# Patient Record
Sex: FEMALE | Race: WHITE | Employment: FULL TIME | URBAN - METROPOLITAN AREA
[De-identification: names, ages, dates, MRNs, and addresses within clinical notes are randomized per-mention and may not be internally consistent; named-entity substitution may affect disease eponyms.]

---

## 2017-01-17 ENCOUNTER — OFFICE VISIT (OUTPATIENT)
Dept: ENDOCRINOLOGY | Age: 67
End: 2017-01-17

## 2017-01-17 VITALS
SYSTOLIC BLOOD PRESSURE: 134 MMHG | DIASTOLIC BLOOD PRESSURE: 72 MMHG | WEIGHT: 212 LBS | HEIGHT: 64 IN | RESPIRATION RATE: 16 BRPM | BODY MASS INDEX: 36.19 KG/M2 | HEART RATE: 72 BPM | OXYGEN SATURATION: 94 % | TEMPERATURE: 98.5 F

## 2017-01-17 DIAGNOSIS — E83.52 HYPERCALCEMIA: Primary | ICD-10-CM

## 2017-01-17 DIAGNOSIS — E55.9 VITAMIN D DEFICIENCY: ICD-10-CM

## 2017-01-17 RX ORDER — GLUCOSAMINE SULFATE 1500 MG
2000 POWDER IN PACKET (EA) ORAL DAILY
COMMUNITY

## 2017-01-17 RX ORDER — GARLIC 1000 MG
2000 CAPSULE ORAL
COMMUNITY
End: 2017-02-23 | Stop reason: ALTCHOICE

## 2017-01-17 RX ORDER — BISMUTH SUBSALICYLATE 262 MG
1 TABLET,CHEWABLE ORAL DAILY
COMMUNITY
End: 2017-02-23 | Stop reason: ALTCHOICE

## 2017-01-17 RX ORDER — AMPICILLIN TRIHYDRATE 250 MG
600 CAPSULE ORAL
COMMUNITY
End: 2017-02-23 | Stop reason: ALTCHOICE

## 2017-01-17 RX ORDER — MULTIVITAMIN
1000 TABLET ORAL
COMMUNITY
End: 2017-02-23 | Stop reason: ALTCHOICE

## 2017-01-17 RX ORDER — ASCORBIC ACID 500 MG
TABLET ORAL
COMMUNITY
End: 2017-02-23 | Stop reason: ALTCHOICE

## 2017-01-17 RX ORDER — ASPIRIN 81 MG/1
TABLET ORAL DAILY
COMMUNITY
End: 2022-08-15

## 2017-01-17 RX ORDER — ACETAMINOPHEN 160 MG/5ML
SUSPENSION, ORAL (FINAL DOSE FORM) ORAL DAILY
COMMUNITY
End: 2017-02-23 | Stop reason: ALTCHOICE

## 2017-01-17 NOTE — PROGRESS NOTES
[facility] ENDOCRINOLOGY               Dereck Jackson MD        [address] 78 [phone] Fax [phone]             Patient Information  Date:1/17/2017  Name : Sharron Chapman 73 y.o.     YOB: 1950         Referred by: Hernando Walls MD       Chief Complaint   Patient presents with    Other     elevated calcium       History of present illness:    Sharron Chapman is a 73 y.o. female here for evaluation of hypercalcemia. She was referred here for evaluation of hypercalcemia which was noted in August 2016, when she was managed by [facility].  She was seen by Dr. Marsha Henry after that, calcium was 10.8 and now more than 11, PTH was low. She was on calcium, four tablets in the past, which she has reduced to only two tablets the last one year, no excess Tums. She is taking vitamin D. She has a history of leukemia, status post bone marrow transplantation in 1998, had premature menopause and was started on Fosamax. She is not sure why it was started. The last bone density in 2016 showed normal BMD.    No excess weight loss, bone pain, no history of sarcoidosis. She is on hydrochlorothiazide for blood pressure control and has been taking for several years. Over the last one year, the dose was increased.           No h/o excess calcium or vitamin D,vitamin A intake  No nausea,abdominal pain  No polyuria,polydipsia, nocturia  No lithium,theophylline usage  No h/o kidney stones,PUD  No h/o bone pain  No h/o osteoporosis,fragility fractures  DXA scan - 2016     BP Readings from Last 3 Encounters:   01/17/17 134/72   11/16/16 129/82   10/18/16 132/81       Wt Readings from Last 3 Encounters:   01/17/17 212 lb (96.2 kg)   11/16/16 209 lb (94.8 kg)   10/18/16 211 lb (95.7 kg)       Past Medical History   Diagnosis Date    Hypertension     Leukemia ([facility] [address]        Current Outpatient Prescriptions   Medication Sig    DOCOSAHEXANOIC ACID/EPA (FISH OIL PO) Take 1,200 mg by mouth daily. Twice daily    aspirin delayed-release 81 mg tablet Take  by mouth daily.  ascorbic acid, vitamin C, (VITAMIN C) 500 mg tablet Take  by mouth. W/ whitley hips    cholecalciferol (VITAMIN D3) 1,000 unit cap Take 2,000 Units by mouth daily.  garlic 5,291 mg cap Take 2,000 Units by mouth.  multivitamin (ONE A DAY) tablet Take 1 Tab by mouth daily. multimineral    FIBER CHOICE PO Take  by mouth. gummies    POMEGRANATE XT/POMEGRANAT SEED (POMEGRANATE PO) Take  by mouth. W/ apple cider vinegar    biotin 2,500 mcg tab Take 5,000 mcg by mouth.  cinnamon bark (CINNAMON) 500 mg cap Take 1,000 mg by mouth.  coenzyme Q-10 (CO Q-10) 200 mg capsule Take  by mouth daily.  red yeast rice extract 600 mg cap Take 600 mg by mouth now.  fenofibrate micronized (LOFIBRA) 134 mg capsule Take 1 Cap by mouth every morning.  atorvastatin (LIPITOR) 40 mg tablet Take 1 Tab by mouth daily.  bisoprolol-hydrochlorothiazide (ZIAC) 5-6.25 mg per tablet Take 1 Tab by mouth daily.  alendronate (FOSAMAX) 70 mg tablet Take  by mouth. No current facility-administered medications for this visit. Allergies   Allergen Reactions    Codeine Nausea and Vomiting    Erythromycin Nausea and Vomiting         Review of Systems:  - Constitutional Symptoms: no fevers, chills, weight loss  - Eyes: no blurry vision or double vision  - Cardiovascular: no chest pain or palpitations  - Respiratory: no cough or shortness of breath  - Gastrointestinal: no dysphagia   - Musculoskeletal: no joint pains or weakness  - Integumentary: no rashes  - Neurological: no numbness, tingling, or headaches  - Psychiatric: no depression or anxiety  - Endocrine: no heat or cold intolerance    Physical Examination:  Blood pressure 134/72, pulse 72, temperature 98.5 °F (36.9 °C), temperature source Oral, resp. rate 16, height 5' 4\" (1.626 m), weight 212 lb (96.2 kg), SpO2 94 %.   - General: pleasant, no distress, good eye contact  - HEENT: no pallor,EOMI  - Neck: supple, no thyromegaly,no lymphadenopathy. - Cardiovascular: regular, normal rate, normal S1 and S2, no murmurs  - Respiratory: clear to auscultation bilaterally  - Gastrointestinal: soft, nontender,   - Musculoskeletal: no proximal muscle weakness in upper or lower extremities  - Integumentary: no edema,  - Neurological: alert and oriented,no focal neurological deficits  - Psychiatric: normal mood and affect    Data Reviewed:     Lab Results   Component Value Date/Time    Calcium 11.1 11/16/2016 07:41 AM     No results found for: Saima David, XQVID3, XQVID, VD3RIA    No results found for: TSH, TSH2, TSH3, TSHP, TSHEXT, TSHEXT  Lab Results   Component Value Date/Time    Sodium 141 11/16/2016 07:41 AM    Potassium 4.7 11/16/2016 07:41 AM    Chloride 100 11/16/2016 07:41 AM    CO2 23 11/16/2016 07:41 AM    Glucose 97 11/16/2016 07:41 AM    BUN 21 11/16/2016 07:41 AM    Creatinine 0.93 11/16/2016 07:41 AM    BUN/Creatinine ratio 23 11/16/2016 07:41 AM    GFR est AA 75 11/16/2016 07:41 AM    GFR est non-AA 65 11/16/2016 07:41 AM    Calcium 11.1 11/16/2016 07:41 AM       [] Reviewed labs    Assessment/Plan:   Hypercalcemia  She has Non -PTH related hypercalcemia, denies any excess calcium intake. With hydrochlorothiazide induced hypercalcemia, the PTH should be within the normal range or slightly elevated. Need to repeat PTH. Sometimes, this could be HAMA interference and cause falsely low PTH. Biotin can falsely lower PTH in rare instances     She is on several supplements and I asked her to hold all supplements for three weeks, repeat labs and also check PTH- related peptide, serum protein electrophoresis, vitamin D-125. She has a history of leukemia but no systemic symptoms. There are no Patient Instructions on file for this visit.   Follow-up Disposition: Not on File      Thank you for allowing me to participate in the care of this patient.     Latrice Myers MD        Patient verbalized understanding

## 2017-01-17 NOTE — PROGRESS NOTES
Wt Readings from Last 3 Encounters:   01/17/17 212 lb (96.2 kg)   11/16/16 209 lb (94.8 kg)   10/18/16 211 lb (95.7 kg)     Temp Readings from Last 3 Encounters:   01/17/17 98.5 °F (36.9 °C) (Oral)   11/16/16 98.3 °F (36.8 °C) (Oral)   10/18/16 98.1 °F (36.7 °C) (Oral)     BP Readings from Last 3 Encounters:   01/17/17 134/72   11/16/16 129/82   10/18/16 132/81     Pulse Readings from Last 3 Encounters:   01/17/17 72   11/16/16 75   10/18/16 72

## 2017-01-17 NOTE — MR AVS SNAPSHOT
Visit Information Date & Time Provider Department Dept. Phone Encounter #  
 1/17/2017  2:00 PM Cezar Dalton MD [facility] [phone] [id number] Upcoming Health Maintenance Date Due Hepatitis C Screening 1950 DTaP/Tdap/Td series (1 - Tdap) 8/29/1972 FOBT Q 1 YEAR AGE 50-75 8/29/2001 ZOSTER VACCINE AGE 60> 8/29/2011 GLAUCOMA SCREENING Q2Y 8/29/2016 Pneumococcal 65+ High/Highest Risk (1 of 2 - PCV13) 8/29/2016 MEDICARE YEARLY EXAM 8/29/2016 BREAST CANCER SCRN MAMMOGRAM 11/29/2018 Allergies as of 1/17/2017  Review Complete On: 1/17/2017 By: Cezar Dalton MD  
  
 Severity Noted Reaction Type Reactions Codeine  10/18/2016    Nausea and Vomiting Erythromycin  10/18/2016    Nausea and Vomiting Current Immunizations  Reviewed on 10/18/2016 Name Date Influenza High Dose Vaccine PF 10/18/2016 Not reviewed this visit You Were Diagnosed With   
  
 Codes Comments Hypercalcemia    -  Primary ICD-10-CM: W16.62 
ICD-9-CM: 275.42 Vitals BP Pulse Temp Resp Height(growth percentile) Weight(growth percentile) 134/72 (BP 1 [facility]: Left arm, BP Patient Position: Sitting) 72 98.5 °F (36.9 °C) (Oral) 16 5' 4\" (1.626 m) 212 lb (96.2 kg) SpO2 BMI OB Status Smoking Status 94% 36.39 kg/m2 Postmenopausal Former Smoker Vitals History BMI and BSA Data Body Mass Index Body Surface Area  
 36.39 kg/m 2 2.08 m 2 Preferred Pharmacy Pharmacy Name Phone [facility] [address], [address] [phone] Your Updated Medication List  
  
   
This list is accurate as of: 1/17/17  2:55 PM.  Always use your most recent med list.  
  
  
  
  
 alendronate 70 mg tablet Commonly known as:  FOSAMAX Take  by mouth. aspirin delayed-release 81 mg tablet Take  by mouth daily. atorvastatin 40 mg tablet Commonly known as:  LIPITOR Take 1 Tab by mouth daily. biotin 2,500 mcg Tab Take 5,000 mcg by mouth. bisoprolol-hydroCHLOROthiazide 5-6.25 mg per tablet Commonly known as:  [facility] Take 1 Tab by mouth daily. CINNAMON 500 mg Cap Generic drug:  cinnamon bark Take 1,000 mg by mouth. CO Q-10 200 mg capsule Generic drug:  coenzyme Q-10 Take  by mouth daily. fenofibrate micronized 134 mg capsule Commonly known as:  LOFIBRA Take 1 Cap by mouth every morning. FIBER CHOICE PO Take  by mouth. gummies FISH OIL PO Take 1,200 mg by mouth daily. Twice daily  
  
 garlic 2,351 mg Cap Take 2,000 Units by mouth.  
  
 multivitamin tablet Commonly known as:  ONE A DAY Take 1 Tab by mouth daily. multimineral  
  
 POMEGRANATE PO Take  by mouth. W/ apple cider vinegar  
  
 red yeast rice extract 600 mg Cap Take 600 mg by mouth now. VITAMIN C 500 mg tablet Generic drug:  ascorbic acid (vitamin C) Take  by mouth. W/ whitley hips VITAMIN D3 1,000 unit Cap Generic drug:  cholecalciferol Take 2,000 Units by mouth daily. Introducing [facility] & HEALTH SERVICES! Dear Solomon Mathur: 
Thank you for requesting a GlySure account. Our records indicate that you already have an active GlySure account. You can access your account anytime at [email or web address] Did you know that you can access your hospital and ER discharge instructions at any time in GlySure? You can also review all of your test results from your hospital stay or ER visit. Additional Information If you have questions, please visit the Frequently Asked Questions section of the GlySure website at [email or web address] Remember, GlySure is NOT to be used for urgent needs. For medical emergencies, dial 911. Now available from your iPhone and Android! Please provide this summary of care documentation to your next provider. Your primary care clinician is listed as Latasha Paul. If you have any questions after today's visit, please call [phone].

## 2017-01-18 ENCOUNTER — TELEPHONE (OUTPATIENT)
Dept: ENDOCRINOLOGY | Age: 67
End: 2017-01-18

## 2017-01-18 DIAGNOSIS — R79.89 LOW SERUM PARATHYROID HORMONE (PTH): ICD-10-CM

## 2017-01-18 DIAGNOSIS — E83.52 HYPERCALCEMIA: Primary | ICD-10-CM

## 2017-01-18 NOTE — TELEPHONE ENCOUNTER
Pt returned call. Pt stated Dr. Cesario Godinez told her to stop taking her supplements for her next labs. Pt wanted to know if that included ASA. Informed pt that per Dr. Cesario Godinez she can continue taking ASA. Pt verbalized understanding with no further questions or concerns at this time.

## 2017-01-18 NOTE — TELEPHONE ENCOUNTER
Attempted to call. Unsuccessful. Left [facility] for Perri Lou to give us a call back at the office. A callback number was left.

## 2017-02-07 ENCOUNTER — TELEPHONE (OUTPATIENT)
Dept: ENDOCRINOLOGY | Age: 67
End: 2017-02-07

## 2017-02-07 DIAGNOSIS — E55.9 VITAMIN D DEFICIENCY: ICD-10-CM

## 2017-02-07 DIAGNOSIS — E83.52 HYPERCALCEMIA: Primary | ICD-10-CM

## 2017-02-12 LAB
1,25(OH)2D3 SERPL-MCNC: 50.9 PG/ML (ref 19.9–79.3)
25(OH)D3+25(OH)D2 SERPL-MCNC: 38.2 NG/ML (ref 30–100)
ALBUMIN SERPL ELPH-MCNC: 3.8 G/DL (ref 2.9–4.4)
ALBUMIN SERPL-MCNC: 4.3 G/DL (ref 3.6–4.8)
ALBUMIN/GLOB SERPL: 1.2 {RATIO} (ref 0.7–1.7)
ALBUMIN/GLOB SERPL: 1.6 {RATIO} (ref 1.1–2.5)
ALP SERPL-CCNC: 72 IU/L (ref 39–117)
ALPHA1 GLOB SERPL ELPH-MCNC: 0.3 G/DL (ref 0–0.4)
ALPHA2 GLOB SERPL ELPH-MCNC: 0.7 G/DL (ref 0.4–1)
ALT SERPL-CCNC: 27 IU/L (ref 0–32)
AST SERPL-CCNC: 25 IU/L (ref 0–40)
B-GLOBULIN SERPL ELPH-MCNC: 1.1 G/DL (ref 0.7–1.3)
BILIRUB SERPL-MCNC: 0.4 MG/DL (ref 0–1.2)
BUN SERPL-MCNC: 20 MG/DL (ref 8–27)
BUN/CREAT SERPL: 19 (ref 11–26)
CALCIUM SERPL-MCNC: 10.1 MG/DL (ref 8.7–10.3)
CHLORIDE SERPL-SCNC: 99 MMOL/L (ref 96–106)
CO2 SERPL-SCNC: 28 MMOL/L (ref 18–29)
CREAT SERPL-MCNC: 1.05 MG/DL (ref 0.57–1)
GAMMA GLOB SERPL ELPH-MCNC: 1.2 G/DL (ref 0.4–1.8)
GLOBULIN SER CALC-MCNC: 2.7 G/DL (ref 1.5–4.5)
GLOBULIN SER CALC-MCNC: 3.2 G/DL (ref 2.2–3.9)
GLUCOSE SERPL-MCNC: 95 MG/DL (ref 65–99)
INTERPRETATION: NORMAL
M PROTEIN SERPL ELPH-MCNC: NORMAL G/DL
MAGNESIUM SERPL-MCNC: 1.8 MG/DL (ref 1.6–2.3)
PLEASE NOTE, 011150: NORMAL
POTASSIUM SERPL-SCNC: 4.6 MMOL/L (ref 3.5–5.2)
PROT SERPL-MCNC: 7 G/DL (ref 6–8.5)
PTH RELATED PROT SERPL-SCNC: <1.1 PMOL/L
PTH-INTACT SERPL-MCNC: 25 PG/ML (ref 15–65)
SODIUM SERPL-SCNC: 141 MMOL/L (ref 134–144)
TSH SERPL DL<=0.005 MIU/L-ACNC: 2.1 UIU/ML (ref 0.45–4.5)

## 2017-02-20 ENCOUNTER — TELEPHONE (OUTPATIENT)
Dept: ENDOCRINOLOGY | Age: 67
End: 2017-02-20

## 2017-02-20 NOTE — TELEPHONE ENCOUNTER
Pt states she would like a call from Dr Misty Hinson regarding her calcium. She states she was told to add 1 calcium tab but pt is concerned because she has elevated calcium already. She wants to make sure she is taking the right medications.

## 2017-02-20 NOTE — TELEPHONE ENCOUNTER
Patient needs to verify what medications she should be taking. Patient has questions about medications also.

## 2017-02-20 NOTE — TELEPHONE ENCOUNTER
Based on the blood tests the elevated calcium she had  is not because of calcium tablets she is taking     Calcium is given to protect the bones and 1 a day is protective in postmenopausal women       In her case high calcium is due to either HCTZ ( BP med), other supplements she was taking -   Only when patients take 4 - 5 calcium tabs then they overdose and then calcium goes up

## 2017-02-22 ENCOUNTER — PATIENT OUTREACH (OUTPATIENT)
Dept: FAMILY MEDICINE CLINIC | Age: 67
End: 2017-02-22

## 2017-02-22 NOTE — TELEPHONE ENCOUNTER
Informed pt calcium tablet information per Dr Edith Joe. Pt asked that physician correct her medication list as she has been taken off of some of the meds.

## 2017-02-23 NOTE — PROGRESS NOTES
[address]  [phone]  Fax [phone]    Patient Name: Aby Hall  YOB: 1950    Ambulatory Nurse Navigator contacting patient to schedule [facility] 2017 Annual Health Assessment for patients enrolled with [facility] [address]. Patient states that she is not due yet for annual wellness this year. Deferring to April time frame when she will make an appt with Dr Humberto rueda. Patient would also like to discuss: Medications endocrinologist prescribed in their office. Reviewed what [facility] has on med recon. Patient states there were changes; encouraged contacting their office to verify medications Endocrine would like her on. Patient confirmed.

## 2017-03-03 ENCOUNTER — TELEPHONE (OUTPATIENT)
Dept: FAMILY MEDICINE CLINIC | Age: 67
End: 2017-03-03

## 2017-03-03 NOTE — TELEPHONE ENCOUNTER
Pt states she went to Urgent Care on 2/27/17 and was diagnosed with a ganglion cyst to the left wrist. She would like to know how to go about getting it drained.  Does she need a referral to another Dr. Almazan Cobre [facility] # [phone]

## 2017-05-23 ENCOUNTER — DOCUMENTATION ONLY (OUTPATIENT)
Dept: FAMILY MEDICINE CLINIC | Age: 67
End: 2017-05-23

## 2017-05-23 NOTE — PROGRESS NOTES
Records from [facility] signed by PCP and was sent to central scanning to be scanned into chart.

## 2017-05-31 ENCOUNTER — OFFICE VISIT (OUTPATIENT)
Dept: FAMILY MEDICINE CLINIC | Age: 67
End: 2017-05-31

## 2017-05-31 VITALS
DIASTOLIC BLOOD PRESSURE: 83 MMHG | RESPIRATION RATE: 18 BRPM | SYSTOLIC BLOOD PRESSURE: 131 MMHG | HEART RATE: 75 BPM | WEIGHT: 208 LBS | OXYGEN SATURATION: 98 % | HEIGHT: 64 IN | BODY MASS INDEX: 35.51 KG/M2 | TEMPERATURE: 98.5 F

## 2017-05-31 DIAGNOSIS — Z23 ENCOUNTER FOR IMMUNIZATION: ICD-10-CM

## 2017-05-31 DIAGNOSIS — Z01.419 WELL WOMAN EXAM WITH ROUTINE GYNECOLOGICAL EXAM: Primary | ICD-10-CM

## 2017-05-31 DIAGNOSIS — Z12.11 SCREEN FOR COLON CANCER: ICD-10-CM

## 2017-05-31 DIAGNOSIS — E83.52 HYPERCALCEMIA: ICD-10-CM

## 2017-05-31 DIAGNOSIS — E55.9 VITAMIN D DEFICIENCY: ICD-10-CM

## 2017-05-31 DIAGNOSIS — N30.90 BLADDER INFECTION: ICD-10-CM

## 2017-05-31 LAB
BILIRUB UR QL STRIP: NEGATIVE
GLUCOSE UR-MCNC: NEGATIVE MG/DL
KETONES P FAST UR STRIP-MCNC: NEGATIVE MG/DL
PH UR STRIP: 6.5 [PH] (ref 4.6–8)
PROT UR QL STRIP: NEGATIVE MG/DL
SP GR UR STRIP: 1.01 (ref 1–1.03)
UA UROBILINOGEN AMB POC: NORMAL (ref 0.2–1)
URINALYSIS CLARITY POC: CLEAR
URINALYSIS COLOR POC: YELLOW
URINE BLOOD POC: NEGATIVE
URINE LEUKOCYTES POC: NORMAL
URINE NITRITES POC: NEGATIVE

## 2017-05-31 NOTE — PROGRESS NOTES
Pt here for annual physical w/ fasting lab work. Reports that she recently completed abx due to bladder infection.

## 2017-05-31 NOTE — PROGRESS NOTES
Pt here for annual physical w/ fasting lab work. Reports that she recently completed abx due to bladder infection. 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 female for Well Woman Check. She is postmenopausal.  Pertinent past medical hstory:   Past Medical History:   Diagnosis Date    Hypertension     Leukemia [facility])        Patient Active Problem List   Diagnosis Code    Hypercalcemia E83.52    Vitamin D deficiency E55.9     Current Outpatient Prescriptions   Medication Sig Dispense Refill    DOCOSAHEXANOIC ACID/EPA (FISH OIL PO) Take 1,200 mg by mouth daily. Twice daily      aspirin delayed-release 81 mg tablet Take  by mouth daily.  cholecalciferol (VITAMIN D3) 1,000 unit cap Take 2,000 Units by mouth daily.  fenofibrate micronized (LOFIBRA) 134 mg capsule Take 1 Cap by mouth every morning. 90 Cap 3    atorvastatin (LIPITOR) 40 mg tablet Take 1 Tab by mouth daily. 90 Tab 3    bisoprolol-hydrochlorothiazide (ZIAC) 5-6.25 mg per tablet Take 1 Tab by mouth daily. 90 Tab 3     Allergies   Allergen Reactions    Codeine Nausea and Vomiting    Erythromycin Nausea and Vomiting        ROS:  Feeling well. No dyspnea or chest pain on exertion. No abdominal pain, change in bowel habits, black or bloody stools. No urinary tract symptoms. GYN ROS: no breast pain or new or enlarging lumps on self exam, no vaginal bleeding, no discharge or pelvic pain. Menopausal symptoms: none. No neurological complaints.     Last DEXA scan and T-score: 8/16   Last Colonoscopy: More than 10 years  Last Mammogram: 8/16    Objective:     Visit Vitals    /83 (BP 1 [facility]: Left arm, BP Patient Position: Sitting)    Pulse 75    Temp 98.5 °F (36.9 °C) (Oral)    Resp 18    Ht 5' 4\" (1.626 m)    Wt 208 lb (94.3 kg)    SpO2 98%    BMI 35.7 kg/m2     The patient appears well, alert, oriented x 3, in no distress. ENT normal.  Neck supple. No adenopathy or thyromegaly. FRANCESCA. Lungs are clear, good air entry, no wheezes, rhonchi or rales. S1 and S2 normal, no murmurs, regular rate and rhythm. Abdomen soft without tenderness, guarding, mass or organomegaly. Extremities show no edema, normal peripheral pulses. Neurological is normal, no focal findings. BREAST EXAM: breasts appear normal, no suspicious masses, no skin or nipple changes or axillary nodes    PELVIC EXAM: VULVA: normal appearing vulva with no masses, tenderness or lesions, VAGINA: normal appearing vagina with normal color and discharge, no lesions, CERVIX: normal appearing cervix without discharge or lesions, UTERUS: uterus is normal size, shape, consistency and nontender, ADNEXA: normal adnexa in size, nontender and no masses    Assessment/Plan:   well woman  postmenopausal  pap smear  additional lab tests per orders  return annually or prn    ICD-10-CM ICD-9-CM    1. Well woman exam with routine gynecological exam Z01.419 V72.31 PAP + HPV DNA (HIGH RISK)      CBC WITH AUTOMATED DIFF      LIPID PANEL      TSH 3RD GENERATION      HEMOGLOBIN A1C WITH EAG      HEPATITIS C AB    [V72.31]   2. Bladder infection N30.90 595.9 AMB POC URINALYSIS DIP STICK AUTO W/O MICRO   3. Hypercalcemia P61.19 835.44 METABOLIC PANEL, COMPREHENSIVE   4. Vitamin D deficiency E55.9 268.9 VITAMIN D, 25 HYDROXY   5. Encounter for immunization Z23 V03.89 ADMIN PNEUMOCOCCAL VACCINE      PNEUMOCOCCAL POLYSACCHARIDE VACCINE, 23-VALENT, ADULT OR IMMUNOSUPPRESSED PT DOSE,   6. Screen for colon cancer Z12.11 V76.51 OCCULT BLOOD, IMMUNOASSAY (FIT)     Encounter Diagnoses   Name Primary?     Well woman exam with routine gynecological exam Yes    Comment: [V72.31]    Bladder infection     Hypercalcemia     Vitamin D deficiency     Encounter for immunization     Screen for colon cancer      Orders Placed This Encounter    Pneumococcal Polysaccharide Vaccine    CBC WITH AUTOMATED DIFF    LIPID PANEL    METABOLIC PANEL, COMPREHENSIVE    TSH 3RD GENERATION    VITAMIN D, 25 HYDROXY    HEMOGLOBIN A1C WITH EAG    HEPATITIS C AB    OCCULT BLOOD, IMMUNOASSAY (FIT)    AMB POC URINALYSIS DIP STICK AUTO W/O MICRO    PAP, HPV HIGH RISK   .

## 2017-05-31 NOTE — MR AVS SNAPSHOT
Visit Information Date & Time Provider Department Dept. Phone Encounter #  
 5/31/2017  8:30 AM Guera Weiss MD [address] [facility] [phone] [id number] Upcoming Health Maintenance Date Due Hepatitis C Screening 1950 DTaP/Tdap/Td series (1 - Tdap) 8/29/1972 FOBT Q 1 YEAR AGE 50-75 8/29/2001 Pneumococcal 65+ High/Highest Risk (1 of 2 - PCV13) 8/29/2016 INFLUENZA AGE 9 TO ADULT 8/1/2017 GLAUCOMA SCREENING Q2Y 2/1/2018 MEDICARE YEARLY EXAM 6/1/2018 BREAST CANCER SCRN MAMMOGRAM 11/29/2018 Allergies as of 5/31/2017  Review Complete On: 5/31/2017 By: Guera Weiss MD  
  
 Severity Noted Reaction Type Reactions Codeine  10/18/2016    Nausea and Vomiting Erythromycin  10/18/2016    Nausea and Vomiting Current Immunizations  Reviewed on 10/18/2016 Name Date Influenza High Dose Vaccine PF 10/18/2016 Pneumococcal Polysaccharide (PPSV-23)  Incomplete Zoster Vaccine, Live 1/1/2015 Not reviewed this visit You Were Diagnosed With   
  
 Codes Comments Well woman exam with routine gynecological exam    -  Primary ICD-10-CM: P00.686 ICD-9-CM: V72.31 [V72.31] Bladder infection     ICD-10-CM: N30.90 ICD-9-CM: 595.9 Hypercalcemia     ICD-10-CM: R28.96 
ICD-9-CM: 275.42 Vitamin D deficiency     ICD-10-CM: E55.9 ICD-9-CM: 268.9 Encounter for immunization     ICD-10-CM: O91 ICD-9-CM: V03.89 Screen for colon cancer     ICD-10-CM: Z12.11 ICD-9-CM: V76.51 Vitals BP Pulse Temp Resp Height(growth percentile) Weight(growth percentile) 131/83 (BP 1 [facility]: Left arm, BP Patient Position: Sitting) 75 98.5 °F (36.9 °C) (Oral) 18 5' 4\" (1.626 m) 208 lb (94.3 kg) SpO2 BMI OB Status Smoking Status 98% 35.7 kg/m2 Postmenopausal Former Smoker Vitals History BMI and BSA Data Body Mass Index Body Surface Area 35.7 kg/m 2 2.06 m 2 Preferred Pharmacy Pharmacy Name Phone [facility] Les[facility] FRANCIS [phone] Your Updated Medication List  
  
   
This list is accurate as of: 5/31/17  9:16 AM.  Always use your most recent med list.  
  
  
  
  
 aspirin delayed-release 81 mg tablet Take  by mouth daily. atorvastatin 40 mg tablet Commonly known as:  LIPITOR Take 1 Tab by mouth daily. bisoprolol-hydroCHLOROthiazide 5-6.25 mg per tablet Commonly known as:  [facility] Take 1 Tab by mouth daily. fenofibrate micronized 134 mg capsule Commonly known as:  LOFIBRA Take 1 Cap by mouth every morning. FISH OIL PO Take 1,200 mg by mouth daily. Twice daily VITAMIN D3 1,000 unit Cap Generic drug:  cholecalciferol Take 2,000 Units by mouth daily. We Performed the Following ADMIN PNEUMOCOCCAL VACCINE [ HCPCS] AMB POC URINALYSIS DIP STICK AUTO W/O MICRO [38636 CPT(R)] CBC WITH AUTOMATED DIFF [68567 CPT(R)] HEMOGLOBIN A1C WITH EAG [73635 CPT(R)] HEPATITIS C AB [13574 CPT(R)] LIPID PANEL [64331 CPT(R)] METABOLIC PANEL, COMPREHENSIVE [52812 CPT(R)] OCCULT BLOOD, IMMUNOASSAY (FIT) [id number] CPT(R)] PNEUMOCOCCAL POLYSACCHARIDE VACCINE, 23-VALENT, ADULT OR IMMUNOSUPPRESSED PT DOSE, [52926 CPT(R)] TSH 3RD GENERATION [01090 CPT(R)] VITAMIN D, 25 HYDROXY [id number] CPT(R)] To-Do List   
 05/31/2017 Pathology:  PAP + HPV DNA (HIGH RISK) Introducing [facility] & HEALTH SERVICES! Dear [address] [place] [address]: 
Thank you for requesting a Iotelligent account. Our records indicate that you already have an active Iotelligent account. You can access your account anytime at [email or web address] Did you know that you can access your hospital and ER discharge instructions at any time in Iotelligent? You can also review all of your test results from your hospital stay or ER visit. Additional Information If you have questions, please visit the Frequently Asked Questions section of the Boxeverhart website at [email or web address] Remember, Funji is NOT to be used for urgent needs. For medical emergencies, dial 911. Now available from your iPhone and Android! Please provide this summary of care documentation to your next provider. Your primary care clinician is listed as Latasha Paul. If you have any questions after today's visit, please call [phone].

## 2017-06-01 LAB
25(OH)D3+25(OH)D2 SERPL-MCNC: 38.1 NG/ML (ref 30–100)
ALBUMIN SERPL-MCNC: 4.7 G/DL (ref 3.6–4.8)
ALBUMIN/GLOB SERPL: 1.6 {RATIO} (ref 1.2–2.2)
ALP SERPL-CCNC: 81 IU/L (ref 39–117)
ALT SERPL-CCNC: 25 IU/L (ref 0–32)
AST SERPL-CCNC: 27 IU/L (ref 0–40)
BACTERIA UR CULT: NORMAL
BASOPHILS # BLD AUTO: 0 X10E3/UL (ref 0–0.2)
BASOPHILS NFR BLD AUTO: 0 %
BILIRUB SERPL-MCNC: 0.4 MG/DL (ref 0–1.2)
BUN SERPL-MCNC: 17 MG/DL (ref 8–27)
BUN/CREAT SERPL: 16 (ref 12–28)
CALCIUM SERPL-MCNC: 10.5 MG/DL (ref 8.7–10.3)
CHLORIDE SERPL-SCNC: 101 MMOL/L (ref 96–106)
CHOLEST SERPL-MCNC: 168 MG/DL (ref 100–199)
CO2 SERPL-SCNC: 23 MMOL/L (ref 18–29)
CREAT SERPL-MCNC: 1.06 MG/DL (ref 0.57–1)
EOSINOPHIL # BLD AUTO: 0.1 X10E3/UL (ref 0–0.4)
EOSINOPHIL NFR BLD AUTO: 1 %
ERYTHROCYTE [DISTWIDTH] IN BLOOD BY AUTOMATED COUNT: 15.8 % (ref 12.3–15.4)
EST. AVERAGE GLUCOSE BLD GHB EST-MCNC: 148 MG/DL
GLOBULIN SER CALC-MCNC: 2.9 G/DL (ref 1.5–4.5)
GLUCOSE SERPL-MCNC: 110 MG/DL (ref 65–99)
HBA1C MFR BLD: 6.8 % (ref 4.8–5.6)
HCT VFR BLD AUTO: 45.1 % (ref 34–46.6)
HCV AB S/CO SERPL IA: <0.1 S/CO RATIO (ref 0–0.9)
HDLC SERPL-MCNC: 56 MG/DL
HGB BLD-MCNC: 14.5 G/DL (ref 11.1–15.9)
IMM GRANULOCYTES # BLD: 0 X10E3/UL (ref 0–0.1)
IMM GRANULOCYTES NFR BLD: 0 %
INTERPRETATION, 910389: NORMAL
INTERPRETATION: NORMAL
LDLC SERPL CALC-MCNC: 82 MG/DL (ref 0–99)
LYMPHOCYTES # BLD AUTO: 3.7 X10E3/UL (ref 0.7–3.1)
LYMPHOCYTES NFR BLD AUTO: 41 %
MCH RBC QN AUTO: 28.2 PG (ref 26.6–33)
MCHC RBC AUTO-ENTMCNC: 32.2 G/DL (ref 31.5–35.7)
MCV RBC AUTO: 88 FL (ref 79–97)
MONOCYTES # BLD AUTO: 0.6 X10E3/UL (ref 0.1–0.9)
MONOCYTES NFR BLD AUTO: 7 %
NEUTROPHILS # BLD AUTO: 4.6 X10E3/UL (ref 1.4–7)
NEUTROPHILS NFR BLD AUTO: 51 %
PDF IMAGE, 910387: NORMAL
PLATELET # BLD AUTO: 337 X10E3/UL (ref 150–379)
POTASSIUM SERPL-SCNC: 4.8 MMOL/L (ref 3.5–5.2)
PROT SERPL-MCNC: 7.6 G/DL (ref 6–8.5)
RBC # BLD AUTO: 5.14 X10E6/UL (ref 3.77–5.28)
SODIUM SERPL-SCNC: 142 MMOL/L (ref 134–144)
TRIGL SERPL-MCNC: 149 MG/DL (ref 0–149)
TSH SERPL DL<=0.005 MIU/L-ACNC: 2.22 UIU/ML (ref 0.45–4.5)
VLDLC SERPL CALC-MCNC: 30 MG/DL (ref 5–40)
WBC # BLD AUTO: 9 X10E3/UL (ref 3.4–10.8)

## 2017-06-01 NOTE — PROGRESS NOTES
Blood sugars levels are increased and have reached diabetic levels. I would recommend a starting on a daily medication for blood sugar control. Please advise if you agree. All other labs are within normal limits. A message has been sent in Curetis and the lab work released to the patient.

## 2017-06-07 RX ORDER — INSULIN PUMP SYRINGE, 3 ML
EACH MISCELLANEOUS
Qty: 1 KIT | Refills: 0 | Status: SHIPPED | OUTPATIENT
Start: 2017-06-07 | End: 2017-06-14 | Stop reason: SDUPTHER

## 2017-06-07 RX ORDER — LANCETS
EACH MISCELLANEOUS
Qty: 100 EACH | Refills: 11 | Status: SHIPPED | OUTPATIENT
Start: 2017-06-07 | End: 2017-06-14 | Stop reason: SDUPTHER

## 2017-06-08 ENCOUNTER — PATIENT OUTREACH (OUTPATIENT)
Dept: FAMILY MEDICINE CLINIC | Age: 67
End: 2017-06-08

## 2017-06-08 LAB
CYTOLOGIST CVX/VAG CYTO: NORMAL
DX ICD CODE: NORMAL
HPV I/H RISK 1 DNA CVX QL PROBE+SIG AMP: NEGATIVE
Lab: NORMAL
OTHER STN SPEC: NORMAL
PATH REPORT.FINAL DX SPEC: NORMAL
STAT OF ADQ CVX/VAG CYTO-IMP: NORMAL

## 2017-06-08 NOTE — PROGRESS NOTES
[address]  [phone]  Fax [phone]    Patient Name: Sherin Schilder  YOB: 1950    [facility] 2017 [address] for patients enrolled with [facility] [address]. Patient has come in for Wellness exam last week. F/u for completion.

## 2017-06-09 LAB — HEMOCCULT STL QL IA: NEGATIVE

## 2017-06-14 RX ORDER — LANCETS
EACH MISCELLANEOUS
Qty: 100 EACH | Refills: 11 | Status: SHIPPED | OUTPATIENT
Start: 2017-06-14 | End: 2020-11-06

## 2017-06-14 RX ORDER — INSULIN PUMP SYRINGE, 3 ML
EACH MISCELLANEOUS
Qty: 1 KIT | Refills: 0 | Status: SHIPPED | OUTPATIENT
Start: 2017-06-14 | End: 2022-09-01

## 2017-07-25 ENCOUNTER — OFFICE VISIT (OUTPATIENT)
Dept: FAMILY MEDICINE CLINIC | Age: 67
End: 2017-07-25

## 2017-07-25 VITALS
SYSTOLIC BLOOD PRESSURE: 110 MMHG | HEIGHT: 64 IN | WEIGHT: 199.8 LBS | RESPIRATION RATE: 18 BRPM | OXYGEN SATURATION: 96 % | TEMPERATURE: 99 F | HEART RATE: 74 BPM | DIASTOLIC BLOOD PRESSURE: 74 MMHG | BODY MASS INDEX: 34.11 KG/M2

## 2017-07-25 DIAGNOSIS — N30.00 ACUTE CYSTITIS WITHOUT HEMATURIA: ICD-10-CM

## 2017-07-25 DIAGNOSIS — N30.90 BLADDER INFECTION: Primary | ICD-10-CM

## 2017-07-25 LAB
BILIRUB UR QL STRIP: NEGATIVE
GLUCOSE UR-MCNC: NEGATIVE MG/DL
KETONES P FAST UR STRIP-MCNC: NEGATIVE MG/DL
PH UR STRIP: 7 [PH] (ref 4.6–8)
PROT UR QL STRIP: NEGATIVE MG/DL
SP GR UR STRIP: 1.01 (ref 1–1.03)
UA UROBILINOGEN AMB POC: NORMAL (ref 0.2–1)
URINALYSIS CLARITY POC: CLEAR
URINALYSIS COLOR POC: NORMAL
URINE BLOOD POC: NORMAL
URINE LEUKOCYTES POC: NORMAL
URINE NITRITES POC: POSITIVE

## 2017-07-25 RX ORDER — CIPROFLOXACIN 500 MG/1
500 TABLET ORAL 2 TIMES DAILY
Qty: 14 TAB | Refills: 0 | Status: SHIPPED | OUTPATIENT
Start: 2017-07-25 | End: 2017-07-28 | Stop reason: ALTCHOICE

## 2017-07-25 NOTE — PROGRESS NOTES
Chief Complaint   Patient presents with    Bladder Infection       Patient seen in the office today for c/o of a possible bladder infection. She denies pelvic pain, nausea, vomiting or fevers Patient states she has been taking Azo and her urine has been orange but no odor.

## 2017-07-25 NOTE — MR AVS SNAPSHOT
Visit Information Date & Time Provider Department Dept. Phone Encounter #  
 7/25/2017  4:00 PM Isatu Paul MD [address] [facility] [phone] [id number] Upcoming Health Maintenance Date Due DTaP/Tdap/Td series (1 - Tdap) 8/29/1972 MEDICARE YEARLY EXAM 8/29/2016 INFLUENZA AGE 9 TO ADULT 8/1/2017 GLAUCOMA SCREENING Q2Y 2/1/2018 Pneumococcal 65+ High/Highest Risk (2 of 2 - PCV13) 5/31/2018 FOBT Q 1 YEAR AGE 50-75 5/31/2018 BREAST CANCER SCRN MAMMOGRAM 11/29/2018 Allergies as of 7/25/2017  Review Complete On: 7/25/2017 By: Clara Mcnally MD  
  
 Severity Noted Reaction Type Reactions Codeine  10/18/2016    Nausea and Vomiting Erythromycin  10/18/2016    Nausea and Vomiting Current Immunizations  Reviewed on 5/31/2017 Name Date Influenza High Dose Vaccine PF 10/18/2016 Pneumococcal Polysaccharide (PPSV-23) 5/31/2017 Zoster Vaccine, Live 1/1/2015 Not reviewed this visit You Were Diagnosed With   
  
 Codes Comments Bladder infection    -  Primary ICD-10-CM: N30.90 ICD-9-CM: 595.9 Acute cystitis without hematuria     ICD-10-CM: N30.00 ICD-9-CM: 595.0 Vitals BP Pulse Temp Resp Height(growth percentile) Weight(growth percentile) 110/74 (BP 1 [facility]: Left arm, BP Patient Position: Sitting) 74 99 °F (37.2 °C) (Oral) 18 5' 4\" (1.626 m) 199 lb 12.8 oz (90.6 kg) SpO2 BMI OB Status Smoking Status 96% 34.3 kg/m2 Postmenopausal Former Smoker Vitals History BMI and BSA Data Body Mass Index Body Surface Area  
 34.3 kg/m 2 2.02 m 2 [facility] [address] [place] [phone] Your Updated Medication List  
  
   
This list is accurate as of: 7/25/17  4:29 PM.  Always use your most recent med list.  
  
  
  
  
 aspirin delayed-release 81 mg tablet Take  by mouth daily. atorvastatin 40 mg tablet Commonly known as:  LIPITOR Take 1 Tab by mouth daily. bisoprolol-hydroCHLOROthiazide 5-6.25 mg per tablet Commonly known as:  [facility] Take 1 Tab by mouth daily. Blood-Glucose Meter monitoring kit Please check blood sugar once daily. E11.9 Please provide with a freestyle product. ciprofloxacin HCl 500 mg tablet Commonly known as:  CIPRO Take 1 Tab by mouth two (2) times a day for 7 days. fenofibrate micronized 134 mg capsule Commonly known as:  LOFIBRA Take 1 Cap by mouth every morning. FISH OIL PO Take 1,200 mg by mouth daily. Twice daily  
  
 glucose blood VI test strips strip Commonly known as:  ASCENSIA AUTODISC VI, ONE TOUCH ULTRA TEST VI Please check blood sugar once daily. E11.9 Please provide with a freestyle product. Lancets Misc Please check blood sugar once daily. E11.9 Please provide with a freestyle product. VITAMIN D3 1,000 unit Cap Generic drug:  cholecalciferol Take 2,000 Units by mouth daily. Prescriptions Sent to Pharmacy Refills  
 ciprofloxacin HCl (CIPRO) 500 mg tablet 0 Sig: Take 1 Tab by mouth two (2) times a day for 7 days. Class: Normal  
 Pharmacy: [address] [place] [address] 58, [address] [place] Ph #: [phone] Route: Oral  
  
We Performed the Following AMB POC URINALYSIS DIP STICK AUTO W/O MICRO [01574 CPT(R)] CULTURE, URINE [id number] CPT(R)] Introducing [facility] & HEALTH SERVICES! Dear Obinna Sayres: 
Thank you for requesting a BollingoBlog account. Our records indicate that you already have an active BollingoBlog account. You can access your account anytime at [email or web address] Did you know that you can access your hospital and ER discharge instructions at any time in BollingoBlog? You can also review all of your test results from your hospital stay or ER visit. Additional Information If you have questions, please visit the Frequently Asked Questions section of the MyCarehart website at [email or web address] Remember, picsell is NOT to be used for urgent needs. For medical emergencies, dial 911. Now available from your iPhone and Android! Please provide this summary of care documentation to your next provider. Your primary care clinician is listed as Latasha Paul. If you have any questions after today's visit, please call [phone].

## 2017-07-25 NOTE — PROGRESS NOTES
Chief Complaint   Patient presents with    Bladder Infection   Patient seen in the office today for c/o of a possible bladder infection. She denies pelvic pain, nausea, vomiting or fevers Patient states she has been taking Azo and her urine has been orange but no odor. Pt brought her home blood sugar readings, fasting is under 110, under 130 2 hours after eating. Subjective:     Perri Alonso is a 73 y.o. female who complains of dysuria, frequency for 3 days. Patient denies flank pain, vomiting, fever, unusual vaginal discharge. Patient does not have a history of recurrent UTI. Patient does not have a history of pyelonephritis. Patient Active Problem List   Diagnosis Code    Hypercalcemia E83.52    Vitamin D deficiency E55.9     Allergies   Allergen Reactions    Codeine Nausea and Vomiting    Erythromycin Nausea and Vomiting        Review of Systems  Pertinent items are noted in HPI. Objective:     Visit Vitals    /74 (BP 1 [facility]: Left arm, BP Patient Position: Sitting)    Pulse 74    Temp 99 °F (37.2 °C) (Oral)    Resp 18    Ht 5' 4\" (1.626 m)    Wt 199 lb 12.8 oz (90.6 kg)    SpO2 96%    BMI 34.3 kg/m2     General:  alert, cooperative, no distress   Abdomen: soft, nontender, nondistended, no masses or organomegaly. Back:  CVA tenderness absent   :  defer exam     Laboratory:   Urine dipstick shows positive for nitrates. Micro exam: not done. Assessment/Plan:     Acute cystitis     1. ciprofloxacin  2. Maintain adequate hydration  3. May use OTC pyridium as desired, which will turn urine orange/red color  4. Follow up if symptoms not improving, and prn. ICD-10-CM ICD-9-CM    1. Bladder infection N30.90 595.9 AMB POC URINALYSIS DIP STICK AUTO W/O MICRO   2. Acute cystitis without hematuria N30.00 595.0 ciprofloxacin HCl (CIPRO) 500 mg tablet      CULTURE, URINE     Encounter Diagnoses   Name Primary?     Bladder infection Yes    Acute cystitis without hematuria      Orders Placed This Encounter    CULTURE, URINE    AMB POC URINALYSIS DIP STICK AUTO W/O MICRO    ciprofloxacin HCl (CIPRO) 500 mg tablet   .

## 2017-07-27 LAB
BACTERIA UR CULT: ABNORMAL
BACTERIA UR CULT: ABNORMAL

## 2017-07-28 ENCOUNTER — TELEPHONE (OUTPATIENT)
Dept: FAMILY MEDICINE CLINIC | Age: 67
End: 2017-07-28

## 2017-07-28 RX ORDER — SULFAMETHOXAZOLE AND TRIMETHOPRIM 800; 160 MG/1; MG/1
1 TABLET ORAL 2 TIMES DAILY
Qty: 10 TAB | Refills: 0 | Status: SHIPPED | OUTPATIENT
Start: 2017-07-28 | End: 2017-08-02

## 2017-07-28 NOTE — TELEPHONE ENCOUNTER
Pt called very upset that she has not heard back from Tirso Gann for urine culture results. I informed pt that Dr. Sven Monge was out of the office today, she requested that another provider check into why her urine culture has not been resulted. She can be reached at [phone]. Also she has some concern as to what antibiotic was called in for her. She has sent emails to [facility] that she does not want to take Cipro and is expecting a different one.

## 2017-07-28 NOTE — TELEPHONE ENCOUNTER
Culture shows mild infection. Okay to hold cipro. Alternative abx bactrim has been sent to pharmacy on file for pt to take BID for 5 days. Push fluids. Follow up in office to retest urine if sx persist or worsen. Please advise.

## 2017-08-23 ENCOUNTER — OFFICE VISIT (OUTPATIENT)
Dept: FAMILY MEDICINE CLINIC | Age: 67
End: 2017-08-23

## 2017-08-23 VITALS
BODY MASS INDEX: 33.9 KG/M2 | HEIGHT: 64 IN | OXYGEN SATURATION: 94 % | TEMPERATURE: 98.6 F | DIASTOLIC BLOOD PRESSURE: 82 MMHG | HEART RATE: 74 BPM | RESPIRATION RATE: 17 BRPM | WEIGHT: 198.6 LBS | SYSTOLIC BLOOD PRESSURE: 124 MMHG

## 2017-08-23 DIAGNOSIS — E83.52 HYPERCALCEMIA: ICD-10-CM

## 2017-08-23 DIAGNOSIS — Z00.00 MEDICARE ANNUAL WELLNESS VISIT, SUBSEQUENT: Primary | ICD-10-CM

## 2017-08-23 DIAGNOSIS — E55.9 VITAMIN D DEFICIENCY: ICD-10-CM

## 2017-08-23 DIAGNOSIS — N30.90 BLADDER INFECTION: ICD-10-CM

## 2017-08-23 DIAGNOSIS — R73.9 ELEVATED BLOOD SUGAR: ICD-10-CM

## 2017-08-23 LAB
BILIRUB UR QL STRIP: NEGATIVE
GLUCOSE UR-MCNC: NORMAL MG/DL
KETONES P FAST UR STRIP-MCNC: NEGATIVE MG/DL
PH UR STRIP: 5.5 [PH] (ref 4.6–8)
PROT UR QL STRIP: NORMAL MG/DL
SP GR UR STRIP: 1.01 (ref 1–1.03)
UA UROBILINOGEN AMB POC: NORMAL (ref 0.2–1)
URINALYSIS CLARITY POC: CLEAR
URINALYSIS COLOR POC: NORMAL
URINE BLOOD POC: NORMAL
URINE LEUKOCYTES POC: NORMAL
URINE NITRITES POC: POSITIVE

## 2017-08-23 RX ORDER — NITROFURANTOIN 25; 75 MG/1; MG/1
100 CAPSULE ORAL 2 TIMES DAILY
Qty: 14 CAP | Refills: 0 | Status: SHIPPED | OUTPATIENT
Start: 2017-08-23 | End: 2017-08-30

## 2017-08-23 RX ORDER — MULTIVITAMIN
TABLET ORAL
Qty: 90 CAP | Refills: 3 | COMMUNITY
Start: 2017-08-23 | End: 2022-09-01

## 2017-08-23 NOTE — PROGRESS NOTES
Chief Complaint   Patient presents with    Bladder Infection    Labs     Patient is fasting      Patient seen in the office today for c/o of a bladder infection. She states she is having urinary frequency and bladder pain.

## 2017-08-23 NOTE — PATIENT INSTRUCTIONS

## 2017-08-24 LAB
25(OH)D3+25(OH)D2 SERPL-MCNC: 33.3 NG/ML (ref 30–100)
ALBUMIN SERPL-MCNC: 4.6 G/DL (ref 3.6–4.8)
ALBUMIN/GLOB SERPL: 1.6 {RATIO} (ref 1.2–2.2)
ALP SERPL-CCNC: 68 IU/L (ref 39–117)
ALT SERPL-CCNC: 26 IU/L (ref 0–32)
AST SERPL-CCNC: 29 IU/L (ref 0–40)
BASOPHILS # BLD AUTO: 0 X10E3/UL (ref 0–0.2)
BASOPHILS NFR BLD AUTO: 0 %
BILIRUB SERPL-MCNC: 0.6 MG/DL (ref 0–1.2)
BUN SERPL-MCNC: 20 MG/DL (ref 8–27)
BUN/CREAT SERPL: 21 (ref 12–28)
CALCIUM SERPL-MCNC: 10.4 MG/DL (ref 8.7–10.3)
CHLORIDE SERPL-SCNC: 100 MMOL/L (ref 96–106)
CHOLEST SERPL-MCNC: 140 MG/DL (ref 100–199)
CO2 SERPL-SCNC: 23 MMOL/L (ref 18–29)
CREAT SERPL-MCNC: 0.96 MG/DL (ref 0.57–1)
EOSINOPHIL # BLD AUTO: 0.1 X10E3/UL (ref 0–0.4)
EOSINOPHIL NFR BLD AUTO: 1 %
ERYTHROCYTE [DISTWIDTH] IN BLOOD BY AUTOMATED COUNT: 15.2 % (ref 12.3–15.4)
EST. AVERAGE GLUCOSE BLD GHB EST-MCNC: 126 MG/DL
GLOBULIN SER CALC-MCNC: 2.8 G/DL (ref 1.5–4.5)
GLUCOSE SERPL-MCNC: 109 MG/DL (ref 65–99)
HBA1C MFR BLD: 6 % (ref 4.8–5.6)
HCT VFR BLD AUTO: 41.2 % (ref 34–46.6)
HDLC SERPL-MCNC: 47 MG/DL
HGB BLD-MCNC: 13.4 G/DL (ref 11.1–15.9)
IMM GRANULOCYTES # BLD: 0 X10E3/UL (ref 0–0.1)
IMM GRANULOCYTES NFR BLD: 0 %
INTERPRETATION, 910389: NORMAL
LDLC SERPL CALC-MCNC: 67 MG/DL (ref 0–99)
LYMPHOCYTES # BLD AUTO: 3.3 X10E3/UL (ref 0.7–3.1)
LYMPHOCYTES NFR BLD AUTO: 37 %
MCH RBC QN AUTO: 28.4 PG (ref 26.6–33)
MCHC RBC AUTO-ENTMCNC: 32.5 G/DL (ref 31.5–35.7)
MCV RBC AUTO: 87 FL (ref 79–97)
MONOCYTES # BLD AUTO: 0.6 X10E3/UL (ref 0.1–0.9)
MONOCYTES NFR BLD AUTO: 6 %
NEUTROPHILS # BLD AUTO: 5 X10E3/UL (ref 1.4–7)
NEUTROPHILS NFR BLD AUTO: 56 %
PLATELET # BLD AUTO: 278 X10E3/UL (ref 150–379)
POTASSIUM SERPL-SCNC: 4.4 MMOL/L (ref 3.5–5.2)
PROT SERPL-MCNC: 7.4 G/DL (ref 6–8.5)
RBC # BLD AUTO: 4.72 X10E6/UL (ref 3.77–5.28)
SODIUM SERPL-SCNC: 140 MMOL/L (ref 134–144)
TRIGL SERPL-MCNC: 131 MG/DL (ref 0–149)
TSH SERPL DL<=0.005 MIU/L-ACNC: 2.57 UIU/ML (ref 0.45–4.5)
VLDLC SERPL CALC-MCNC: 26 MG/DL (ref 5–40)
WBC # BLD AUTO: 9.1 X10E3/UL (ref 3.4–10.8)

## 2017-08-25 LAB
BACTERIA UR CULT: ABNORMAL
BACTERIA UR CULT: ABNORMAL

## 2017-08-25 NOTE — PROGRESS NOTES
Urine culture shows E. Coli, you are on an appropriate antibiotic. Diabetic control greatly improved! Keep up the good work! All other labs are within normal limits. A message has been sent in Artwardly and the lab work released to the patient.

## 2017-08-31 ENCOUNTER — TELEPHONE (OUTPATIENT)
Dept: FAMILY MEDICINE CLINIC | Age: 67
End: 2017-08-31

## 2017-08-31 RX ORDER — SULFAMETHOXAZOLE AND TRIMETHOPRIM 800; 160 MG/1; MG/1
1 TABLET ORAL 2 TIMES DAILY
Qty: 10 TAB | Refills: 0 | Status: SHIPPED | OUTPATIENT
Start: 2017-08-31 | End: 2017-09-05

## 2017-08-31 RX ORDER — CIPROFLOXACIN 500 MG/1
500 TABLET ORAL 2 TIMES DAILY
Qty: 10 TAB | Refills: 0 | Status: SHIPPED | OUTPATIENT
Start: 2017-08-31 | End: 2017-08-31

## 2017-08-31 NOTE — TELEPHONE ENCOUNTER
Pt states that she has finished all of Rx Macrobid for her bladder infection, she states that she did improve but today she has had pain with urination. She is requesting another antibiotic to be sent to the pharmacy. Please advise pt [phone].

## 2017-08-31 NOTE — TELEPHONE ENCOUNTER
Notified pt per Sara Baird. Pt requesting to have RX changed to Bactrim because she \"does not like taking Cipro\".

## 2017-11-06 RX ORDER — BISOPROLOL FUMARATE AND HYDROCHLOROTHIAZIDE 5; 6.25 MG/1; MG/1
TABLET ORAL
Qty: 90 TAB | Refills: 2 | Status: SHIPPED | OUTPATIENT
Start: 2017-11-06 | End: 2018-02-28 | Stop reason: ALTCHOICE

## 2017-11-06 RX ORDER — FENOFIBRATE 134 MG/1
CAPSULE ORAL
Qty: 90 CAP | Refills: 2 | Status: SHIPPED | OUTPATIENT
Start: 2017-11-06 | End: 2018-10-10 | Stop reason: SDUPTHER

## 2017-11-06 RX ORDER — ATORVASTATIN CALCIUM 40 MG/1
TABLET, FILM COATED ORAL
Qty: 90 TAB | Refills: 2 | Status: SHIPPED | OUTPATIENT
Start: 2017-11-06 | End: 2018-10-10 | Stop reason: SDUPTHER

## 2017-11-30 ENCOUNTER — OFFICE VISIT (OUTPATIENT)
Dept: FAMILY MEDICINE CLINIC | Age: 67
End: 2017-11-30

## 2017-11-30 VITALS
HEART RATE: 71 BPM | HEIGHT: 64 IN | WEIGHT: 187.6 LBS | DIASTOLIC BLOOD PRESSURE: 75 MMHG | OXYGEN SATURATION: 98 % | RESPIRATION RATE: 16 BRPM | SYSTOLIC BLOOD PRESSURE: 112 MMHG | BODY MASS INDEX: 32.03 KG/M2 | TEMPERATURE: 98 F

## 2017-11-30 DIAGNOSIS — E83.52 HYPERCALCEMIA: Primary | ICD-10-CM

## 2017-11-30 DIAGNOSIS — R73.9 ELEVATED BLOOD SUGAR: ICD-10-CM

## 2017-11-30 DIAGNOSIS — E55.9 VITAMIN D DEFICIENCY: ICD-10-CM

## 2017-11-30 DIAGNOSIS — E78.00 ELEVATED CHOLESTEROL: ICD-10-CM

## 2017-11-30 NOTE — PROGRESS NOTES
Chief Complaint   Patient presents with    Labs     Patient is fasting      Patient seen in the office today for fasting labs

## 2017-11-30 NOTE — MR AVS SNAPSHOT
Visit Information Date & Time Provider Department Dept. Phone Encounter #  
 11/30/2017  7:00 AM Ramona Ayon MD [address] [facility] [phone] [id number] Upcoming Health Maintenance Date Due DTaP/Tdap/Td series (1 - Tdap) 8/29/1972 Influenza Age 5 to Adult 8/1/2017 GLAUCOMA SCREENING Q2Y 2/1/2018 Pneumococcal 65+ High/Highest Risk (2 of 2 - PCV13) 5/31/2018 FOBT Q 1 YEAR AGE 50-75 5/31/2018 MEDICARE YEARLY EXAM 8/24/2018 BREAST CANCER SCRN MAMMOGRAM 11/29/2018 Allergies as of 11/30/2017  Review Complete On: 11/30/2017 By: Ramona Ayon MD  
  
 Severity Noted Reaction Type Reactions Codeine  10/18/2016    Nausea and Vomiting Erythromycin  10/18/2016    Nausea and Vomiting Current Immunizations  Reviewed on 11/30/2017 Name Date Influenza High Dose Vaccine PF 11/1/2017, 10/18/2016 Pneumococcal Polysaccharide (PPSV-23) 5/31/2017 Zoster Vaccine, Live 1/1/2015 Reviewed by Ramona Ayon MD on 11/30/2017 at  7:35 AM  
 Reviewed by Ramona Ayon MD on 11/30/2017 at  7:37 AM  
You Were Diagnosed With   
  
 Codes Comments Hypercalcemia    -  Primary ICD-10-CM: L49.45 
ICD-9-CM: 275.42 Vitamin D deficiency     ICD-10-CM: E55.9 ICD-9-CM: 268.9 Elevated blood sugar     ICD-10-CM: R73.9 ICD-9-CM: 790.29 Elevated cholesterol     ICD-10-CM: E78.00 ICD-9-CM: 272.0 Vitals BP Pulse Temp Resp Height(growth percentile) Weight(growth percentile) 112/75 (BP 1 [facility]: Left arm, BP Patient Position: Sitting) 71 98 °F (36.7 °C) (Oral) 16 5' 4\" (1.626 m) 187 lb 9.6 oz (85.1 kg) SpO2 BMI OB Status Smoking Status 98% 32.2 kg/m2 Postmenopausal Former Smoker Vitals History BMI and BSA Data Body Mass Index Body Surface Area  
 32.2 kg/m 2 1.96 m 2 Preferred Pharmacy Pharmacy Name Phone  [facility] [address] [facility] [phone] Your Updated Medication List  
  
   
This list is accurate as of: 11/30/17  7:39 AM.  Always use your most recent med list.  
  
  
  
  
 aspirin delayed-release 81 mg tablet Take  by mouth daily. atorvastatin 40 mg tablet Commonly known as:  LIPITOR  
TAKE ONE TABLET BY MOUTH ONCE DAILY  
  
 bisoprolol-hydroCHLOROthiazide 5-6.25 mg per tablet Commonly known as:  [facility] TAKE ONE TABLET BY MOUTH ONCE DAILY Blood-Glucose Meter monitoring kit Please check blood sugar once daily. E11.9 Please provide with a freestyle product. CINNAMON 500 mg Cap Generic drug:  cinnamon bark TID  
  
 fenofibrate micronized 134 mg capsule Commonly known as:  LOFIBRA TAKE ONE CAPSULE BY MOUTH IN THE MORNING  
  
 FISH OIL PO Take 1,200 mg by mouth daily. Twice daily  
  
 glucose blood VI test strips strip Commonly known as:  ASCENSIA AUTODISC VI, ONE TOUCH ULTRA TEST VI Please check blood sugar once daily. E11.9 Please provide with a freestyle product. Lancets Misc Please check blood sugar once daily. E11.9 Please provide with a freestyle product. PROBIOTIC 4X 10-15 mg Tbec Generic drug:  B.infantis-B.ani-B.long-B.bifi Take  by mouth. TART CHERRY EXTRACT PO Take  by mouth. ULTRA COQ10 PO Take 200 mg by mouth. VITAMIN D3 1,000 unit Cap Generic drug:  cholecalciferol Take 2,000 Units by mouth daily. We Performed the Following CBC WITH AUTOMATED DIFF [90221 CPT(R)] HEMOGLOBIN A1C WITH EAG [89757 CPT(R)] LIPID PANEL [72923 CPT(R)] METABOLIC PANEL, COMPREHENSIVE [70370 CPT(R)] TSH 3RD GENERATION [39482 CPT(R)] VITAMIN D, 25 HYDROXY [id number] CPT(R)] To-Do List   
 12/16/2017 10:20 AM  
  Appointment with [facility] [address] at [address] [place] [address] ([phone]) Shower or bathe using soap and water.   Do not use deodorant, powder, perfumes, or lotion the day of your exam.  If your prior mammograms were not performed at [facility] 6 please bring films with you or forward prior images 2 days before your procedure. Check in at registration 15min before your appointment time unless you were instructed to do otherwise. A script is not necessary, but if you have one, please bring it on the day of the mammogram or have it faxed to the department. [facility] [phone] [facility]  [phone] [facility] 19 TED  [phone] [facility] [phone] [facility] 115[address] [facility] [phone] [facility]! Dear Solomon Mathur: 
Thank you for requesting a Helpstream account. Our records indicate that you already have an active Helpstream account. You can access your account anytime at [email or web address] Did you know that you can access your hospital and ER discharge instructions at any time in Helpstream? You can also review all of your test results from your hospital stay or ER visit. Additional Information If you have questions, please visit the Frequently Asked Questions section of the Helpstream website at [email or web address] Remember, Helpstream is NOT to be used for urgent needs. For medical emergencies, dial 911. Now available from your iPhone and Android! Please provide this summary of care documentation to your next provider. Your primary care clinician is listed as Latasha Paul. If you have any questions after today's visit, please call [phone].

## 2017-11-30 NOTE — PROGRESS NOTES
Chief Complaint   Patient presents with   [facility]     Patient is fasting      Patient seen in the office today for fasting labs  Pt has been working on diet and exercise, she has lost 25 pounds since the beginning of the year. Pt would like to stop cholesterol medication is possible. Pt is also interested in the new shingles vaccine, plan to get at the beginning of the year. Pt recently finished abx due to a dental infection  Subjective: (As above and below)     Chief Complaint   Patient presents with   [facility]     Patient is fasting      she is a 78y.o. year old female who presents for evaluation. Reviewed PmHx, RxHx, FmHx, SocHx, AllgHx and updated in chart. Review of Systems - negative except as listed above    Objective:     Vitals:    11/30/17 0715   BP: 112/75   Pulse: 71   Resp: 16   Temp: 98 °F (36.7 °C)   TempSrc: Oral   SpO2: 98%   Weight: 187 lb 9.6 oz (85.1 kg)   Height: 5' 4\" (1.626 m)     Physical Examination: General appearance - alert, well appearing, and in no distress  Mental status - normal mood, behavior, speech, dress, motor activity, and thought processes  Mouth - mucous membranes moist, pharynx normal without lesions  Chest - clear to auscultation, no wheezes, rales or rhonchi, symmetric air entry  Heart - normal rate, regular rhythm, normal S1, S2, no murmurs, rubs, clicks or gallops  Musculoskeletal - no joint tenderness, deformity or swelling  Extremities - peripheral pulses normal, no pedal edema, no clubbing or cyanosis    Assessment/ Plan:   1. Hypercalcemia  -check labs  - METABOLIC PANEL, COMPREHENSIVE    2. Vitamin D deficiency  - VITAMIN D, 25 HYDROXY    3. Elevated blood sugar  -wonderful work! Continue to work on diet and exercise  - CBC WITH AUTOMATED DIFF  - TSH 3RD GENERATION  - HEMOGLOBIN A1C WITH EAG    4.  Elevated cholesterol  -well controlled, consider medication decrease if values remain at goal  - LIPID PANEL  - METABOLIC PANEL, COMPREHENSIVE     Follow-up Disposition: As needed  I have discussed the diagnosis with the patient and the intended plan as seen in the above orders. The patient has received an after-visit summary and questions were answered concerning future plans.      Medication Side Effects and Warnings were discussed with patient: yes  Patient Labs were reviewed: yes  Patient Past Records were reviewed:  yes    Justin Roberson M.D.

## 2017-12-01 LAB
25(OH)D3+25(OH)D2 SERPL-MCNC: 33.1 NG/ML (ref 30–100)
ALBUMIN SERPL-MCNC: 4.4 G/DL (ref 3.6–4.8)
ALBUMIN/GLOB SERPL: 1.5 {RATIO} (ref 1.2–2.2)
ALP SERPL-CCNC: 68 IU/L (ref 39–117)
ALT SERPL-CCNC: 19 IU/L (ref 0–32)
AST SERPL-CCNC: 24 IU/L (ref 0–40)
BASOPHILS # BLD AUTO: 0 X10E3/UL (ref 0–0.2)
BASOPHILS NFR BLD AUTO: 0 %
BILIRUB SERPL-MCNC: 0.5 MG/DL (ref 0–1.2)
BUN SERPL-MCNC: 22 MG/DL (ref 8–27)
BUN/CREAT SERPL: 25 (ref 12–28)
CALCIUM SERPL-MCNC: 10.3 MG/DL (ref 8.7–10.3)
CHLORIDE SERPL-SCNC: 100 MMOL/L (ref 96–106)
CHOLEST SERPL-MCNC: 131 MG/DL (ref 100–199)
CO2 SERPL-SCNC: 24 MMOL/L (ref 18–29)
CREAT SERPL-MCNC: 0.88 MG/DL (ref 0.57–1)
EOSINOPHIL # BLD AUTO: 0.1 X10E3/UL (ref 0–0.4)
EOSINOPHIL NFR BLD AUTO: 1 %
ERYTHROCYTE [DISTWIDTH] IN BLOOD BY AUTOMATED COUNT: 15.1 % (ref 12.3–15.4)
EST. AVERAGE GLUCOSE BLD GHB EST-MCNC: 117 MG/DL
GFR SERPLBLD CREATININE-BSD FMLA CKD-EPI: 69 ML/MIN/1.73
GFR SERPLBLD CREATININE-BSD FMLA CKD-EPI: 79 ML/MIN/1.73
GLOBULIN SER CALC-MCNC: 2.9 G/DL (ref 1.5–4.5)
GLUCOSE SERPL-MCNC: 94 MG/DL (ref 65–99)
HBA1C MFR BLD: 5.7 % (ref 4.8–5.6)
HCT VFR BLD AUTO: 40.8 % (ref 34–46.6)
HDLC SERPL-MCNC: 53 MG/DL
HGB BLD-MCNC: 13.5 G/DL (ref 11.1–15.9)
IMM GRANULOCYTES # BLD: 0 X10E3/UL (ref 0–0.1)
IMM GRANULOCYTES NFR BLD: 0 %
INTERPRETATION, 910389: NORMAL
LDLC SERPL CALC-MCNC: 54 MG/DL (ref 0–99)
LYMPHOCYTES # BLD AUTO: 2.7 X10E3/UL (ref 0.7–3.1)
LYMPHOCYTES NFR BLD AUTO: 29 %
MCH RBC QN AUTO: 28.1 PG (ref 26.6–33)
MCHC RBC AUTO-ENTMCNC: 33.1 G/DL (ref 31.5–35.7)
MCV RBC AUTO: 85 FL (ref 79–97)
MONOCYTES # BLD AUTO: 0.7 X10E3/UL (ref 0.1–0.9)
MONOCYTES NFR BLD AUTO: 7 %
NEUTROPHILS # BLD AUTO: 5.9 X10E3/UL (ref 1.4–7)
NEUTROPHILS NFR BLD AUTO: 63 %
PLATELET # BLD AUTO: 285 X10E3/UL (ref 150–379)
POTASSIUM SERPL-SCNC: 4.7 MMOL/L (ref 3.5–5.2)
PROT SERPL-MCNC: 7.3 G/DL (ref 6–8.5)
RBC # BLD AUTO: 4.8 X10E6/UL (ref 3.77–5.28)
SODIUM SERPL-SCNC: 142 MMOL/L (ref 134–144)
TRIGL SERPL-MCNC: 121 MG/DL (ref 0–149)
TSH SERPL DL<=0.005 MIU/L-ACNC: 2.29 UIU/ML (ref 0.45–4.5)
VLDLC SERPL CALC-MCNC: 24 MG/DL (ref 5–40)
WBC # BLD AUTO: 9.4 X10E3/UL (ref 3.4–10.8)

## 2017-12-01 NOTE — PROGRESS NOTES
Diabetic risk greatly improved, huge improvement in the last 6 months. Keep up the good work! Cholesterol is perfect, please decrease to half a cholesterol pill daily and recheck in 3 months. All other labs are within normal limits. A message has been sent in Brozengo and the lab work released to the patient.

## 2017-12-05 ENCOUNTER — DOCUMENTATION ONLY (OUTPATIENT)
Dept: FAMILY MEDICINE CLINIC | Age: 67
End: 2017-12-05

## 2017-12-05 NOTE — PROGRESS NOTES
Patient is dropping off her glucose readings.  Patient's phone number is: [phone] Paperwork is in bin up front

## 2017-12-16 ENCOUNTER — HOSPITAL ENCOUNTER (OUTPATIENT)
Dept: MAMMOGRAPHY | Age: 67
Discharge: HOME | End: 2017-12-16
Attending: FAMILY MEDICINE
Payer: MEDICARE

## 2017-12-16 DIAGNOSIS — Z12.31 VISIT FOR SCREENING MAMMOGRAM: ICD-10-CM

## 2017-12-16 PROCEDURE — 77067 SCR MAMMO BI INCL CAD: CPT

## 2018-02-28 ENCOUNTER — OFFICE VISIT (OUTPATIENT)
Dept: FAMILY MEDICINE CLINIC | Age: 68
End: 2018-02-28

## 2018-02-28 VITALS
HEART RATE: 65 BPM | WEIGHT: 180 LBS | RESPIRATION RATE: 15 BRPM | HEIGHT: 64 IN | OXYGEN SATURATION: 98 % | DIASTOLIC BLOOD PRESSURE: 74 MMHG | SYSTOLIC BLOOD PRESSURE: 120 MMHG | BODY MASS INDEX: 30.73 KG/M2 | TEMPERATURE: 98 F

## 2018-02-28 DIAGNOSIS — R73.9 ELEVATED BLOOD SUGAR: ICD-10-CM

## 2018-02-28 DIAGNOSIS — R35.0 URINARY FREQUENCY: Primary | ICD-10-CM

## 2018-02-28 DIAGNOSIS — E55.9 VITAMIN D DEFICIENCY: ICD-10-CM

## 2018-02-28 DIAGNOSIS — R31.9 HEMATURIA, UNSPECIFIED TYPE: ICD-10-CM

## 2018-02-28 DIAGNOSIS — E78.00 ELEVATED CHOLESTEROL: ICD-10-CM

## 2018-02-28 LAB
BILIRUB UR QL STRIP: NEGATIVE
GLUCOSE UR-MCNC: NEGATIVE MG/DL
KETONES P FAST UR STRIP-MCNC: NEGATIVE MG/DL
PH UR STRIP: 6.5 [PH] (ref 4.6–8)
PROT UR QL STRIP: NEGATIVE
SP GR UR STRIP: 1.01 (ref 1–1.03)
UA UROBILINOGEN AMB POC: NORMAL (ref 0.2–1)
URINALYSIS CLARITY POC: CLEAR
URINALYSIS COLOR POC: YELLOW
URINE BLOOD POC: NORMAL
URINE LEUKOCYTES POC: NORMAL
URINE NITRITES POC: NEGATIVE

## 2018-02-28 NOTE — PROGRESS NOTES
Chief Complaint   Patient presents with    Labs     fasting     Urinary Frequency     started yesterday      Would like to discuss if she is drinking too much water? Would like to discuss if she should have another flu shot this year?    Results for orders placed or performed in visit on 02/28/18   AMB POC URINALYSIS DIP STICK AUTO W/O MICRO   Result Value Ref Range    Color (UA POC) Yellow     Clarity (UA POC) Clear     Glucose (UA POC) Negative Negative    Bilirubin (UA POC) Negative Negative    Ketones (UA POC) Negative Negative    Specific gravity (UA POC) 1.010 1.001 - 1.035    Blood (UA POC) Trace Negative    pH (UA POC) 6.5 4.6 - 8.0    Protein (UA POC) Negative Negative    Urobilinogen (UA POC) 0.2 mg/dL 0.2 - 1    Nitrites (UA POC) Negative Negative    Leukocyte esterase (UA POC) 1+ Negative

## 2018-02-28 NOTE — MR AVS SNAPSHOT
[address] [address] 
[phone] Patient: Shruthi Montes MRN: [id number] VDV:[id number] Visit Information Date & Time Provider Department Dept. Phone Encounter #  
 2/28/2018  7:00 AM Armen Paul MD [address] [facility] [phone] [id number] Upcoming Health Maintenance Date Due DTaP/Tdap/Td series (1 - Tdap) 8/29/1972 GLAUCOMA SCREENING Q2Y 2/1/2018 Pneumococcal 65+ High/Highest Risk (2 of 2 - PCV13) 5/31/2018 FOBT Q 1 YEAR AGE 50-75 5/31/2018 MEDICARE YEARLY EXAM 8/24/2018 BREAST CANCER SCRN MAMMOGRAM 12/16/2019 Allergies as of 2/28/2018  Review Complete On: 2/28/2018 By: Robbi Díaz MD  
  
 Severity Noted Reaction Type Reactions Codeine  10/18/2016    Nausea and Vomiting Erythromycin  10/18/2016    Nausea and Vomiting Current Immunizations  Reviewed on 11/30/2017 Name Date Influenza High Dose Vaccine PF 11/1/2017, 10/18/2016 Pneumococcal Polysaccharide (PPSV-23) 5/31/2017 Zoster Vaccine, Live 1/1/2015 Not reviewed this visit You Were Diagnosed With   
  
 Codes Comments Urinary frequency    -  Primary ICD-10-CM: R35.0 ICD-9-CM: 788.41 Vitamin D deficiency     ICD-10-CM: E55.9 ICD-9-CM: 268.9 Elevated blood sugar     ICD-10-CM: R73.9 ICD-9-CM: 790.29 Elevated cholesterol     ICD-10-CM: E78.00 ICD-9-CM: 272.0 Hematuria, unspecified type     ICD-10-CM: R31.9 ICD-9-CM: 599.70 Vitals BP Pulse Temp Resp Height(growth percentile) Weight(growth percentile) 120/74 65 98 °F (36.7 °C) 15 5' 4\" (1.626 m) 180 lb (81.6 kg) SpO2 BMI OB Status Smoking Status 98% 30.9 kg/m2 Postmenopausal Former Smoker Vitals History BMI and BSA Data Body Mass Index Body Surface Area 30.9 kg/m 2 1.92 m 2 Preferred Pharmacy Pharmacy Name Phone [address] [place] [phone] Your Updated Medication List  
  
   
This list is accurate as of 2/28/18  7:51 AM.  Always use your most recent med list.  
  
  
  
  
 aspirin delayed-release 81 mg tablet Take  by mouth daily. atorvastatin 40 mg tablet Commonly known as:  LIPITOR  
TAKE ONE TABLET BY MOUTH ONCE DAILY Blood-Glucose Meter monitoring kit Please check blood sugar once daily. E11.9 Please provide with a freestyle product. CINNAMON 500 mg Cap Generic drug:  cinnamon bark TID  
  
 conjugated estrogens 0.625 mg/gram vaginal cream  
Commonly known as:  PREMARIN Apply 0.5 g to affected area daily. D-MANNOSE PO Take  by mouth. Takes 9 caps daily  
  
 fenofibrate micronized 134 mg capsule Commonly known as:  LOFIBRA TAKE ONE CAPSULE BY MOUTH IN THE MORNING  
  
 FISH OIL PO Take 1,200 mg by mouth daily. Twice daily  
  
 glucose blood VI test strips strip Commonly known as:  ASCENSIA AUTODISC VI, ONE TOUCH ULTRA TEST VI Please check blood sugar once daily. E11.9 Please provide with a freestyle product. Lancets Misc Please check blood sugar once daily. E11.9 Please provide with a freestyle product. OTHER Take Vinegar with Mother in 8 oz of water 30 mins before each meal Monday- Friday. OTHER Drinks Lemon in 6 oz of water daily. PROBIOTIC 4X 10-15 mg Tbec Generic drug:  B.infantis-B.ani-B.long-B.bifi Take  by mouth. TART CHERRY EXTRACT PO Take  by mouth. ULTRA COQ10 PO Take 200 mg by mouth. VITAMIN D3 1,000 unit Cap Generic drug:  cholecalciferol Take 2,000 Units by mouth daily. We Performed the Following AMB POC URINALYSIS DIP STICK AUTO W/O MICRO [12394 CPT(R)] CBC WITH AUTOMATED DIFF [34380 CPT(R)] CULTURE, URINE [id number] CPT(R)] HEMOGLOBIN A1C WITH EAG [56347 CPT(R)] LIPID PANEL [90097 CPT(R)] METABOLIC PANEL, COMPREHENSIVE [32897 CPT(R)] TSH 3RD GENERATION [54683 CPT(R)] VITAMIN D, 25 HYDROXY [id number] CPT(R)] Introducing [facility] & [facility]! Dear Lilo Finder: 
Thank you for requesting a Cuciniale account. Our records indicate that you already have an active Cuciniale account. You can access your account anytime at [email or web address] Did you know that you can access your hospital and ER discharge instructions at any time in Cuciniale? You can also review all of your test results from your hospital stay or ER visit. Additional Information If you have questions, please visit the Frequently Asked Questions section of the Cuciniale website at [email or web address] Remember, Cuciniale is NOT to be used for urgent needs. For medical emergencies, dial 911. Now available from your iPhone and Android! Please provide this summary of care documentation to your next provider. Your primary care clinician is listed as Latasha Paul. If you have any questions after today's visit, please call [phone].

## 2018-02-28 NOTE — PROGRESS NOTES
Chief Complaint   Patient presents with    Labs     fasting     Urinary Frequency     started yesterday      Would like to discuss if she is drinking too much water. Would like to discuss if she should have another flu shot this year. Pt has continued to follow a healthy eating plan and has lost another 7 pounds since her last visit. Pt's last eye exam was 2 years ago. Subjective: (As above and below)     Chief Complaint   Patient presents with    Labs     fasting     Urinary Frequency     started yesterday     she is a 78y.o. year old female who presents for evaluation. Reviewed PmHx, RxHx, FmHx, SocHx, AllgHx and updated in chart. Review of Systems - negative except as listed above    Objective:     Vitals:    02/28/18 0714   BP: 120/74   Pulse: 65   Resp: 15   Temp: 98 °F (36.7 °C)   SpO2: 98%   Weight: 180 lb (81.6 kg)   Height: 5' 4\" (1.626 m)     Physical Examination: General appearance - alert, well appearing, and in no distress  Mental status - normal mood, behavior, speech, dress, motor activity, and thought processes  Mouth - mucous membranes moist, pharynx normal without lesions  Chest - clear to auscultation, no wheezes, rales or rhonchi, symmetric air entry  Heart - normal rate, regular rhythm, normal S1, S2, no murmurs, rubs, clicks or gallops  Musculoskeletal - no joint tenderness, deformity or swelling  Extremities - peripheral pulses normal, no pedal edema, no clubbing or cyanosis    Assessment/ Plan:   1. Urinary frequency  -check culture due to presence of trace blood  - AMB POC URINALYSIS DIP STICK AUTO W/O MICRO    2.  Vitamin D deficiency  - VITAMIN D, 25 HYDROXY    3. Elevated blood sugar  -check fasting labs, continue excellent weight control measures  - CBC WITH AUTOMATED DIFF  - HEMOGLOBIN A1C WITH EAG  - TSH 3RD GENERATION    4. Elevated cholesterol  - METABOLIC PANEL, COMPREHENSIVE  - LIPID PANEL     Follow-up Disposition: As needed  I have discussed the diagnosis with the patient and the intended plan as seen in the above orders. The patient has received an after-visit summary and questions were answered concerning future plans.      Medication Side Effects and Warnings were discussed with patient: yes  Patient Labs were reviewed: yes  Patient Past Records were reviewed:  yes    Rhona Lindquist M.D.

## 2018-03-01 LAB
25(OH)D3+25(OH)D2 SERPL-MCNC: 37.8 NG/ML (ref 30–100)
ALBUMIN SERPL-MCNC: 4.6 G/DL (ref 3.6–4.8)
ALBUMIN/GLOB SERPL: 1.8 {RATIO} (ref 1.2–2.2)
ALP SERPL-CCNC: 69 IU/L (ref 39–117)
ALT SERPL-CCNC: 19 IU/L (ref 0–32)
AST SERPL-CCNC: 26 IU/L (ref 0–40)
BASOPHILS # BLD AUTO: 0 X10E3/UL (ref 0–0.2)
BASOPHILS NFR BLD AUTO: 0 %
BILIRUB SERPL-MCNC: 0.5 MG/DL (ref 0–1.2)
BUN SERPL-MCNC: 21 MG/DL (ref 8–27)
BUN/CREAT SERPL: 22 (ref 12–28)
CALCIUM SERPL-MCNC: 10.6 MG/DL (ref 8.7–10.3)
CHLORIDE SERPL-SCNC: 98 MMOL/L (ref 96–106)
CHOLEST SERPL-MCNC: 145 MG/DL (ref 100–199)
CO2 SERPL-SCNC: 24 MMOL/L (ref 18–29)
CREAT SERPL-MCNC: 0.94 MG/DL (ref 0.57–1)
EOSINOPHIL # BLD AUTO: 0.1 X10E3/UL (ref 0–0.4)
EOSINOPHIL NFR BLD AUTO: 1 %
ERYTHROCYTE [DISTWIDTH] IN BLOOD BY AUTOMATED COUNT: 16 % (ref 12.3–15.4)
EST. AVERAGE GLUCOSE BLD GHB EST-MCNC: 117 MG/DL
GFR SERPLBLD CREATININE-BSD FMLA CKD-EPI: 63 ML/MIN/1.73
GFR SERPLBLD CREATININE-BSD FMLA CKD-EPI: 73 ML/MIN/1.73
GLOBULIN SER CALC-MCNC: 2.6 G/DL (ref 1.5–4.5)
GLUCOSE SERPL-MCNC: 104 MG/DL (ref 65–99)
HBA1C MFR BLD: 5.7 % (ref 4.8–5.6)
HCT VFR BLD AUTO: 41.5 % (ref 34–46.6)
HDLC SERPL-MCNC: 59 MG/DL
HGB BLD-MCNC: 13.6 G/DL (ref 11.1–15.9)
IMM GRANULOCYTES # BLD: 0 X10E3/UL (ref 0–0.1)
IMM GRANULOCYTES NFR BLD: 0 %
INTERPRETATION, 910389: NORMAL
LDLC SERPL CALC-MCNC: 67 MG/DL (ref 0–99)
LYMPHOCYTES # BLD AUTO: 2.5 X10E3/UL (ref 0.7–3.1)
LYMPHOCYTES NFR BLD AUTO: 33 %
MCH RBC QN AUTO: 28.5 PG (ref 26.6–33)
MCHC RBC AUTO-ENTMCNC: 32.8 G/DL (ref 31.5–35.7)
MCV RBC AUTO: 87 FL (ref 79–97)
MONOCYTES # BLD AUTO: 0.6 X10E3/UL (ref 0.1–0.9)
MONOCYTES NFR BLD AUTO: 7 %
NEUTROPHILS # BLD AUTO: 4.5 X10E3/UL (ref 1.4–7)
NEUTROPHILS NFR BLD AUTO: 59 %
PLATELET # BLD AUTO: 309 X10E3/UL (ref 150–379)
POTASSIUM SERPL-SCNC: 4.6 MMOL/L (ref 3.5–5.2)
PROT SERPL-MCNC: 7.2 G/DL (ref 6–8.5)
RBC # BLD AUTO: 4.78 X10E6/UL (ref 3.77–5.28)
SODIUM SERPL-SCNC: 138 MMOL/L (ref 134–144)
TRIGL SERPL-MCNC: 93 MG/DL (ref 0–149)
TSH SERPL DL<=0.005 MIU/L-ACNC: 1.95 UIU/ML (ref 0.45–4.5)
VLDLC SERPL CALC-MCNC: 19 MG/DL (ref 5–40)
WBC # BLD AUTO: 7.7 X10E3/UL (ref 3.4–10.8)

## 2018-03-02 LAB — BACTERIA UR CULT: NORMAL

## 2018-03-02 NOTE — PROGRESS NOTES
Calcium is slightly elevated. Cholesterol is beautiful! Stable very slight increase in risk for diabetes, unchanged from 3 months ago- but keep up the great work! All other labs are within normal limits. A message has been sent in [employer] and the lab work released to the patient.

## 2018-03-15 ENCOUNTER — OFFICE VISIT (OUTPATIENT)
Dept: FAMILY MEDICINE CLINIC | Age: 68
End: 2018-03-15

## 2018-03-15 VITALS
HEIGHT: 64 IN | HEART RATE: 78 BPM | BODY MASS INDEX: 30.39 KG/M2 | RESPIRATION RATE: 17 BRPM | TEMPERATURE: 99 F | SYSTOLIC BLOOD PRESSURE: 102 MMHG | WEIGHT: 178 LBS | OXYGEN SATURATION: 95 % | DIASTOLIC BLOOD PRESSURE: 69 MMHG

## 2018-03-15 DIAGNOSIS — R68.89 FLU-LIKE SYMPTOMS: Primary | ICD-10-CM

## 2018-03-15 DIAGNOSIS — J11.1 INFLUENZA: ICD-10-CM

## 2018-03-15 PROBLEM — Z94.81 BONE MARROW TRANSPLANT STATUS (HCC): Status: ACTIVE | Noted: 2018-03-15

## 2018-03-15 LAB
FLUAV+FLUBV AG NOSE QL IA.RAPID: NEGATIVE POS/NEG
FLUAV+FLUBV AG NOSE QL IA.RAPID: POSITIVE POS/NEG
VALID INTERNAL CONTROL?: YES

## 2018-03-15 NOTE — PROGRESS NOTES
Subjective:   Perri Burks is a 78 y.o. female who present complaining of flu-like symptoms: fevers, chills, myalgias, congestion, sore throat and cough for 4 days. She denies dyspnea or wheezing. Smoking status: non-smoker. Flu vaccine status: vaccinated currently. Relevant PMH: No pertinent additional PMH. Review of Systems  A comprehensive review of systems was negative except for that written in the HPI. 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 Ears normal.   Throat and pharynx normal.    Neck supple. No adenopathy in the neck. Sinuses non tender. The chest is clear. Assessment/Plan:   Influenza very likely from clinical presentation and seasonal pattern  Considerations for specific influenza anti-viral therapy: symptoms present > 48 hours, antiviral therapy unlikely to be effective  Symptomatic therapy suggested: rest, increase fluids and call prn if symptoms persist or worsen. Call or return to clinic prn if these symptoms worsen or fail to improve as anticipated. ICD-10-CM ICD-9-CM    1. Flu-like symptoms R68.89 780.99 AMB POC CARMEN INFLUENZA A/B TEST   2. Influenza J11.1 487.1      Encounter Diagnoses   Name Primary?  Flu-like symptoms Yes    Influenza      Orders Placed This Encounter    AMB POC CARMEN INFLUENZA A/B TEST   .

## 2018-03-15 NOTE — PROGRESS NOTES
Chief Complaint   Patient presents with    Fever     2/15/18 T-101.0    Cough    Nasal Discharge     clear    Headache       Pt seen in the office today for c/o of flu s/s   Has tried loratadine.  Reports taking Tylenol @ 5:45am   Afebrile at this time   Denies nausea or vomiting

## 2018-03-15 NOTE — MR AVS SNAPSHOT
[address] [address] 
[phone] Patient: Dina Dunham MRN: [id number] BUF:[id number] Visit Information Date & Time Provider Department Dept. Phone Encounter #  
 3/15/2018  1:15 PM Kasia Ignacio MD [address] [facility] [phone] [id number] Upcoming Health Maintenance Date Due DTaP/Tdap/Td series (1 - Tdap) 8/29/1972 GLAUCOMA SCREENING Q2Y 2/1/2018 Pneumococcal 65+ High/Highest Risk (2 of 2 - PCV13) 5/31/2018 FOBT Q 1 YEAR AGE 50-75 5/31/2018 MEDICARE YEARLY EXAM 8/24/2018 BREAST CANCER SCRN MAMMOGRAM 12/16/2019 Allergies as of 3/15/2018  Review Complete On: 3/15/2018 By: Kasia Ignacio MD  
  
 Severity Noted Reaction Type Reactions Codeine  10/18/2016    Nausea and Vomiting Erythromycin  10/18/2016    Nausea and Vomiting Current Immunizations  Reviewed on 11/30/2017 Name Date Influenza High Dose Vaccine PF 11/1/2017, 10/18/2016 Pneumococcal Polysaccharide (PPSV-23) 5/31/2017 Zoster Vaccine, Live 1/1/2015 Not reviewed this visit You Were Diagnosed With   
  
 Codes Comments Flu-like symptoms    -  Primary ICD-10-CM: R68.89 ICD-9-CM: 780.99 Influenza     ICD-10-CM: J11.1 ICD-9-CM: 487. 1 Vitals BP Pulse Temp Resp Height(growth percentile) Weight(growth percentile) 102/69 (BP 1 [facility]: Left arm, BP Patient Position: Sitting) 78 99 °F (37.2 °C) (Oral) 17 5' 4\" (1.626 m) 178 lb (80.7 kg) SpO2 BMI OB Status Smoking Status 95% 30.55 kg/m2 Postmenopausal Former Smoker Vitals History BMI and BSA Data Body Mass Index Body Surface Area 30.55 kg/m 2 1.91 m 2 Preferred Pharmacy Pharmacy Name Phone [address] [phone] Your Updated Medication List  
  
   
 This list is accurate as of 3/15/18  1:57 PM.  Always use your most recent med list.  
  
  
  
  
 aspirin delayed-release 81 mg tablet Take  by mouth daily. atorvastatin 40 mg tablet Commonly known as:  LIPITOR  
TAKE ONE TABLET BY MOUTH ONCE DAILY Blood-Glucose Meter monitoring kit Please check blood sugar once daily. E11.9 Please provide with a freestyle product. CINNAMON 500 mg Cap Generic drug:  cinnamon bark TID  
  
 conjugated estrogens 0.625 mg/gram vaginal cream  
Commonly known as:  PREMARIN Apply 0.5 g to affected area daily. D-MANNOSE PO Take  by mouth. Takes 9 caps daily  
  
 fenofibrate micronized 134 mg capsule Commonly known as:  LOFIBRA TAKE ONE CAPSULE BY MOUTH IN THE MORNING  
  
 FISH OIL PO Take 1,200 mg by mouth daily. Twice daily  
  
 glucose blood VI test strips strip Commonly known as:  ASCENSIA AUTODISC VI, ONE TOUCH ULTRA TEST VI Please check blood sugar once daily. E11.9 Please provide with a freestyle product. Lancets Misc Please check blood sugar once daily. E11.9 Please provide with a freestyle product. OTHER Take Vinegar with Mother in 8 oz of water 30 mins before each meal Monday- Friday. OTHER Drinks Lemon in 6 oz of water daily. PROBIOTIC 4X 10-15 mg Tbec Generic drug:  B.infantis-B.ani-B.long-B.bifi Take  by mouth. TART CHERRY EXTRACT PO Take  by mouth. ULTRA COQ10 PO Take 200 mg by mouth. VITAMIN D3 1,000 unit Cap Generic drug:  cholecalciferol Take 2,000 Units by mouth daily. We Performed the Following AMB POC CARMEN INFLUENZA A/B TEST [43938 CPT(R)] Patient Instructions Influenza (Flu): Care Instructions Your Care Instructions Influenza (flu) is an infection in the lungs and breathing passages. It is caused by the influenza virus.  There are different strains, or types, of the flu virus from year to year. Unlike the common cold, the flu comes on suddenly and the symptoms, such as a cough, congestion, fever, chills, fatigue, aches, and pains, are more severe. These symptoms may last up to 10 days. Although the flu can make you feel very sick, it usually doesn't cause serious health problems. Home treatment is usually all you need for flu symptoms. But your doctor may prescribe antiviral medicine to prevent other health problems, such as pneumonia, from developing. Older people and those who have a long-term health condition, such as lung disease, are most at risk for having pneumonia or other health problems. Follow-up care is a key part of your treatment and safety. Be sure to make and go to all appointments, and call your doctor if you are having problems. It's also a good idea to know your test results and keep a list of the medicines you take. How can you care for yourself at home? · Get plenty of rest. 
· Drink plenty of fluids, enough so that your urine is light yellow or clear like water. If you have kidney, heart, or liver disease and have to limit fluids, talk with your doctor before you increase the amount of fluids you drink. · Take an over-the-counter pain medicine if needed, such as acetaminophen (Tylenol), ibuprofen (Advil, Motrin), or naproxen (Aleve), to relieve fever, headache, and muscle aches. Read and follow all instructions on the label. No one younger than 20 should take aspirin. It has been linked to Reye syndrome, a serious illness. · Do not smoke. Smoking can make the flu worse. If you need help quitting, talk to your doctor about stop-smoking programs and medicines. These can increase your chances of quitting for good. · Breathe moist air from a hot shower or from a sink filled with hot water to help clear a stuffy nose. · Before you use cough and cold medicines, check the label.  These medicines may not be safe for young children or for people with certain health problems. · If the skin around your nose and lips becomes sore, put some petroleum jelly on the area. · To ease coughing: ¨ Drink fluids to soothe a scratchy throat. ¨ Suck on cough drops or plain hard candy. ¨ Take an over-the-counter cough medicine that contains dextromethorphan to help you get some sleep. Read and follow all instructions on the label. ¨ Raise your head at night with an extra pillow. This may help you rest if coughing keeps you awake. · Take any prescribed medicine exactly as directed. Call your doctor if you think you are having a problem with your medicine. To avoid spreading the flu · Wash your hands regularly, and keep your hands away from your face. · Stay home from school, work, and other public places until you are feeling better and your fever has been gone for at least 24 hours. The fever needs to have gone away on its own without the help of medicine. · Ask people living with you to talk to their doctors about preventing the flu. They may get antiviral medicine to keep from getting the flu from you. · To prevent the flu in the future, get a flu vaccine every fall. Encourage people living with you to get the vaccine. · Cover your mouth when you cough or sneeze. When should you call for help? Call 911 anytime you think you may need emergency care. For example, call if: 
? · You have severe trouble breathing. ?Call your doctor now or seek immediate medical care if: 
? · You have new or worse trouble breathing. ? · You seem to be getting much sicker. ? · You feel very sleepy or confused. ? · You have a new or higher fever. ? · You get a new rash. ? Watch closely for changes in your health, and be sure to contact your doctor if: 
? · You begin to get better and then get worse. ? · You are not getting better after 1 week. Where can you learn more? Go to http://dana-josi.info/. Enter T470 in the search box to learn more about \"Influenza (Flu): Care Instructions. \" Current as of: May 12, 2017 Content Version: 11.4 © 4016-7203 Your.MD. Care instructions adapted under license by [employer] (which disclaims liability or warranty for this information). If you have questions about a medical condition or this instruction, always ask your healthcare professional. [address] any warranty or liability for your use of this information. Introducing [facility] & HEALTH SERVICES! [address]: 
Thank you for requesting a [employer] account. Our records indicate that you already have an active [employer] account. You can access your account anytime at [email or web address] Did you know that you can access your hospital and ER discharge instructions at any time in [employer]? You can also review all of your test results from your hospital stay or ER visit. Additional Information If you have questions, please visit the Frequently Asked Questions section of the [employer] website at [email or web address] Remember, [employer] is NOT to be used for urgent needs. For medical emergencies, dial 911. Now available from your iPhone and Android! Please provide this summary of care documentation to your next provider. Your primary care clinician is listed as Latasha Paul. If you have any questions after today's visit, please call [phone].

## 2018-03-15 NOTE — PATIENT INSTRUCTIONS

## 2018-03-20 ENCOUNTER — OFFICE VISIT (OUTPATIENT)
Dept: FAMILY MEDICINE CLINIC | Age: 68
End: 2018-03-20

## 2018-03-20 VITALS
WEIGHT: 179 LBS | OXYGEN SATURATION: 97 % | HEART RATE: 74 BPM | DIASTOLIC BLOOD PRESSURE: 75 MMHG | SYSTOLIC BLOOD PRESSURE: 113 MMHG | TEMPERATURE: 98.3 F | BODY MASS INDEX: 30.56 KG/M2 | HEIGHT: 64 IN | RESPIRATION RATE: 20 BRPM

## 2018-03-20 DIAGNOSIS — J40 BRONCHITIS: Primary | ICD-10-CM

## 2018-03-20 DIAGNOSIS — J11.1 INFLUENZA WITH RESPIRATORY MANIFESTATION: ICD-10-CM

## 2018-03-20 RX ORDER — GUAIFENESIN 600 MG/1
600 TABLET, EXTENDED RELEASE ORAL 2 TIMES DAILY
Qty: 60 TAB | Refills: 0 | Status: SHIPPED | OUTPATIENT
Start: 2018-03-20 | End: 2019-12-19

## 2018-03-20 RX ORDER — CEFDINIR 300 MG/1
300 CAPSULE ORAL 2 TIMES DAILY
Qty: 20 CAP | Refills: 0 | Status: SHIPPED | OUTPATIENT
Start: 2018-03-20 | End: 2018-03-30

## 2018-03-20 NOTE — MR AVS SNAPSHOT
[address] [address] 
[phone] Patient: Dulce Dean MRN: [id number] [facility]: Visit Information Date & Time Provider Department Dept. Phone Encounter #  
 3/20/2018  3:30 PM Tatiana Tejada MD [address] [facility] [phone] [id number] Upcoming Health Maintenance Date Due DTaP/Tdap/Td series (1 - Tdap) 1972 GLAUCOMA SCREENING Q2Y 2018 Pneumococcal 65+ High/Highest Risk (2 of 2 - PCV13) 2018 FOBT Q 1 YEAR AGE 50-75 2018 MEDICARE YEARLY EXAM 2018 BREAST CANCER SCRN MAMMOGRAM 2019 Allergies as of 3/20/2018  Review Complete On: 3/20/2018 By: Tatiana Tejada MD  
  
 Severity Noted Reaction Type Reactions Codeine  10/18/2016    Nausea and Vomiting Erythromycin  10/18/2016    Nausea and Vomiting Current Immunizations  Reviewed on 2017 Name Date Influenza High Dose Vaccine PF 2017, 10/18/2016 Pneumococcal Polysaccharide (PPSV-23) 2017 Zoster Vaccine, Live 2015 Not reviewed this visit You Were Diagnosed With   
  
 Codes Comments Bronchitis    -  Primary ICD-10-CM: Q03 ICD-9-CM: 174 Influenza with respiratory manifestation     ICD-10-CM: J11.1 ICD-9-CM: 487. 1 Vitals BP Pulse Temp Resp Height(growth percentile) Weight(growth percentile) 113/75 (BP 1 [facility]: Left arm, BP Patient Position: Sitting) 74 98.3 °F (36.8 °C) (Oral) 20 5' 4\" (1.626 m) 179 lb (81.2 kg) SpO2 BMI OB Status Smoking Status 97% 30.73 kg/m2 Postmenopausal Former Smoker Vitals History BMI and BSA Data Body Mass Index Body Surface Area 30.73 kg/m 2 1.91 m 2 Preferred Pharmacy Pharmacy Name Phone [address] [phone] Your Updated Medication List  
  
   
 This list is accurate as of 3/20/18  4:01 PM.  Always use your most recent med list.  
  
  
  
  
 aspirin delayed-release 81 mg tablet Take  by mouth daily. atorvastatin 40 mg tablet Commonly known as:  LIPITOR  
TAKE ONE TABLET BY MOUTH ONCE DAILY Blood-Glucose Meter monitoring kit Please check blood sugar once daily. E11.9 Please provide with a freestyle product. cefdinir 300 mg capsule Commonly known as:  OMNICEF Take 1 Cap by mouth two (2) times a day for 10 days. CINNAMON 500 mg Cap Generic drug:  cinnamon bark TID  
  
 conjugated estrogens 0.625 mg/gram vaginal cream  
Commonly known as:  PREMARIN Apply 0.5 g to affected area daily. D-MANNOSE PO Take  by mouth. Takes 9 caps daily  
  
 fenofibrate micronized 134 mg capsule Commonly known as:  LOFIBRA TAKE ONE CAPSULE BY MOUTH IN THE MORNING  
  
 FISH OIL PO Take 1,200 mg by mouth daily. Twice daily  
  
 glucose blood VI test strips strip Commonly known as:  ASCENSIA AUTODISC VI, ONE TOUCH ULTRA TEST VI Please check blood sugar once daily. E11.9 Please provide with a freestyle product. guaiFENesin  mg ER tablet Commonly known as:  Callum & Callum Take 1 Tab by mouth two (2) times a day. Lancets Misc Please check blood sugar once daily. E11.9 Please provide with a freestyle product. OTHER Take Vinegar with Mother in 8 oz of water 30 mins before each meal Monday- Friday. OTHER Drinks Lemon in 6 oz of water daily. PROBIOTIC 4X 10-15 mg Tbec Generic drug:  B.infantis-B.ani-B.long-B.bifi Take  by mouth. TART CHERRY EXTRACT PO Take  by mouth. ULTRA COQ10 PO Take 200 mg by mouth. VITAMIN D3 1,000 unit Cap Generic drug:  cholecalciferol Take 2,000 Units by mouth daily. Prescriptions Sent to Pharmacy Refills  
 cefdinir (OMNICEF) 300 mg capsule 0 Sig: Take 1 Cap by mouth two (2) times a day for 10 days. Class: Normal  
 [facility] [address] [facility] [address] Ph #: [phone] Route: Oral  
 guaiFENesin ER (MUCINEX) 600 mg ER tablet 0 Sig: Take 1 Tab by mouth two (2) times a day. Class: Normal  
 Pharmacy: [facility] [address] Ph #: [phone] Route: Oral  
  
Patient Instructions Cefdinir (Omnicef) - (By mouth) Why this medicine is used:  
Treats bacterial infections. Contact a nurse or doctor right away if you have: · Blistering, peeling, red skin rash · Severe or bloody diarrhea Common side effects: · Mild diarrhea, nausea © 2017 [address] Information is for End User's use only and may not be sold, redistributed or otherwise used for commercial purposes. Patient Instructions History Introducing [facility] & HEALTH SERVICES! Dear Darryle [place]: 
Thank you for requesting a [employer] account. Our records indicate that you already have an active [employer] account. You can access your account anytime at [email or web address] Did you know that you can access your hospital and ER discharge instructions at any time in [employer]? You can also review all of your test results from your hospital stay or ER visit. Additional Information If you have questions, please visit the Frequently Asked Questions section of the [employer] website at [email or web address] Remember, [employer] is NOT to be used for urgent needs. For medical emergencies, dial 911. Now available from your iPhone and Android! Please provide this summary of care documentation to your next provider. Your primary care clinician is listed as Latasha Paul. If you have any questions after today's visit, please call [phone].

## 2018-03-20 NOTE — PATIENT INSTRUCTIONS
Cefdinir (Omnicef) - (By mouth)   Why this medicine is used:   Treats bacterial infections. Contact a nurse or doctor right away if you have:  · Blistering, peeling, red skin rash  · Severe or bloody diarrhea     Common side effects:  · Mild diarrhea, nausea  © 2017 [address] Information is for End User's use only and may not be sold, redistributed or otherwise used for commercial purposes.

## 2018-06-06 ENCOUNTER — OFFICE VISIT (OUTPATIENT)
Dept: FAMILY MEDICINE CLINIC | Age: 68
End: 2018-06-06

## 2018-06-06 VITALS
RESPIRATION RATE: 17 BRPM | OXYGEN SATURATION: 98 % | DIASTOLIC BLOOD PRESSURE: 78 MMHG | TEMPERATURE: 98.8 F | BODY MASS INDEX: 29.88 KG/M2 | SYSTOLIC BLOOD PRESSURE: 118 MMHG | HEIGHT: 64 IN | WEIGHT: 175 LBS | HEART RATE: 67 BPM

## 2018-06-06 DIAGNOSIS — Z12.11 SCREEN FOR COLON CANCER: ICD-10-CM

## 2018-06-06 DIAGNOSIS — E78.00 ELEVATED CHOLESTEROL: ICD-10-CM

## 2018-06-06 DIAGNOSIS — Z23 ENCOUNTER FOR IMMUNIZATION: ICD-10-CM

## 2018-06-06 DIAGNOSIS — E83.52 HYPERCALCEMIA: Primary | ICD-10-CM

## 2018-06-06 DIAGNOSIS — R73.9 ELEVATED BLOOD SUGAR: ICD-10-CM

## 2018-06-06 DIAGNOSIS — E55.9 VITAMIN D DEFICIENCY: ICD-10-CM

## 2018-06-06 NOTE — ACP (ADVANCE CARE PLANNING)
Advance Care Planning (ACP) Provider Conversation Snapshot    Date of ACP Conversation: 06/06/18  Persons included in Conversation:  patient  Length of ACP Conversation in minutes:  <16 minutes (Non-Billable)    Authorized Decision Maker (if patient is incapable of making informed decisions):    This person is:   Her sons, Mary Lou Siu and Jung Flanagan          For Patients with Decision Making Capacity:   Values/Goals: Exploration of values, goals, and preferences if recovery is not expected, even with continued medical treatment in the event of:  Imminent death  Severe, permanent brain injury    Conversation Outcomes / Follow-Up Plan:   Recommended completion of Advance Directive form after review of ACP materials and conversation with prospective healthcare agent fv

## 2018-06-06 NOTE — MR AVS SNAPSHOT
[address] [address] 
[phone] Patient: Ty Pineda MRN: [id number] Visit Information Date & Time Provider Department Dept. Phone Encounter #  
 6/6/2018  7:15 AM Bard Jose MD [address] [facility] [phone] [id number] Upcoming Health Maintenance Date Due DTaP/Tdap/Td series (1 - Tdap) 8/29/1972 GLAUCOMA SCREENING Q2Y 2/1/2018 Pneumococcal 65+ High/Highest Risk (2 of 2 - PCV13) 5/31/2018 FOBT Q 1 YEAR AGE 50-75 5/31/2018 Influenza Age 5 to Adult 8/1/2018 MEDICARE YEARLY EXAM 8/24/2018 BREAST CANCER SCRN MAMMOGRAM 12/16/2019 Allergies as of 6/6/2018  Review Complete On: 6/6/2018 By: Bard Jose MD  
  
 Severity Noted Reaction Type Reactions Codeine  10/18/2016    Nausea and Vomiting Erythromycin  10/18/2016    Nausea and Vomiting Current Immunizations  Reviewed on 11/30/2017 Name Date Influenza High Dose Vaccine PF 11/1/2017, 10/18/2016 Pneumococcal Polysaccharide (PPSV-23) 5/31/2017 Tdap  Incomplete Zoster Vaccine, Live 1/1/2015 Not reviewed this visit You Were Diagnosed With   
  
 Codes Comments Hypercalcemia    -  Primary ICD-10-CM: L78.83 
ICD-9-CM: 275.42 Vitamin D deficiency     ICD-10-CM: E55.9 ICD-9-CM: 268.9 Elevated blood sugar     ICD-10-CM: R73.9 ICD-9-CM: 790.29 Elevated cholesterol     ICD-10-CM: E78.00 ICD-9-CM: 272.0 Screen for colon cancer     ICD-10-CM: Z12.11 ICD-9-CM: V76.51 Encounter for immunization     ICD-10-CM: U68 ICD-9-CM: V03.89 Vitals BP Pulse Temp Resp Height(growth percentile) Weight(growth percentile) 118/78 (BP 1 [facility]: Left arm, BP Patient Position: Sitting) 67 98.8 °F (37.1 °C) (Oral) 17 5' 4\" (1.626 m) 175 lb (79.4 kg) SpO2 BMI OB Status Smoking Status 98% 30.04 kg/m2 Postmenopausal Former Smoker Vitals History BMI and BSA Data Body Mass Index Body Surface Area 30.04 kg/m 2 1.89 m 2 Preferred Pharmacy Pharmacy Name Phone [facility] [address] [phone] Your Updated Medication List  
  
   
This list is accurate as of 6/6/18  7:52 AM.  Always use your most recent med list.  
  
  
  
  
 aspirin delayed-release 81 mg tablet Take  by mouth daily. atorvastatin 40 mg tablet Commonly known as:  LIPITOR  
TAKE ONE TABLET BY MOUTH ONCE DAILY Blood-Glucose Meter monitoring kit Please check blood sugar once daily. E11.9 Please provide with a freestyle product. CINNAMON 500 mg Cap Generic drug:  cinnamon bark TID  
  
 conjugated estrogens 0.625 mg/gram vaginal cream  
Commonly known as:  PREMARIN Apply 0.5 g to affected area daily. D-MANNOSE PO Take  by mouth. Takes 9 caps daily  
  
 fenofibrate micronized 134 mg capsule Commonly known as:  LOFIBRA TAKE ONE CAPSULE BY MOUTH IN THE MORNING  
  
 FISH OIL PO Take 1,200 mg by mouth daily. Twice daily  
  
 glucose blood VI test strips strip Commonly known as:  ASCENSIA AUTODISC VI, ONE TOUCH ULTRA TEST VI Please check blood sugar once daily. E11.9 Please provide with a freestyle product. guaiFENesin  mg ER tablet Commonly known as:  [address] Take 1 Tab by mouth two (2) times a day. Lancets Misc Please check blood sugar once daily. E11.9 Please provide with a freestyle product. OTHER Take Vinegar with Mother in 8 oz of water 30 mins before each meal Monday- Friday. OTHER Drinks Lemon in 6 oz of water daily. PROBIOTIC 4X 10-15 mg Tbec Generic drug:  B.infantis-B.ani-B.long-B.bifi Take  by mouth. TART CHERRY EXTRACT PO Take  by mouth. ULTRA COQ10 PO Take 200 mg by mouth. VITAMIN D3 1,000 unit Cap Generic drug:  cholecalciferol Take 2,000 Units by mouth daily. We Performed the Following CBC WITH AUTOMATED DIFF [83874 CPT(R)] HEMOGLOBIN A1C WITH EAG [96222 CPT(R)] LIPID PANEL [67406 CPT(R)] METABOLIC PANEL, COMPREHENSIVE [77138 CPT(R)] OCCULT BLOOD, IMMUNOASSAY (FIT) [id number] CPT(R)] UT IMMUNIZ ADMIN,1 SINGLE/COMB VAC/TOXOID [id number] CPT(R)] TETANUS, DIPHTHERIA TOXOIDS AND ACELLULAR PERTUSSIS VACCINE (TDAP), IN INDIVIDS. >=7, IM [id number] CPT(R)] TSH 3RD GENERATION [87330 CPT(R)] VITAMIN D, 25 HYDROXY [id number] CPT(R)] Introducing [facility] SERVICES! Dear Obinna Sayres: 
Thank you for requesting a [facility] account. Our records indicate that you already have an active [facility] account. You can access your account anytime at [email or web address] Did you know that you can access your hospital and ER discharge instructions at any time in [facility]? You can also review all of your test results from your hospital stay or ER visit. Additional Information If you have questions, please visit the Frequently Asked Questions section of the [facility] website at [email or web address] Remember, [facility] is NOT to be used for urgent needs. For medical emergencies, dial 911. Now available from your iPhone and Android! Please provide this summary of care documentation to your next provider. Your primary care clinician is listed as Latasha [place]. If you have any questions after today's visit, please call [phone].

## 2018-06-06 NOTE — PROGRESS NOTES
Chief Complaint   Patient presents with    Follow-up     3 month     Labs     Pt is fasting    Breast pain     right side       Pt seen in the office today for a 3 month follow up with fasting labs  Pt also has concerns with right sided breast pain. She states she has been working overtime and feel she may have over worked that side. Pt reports that she repetitively reaches with her right side. Subjective: (As above and below)     Chief Complaint   Patient presents with    Follow-up     3 month     Labs     Pt is fasting    Breast pain     right side     she is a 78y.o. year old female who presents for evaluation. Reviewed PmHx, RxHx, FmHx, SocHx, AllgHx and updated in chart. Review of Systems - negative except as listed above    Objective:     Vitals:    06/06/18 0722   BP: 118/78   Pulse: 67   Resp: 17   Temp: 98.8 °F (37.1 °C)   TempSrc: Oral   SpO2: 98%   Weight: 175 lb (79.4 kg)   Height: 5' 4\" (1.626 m)     Physical Examination: General appearance - alert, well appearing, and in no distress  Mental status - normal mood, behavior, speech, dress, motor activity, and thought processes  Mouth - mucous membranes moist, pharynx normal without lesions  Chest - clear to auscultation, no wheezes, rales or rhonchi, symmetric air entry  Heart - normal rate, regular rhythm, normal S1, S2, no murmurs, rubs, clicks or gallops  Extremities - peripheral pulses normal, no pedal edema, no clubbing or cyanosis    Assessment/ Plan:   1. Hypercalcemia  -check labs  - METABOLIC PANEL, COMPREHENSIVE  - TSH 3RD GENERATION    2. Vitamin D deficiency  - VITAMIN D, 25 HYDROXY    3. Elevated blood sugar  -total lost 37 pounds- keep up the great work!!  - METABOLIC PANEL, COMPREHENSIVE  - CBC WITH AUTOMATED DIFF  - HEMOGLOBIN A1C WITH EAG    4. Elevated cholesterol  - LIPID PANEL     Follow-up Disposition: As needed  I have discussed the diagnosis with the patient and the intended plan as seen in the above orders.   The patient has received an after-visit summary and questions were answered concerning future plans.      Medication Side Effects and Warnings were discussed with patient: yes  Patient Labs were reviewed: yes  Patient Past Records were reviewed:  yes    Julio Milner M.D.

## 2018-06-06 NOTE — PROGRESS NOTES
Written order received to administer 0.5 ml of Tetanus Toxoid, Reduced Diphtheria Toxoid, and Acellular Pertusis Vaccine Adsorbed BOOSTRIX. After obtaining written consent from the patient, Tdap vaccine was administered to left deltoid by Oneal Zabala LPN. [address]: 20510-863-56, Lot:[id number], Exp: 10/2/20 Manf: Yorder.  Patient tolerated procedure well

## 2018-06-07 LAB
25(OH)D3+25(OH)D2 SERPL-MCNC: 34.9 NG/ML (ref 30–100)
ALBUMIN SERPL-MCNC: 4.6 G/DL (ref 3.6–4.8)
ALBUMIN/GLOB SERPL: 1.8 {RATIO} (ref 1.2–2.2)
ALP SERPL-CCNC: 68 IU/L (ref 39–117)
ALT SERPL-CCNC: 15 IU/L (ref 0–32)
AST SERPL-CCNC: 25 IU/L (ref 0–40)
BASOPHILS # BLD AUTO: 0 X10E3/UL (ref 0–0.2)
BASOPHILS NFR BLD AUTO: 0 %
BILIRUB SERPL-MCNC: 0.3 MG/DL (ref 0–1.2)
BUN SERPL-MCNC: 23 MG/DL (ref 8–27)
BUN/CREAT SERPL: 21 (ref 12–28)
CALCIUM SERPL-MCNC: 10.9 MG/DL (ref 8.7–10.3)
CHLORIDE SERPL-SCNC: 100 MMOL/L (ref 96–106)
CHOLEST SERPL-MCNC: 178 MG/DL (ref 100–199)
CO2 SERPL-SCNC: 26 MMOL/L (ref 18–29)
CREAT SERPL-MCNC: 1.09 MG/DL (ref 0.57–1)
EOSINOPHIL # BLD AUTO: 0.1 X10E3/UL (ref 0–0.4)
EOSINOPHIL NFR BLD AUTO: 1 %
ERYTHROCYTE [DISTWIDTH] IN BLOOD BY AUTOMATED COUNT: 15.5 % (ref 12.3–15.4)
EST. AVERAGE GLUCOSE BLD GHB EST-MCNC: 123 MG/DL
GFR SERPLBLD CREATININE-BSD FMLA CKD-EPI: 53 ML/MIN/1.73
GFR SERPLBLD CREATININE-BSD FMLA CKD-EPI: 61 ML/MIN/1.73
GLOBULIN SER CALC-MCNC: 2.6 G/DL (ref 1.5–4.5)
GLUCOSE SERPL-MCNC: 96 MG/DL (ref 65–99)
HBA1C MFR BLD: 5.9 % (ref 4.8–5.6)
HCT VFR BLD AUTO: 44.6 % (ref 34–46.6)
HDLC SERPL-MCNC: 76 MG/DL
HGB BLD-MCNC: 13.9 G/DL (ref 11.1–15.9)
IMM GRANULOCYTES # BLD: 0 X10E3/UL (ref 0–0.1)
IMM GRANULOCYTES NFR BLD: 0 %
INTERPRETATION, 910389: NORMAL
INTERPRETATION: NORMAL
LDLC SERPL CALC-MCNC: 84 MG/DL (ref 0–99)
LYMPHOCYTES # BLD AUTO: 2.7 X10E3/UL (ref 0.7–3.1)
LYMPHOCYTES NFR BLD AUTO: 29 %
MCH RBC QN AUTO: 26.1 PG (ref 26.6–33)
MCHC RBC AUTO-ENTMCNC: 31.2 G/DL (ref 31.5–35.7)
MCV RBC AUTO: 84 FL (ref 79–97)
MONOCYTES # BLD AUTO: 0.5 X10E3/UL (ref 0.1–0.9)
MONOCYTES NFR BLD AUTO: 5 %
NEUTROPHILS # BLD AUTO: 6.1 X10E3/UL (ref 1.4–7)
NEUTROPHILS NFR BLD AUTO: 65 %
PDF IMAGE, 910387: NORMAL
PLATELET # BLD AUTO: 325 X10E3/UL (ref 150–379)
POTASSIUM SERPL-SCNC: 4.4 MMOL/L (ref 3.5–5.2)
PROT SERPL-MCNC: 7.2 G/DL (ref 6–8.5)
RBC # BLD AUTO: 5.32 X10E6/UL (ref 3.77–5.28)
SODIUM SERPL-SCNC: 138 MMOL/L (ref 134–144)
TRIGL SERPL-MCNC: 89 MG/DL (ref 0–149)
TSH SERPL DL<=0.005 MIU/L-ACNC: 2.4 UIU/ML (ref 0.45–4.5)
VLDLC SERPL CALC-MCNC: 18 MG/DL (ref 5–40)
WBC # BLD AUTO: 9.4 X10E3/UL (ref 3.4–10.8)

## 2018-06-08 DIAGNOSIS — E83.52 SERUM CALCIUM ELEVATED: Primary | ICD-10-CM

## 2018-06-08 NOTE — PROGRESS NOTES
Calcium is slightly elevated, recommend checking parathyroid hormone levels. Increase in risk for diabetes, please closely monitor diet and increase exercise   All other labs are within normal limits. A message has been sent in [employer] and the lab work released to the patient.

## 2018-06-13 LAB — HEMOCCULT STL QL IA: NEGATIVE

## 2018-06-13 NOTE — PROGRESS NOTES
Stool test negative  A message has been sent in [employer] and the lab work released to the patient.

## 2018-07-04 LAB
ALBUMIN SERPL-MCNC: 4.3 G/DL (ref 3.6–4.8)
ALBUMIN/GLOB SERPL: 1.6 {RATIO} (ref 1.2–2.2)
ALP SERPL-CCNC: 73 IU/L (ref 39–117)
ALT SERPL-CCNC: 19 IU/L (ref 0–32)
AST SERPL-CCNC: 27 IU/L (ref 0–40)
BILIRUB SERPL-MCNC: 0.4 MG/DL (ref 0–1.2)
BUN SERPL-MCNC: 27 MG/DL (ref 8–27)
BUN/CREAT SERPL: 26 (ref 12–28)
CALCIUM SERPL-MCNC: 10.6 MG/DL (ref 8.7–10.3)
CHLORIDE SERPL-SCNC: 100 MMOL/L (ref 96–106)
CO2 SERPL-SCNC: 27 MMOL/L (ref 20–29)
CREAT SERPL-MCNC: 1.03 MG/DL (ref 0.57–1)
GLOBULIN SER CALC-MCNC: 2.7 G/DL (ref 1.5–4.5)
GLUCOSE SERPL-MCNC: 84 MG/DL (ref 65–99)
INTERPRETATION: NORMAL
POTASSIUM SERPL-SCNC: 5 MMOL/L (ref 3.5–5.2)
PROT SERPL-MCNC: 7 G/DL (ref 6–8.5)
PTH-INTACT SERPL-MCNC: NORMAL PG/ML
SODIUM SERPL-SCNC: 139 MMOL/L (ref 134–144)

## 2018-07-13 NOTE — TELEPHONE ENCOUNTER
Please advise pt that an alternate abx was sent to pharmacy on file Date Of Previous Biopsy (Optional): 5/31/18

## 2018-10-10 RX ORDER — FENOFIBRATE 134 MG/1
CAPSULE ORAL
Qty: 90 CAP | Refills: 2 | Status: SHIPPED | OUTPATIENT
Start: 2018-10-10 | End: 2019-10-03 | Stop reason: SDUPTHER

## 2018-10-10 RX ORDER — BISOPROLOL FUMARATE AND HYDROCHLOROTHIAZIDE 5; 6.25 MG/1; MG/1
TABLET ORAL
Qty: 90 TAB | Refills: 2 | Status: SHIPPED | OUTPATIENT
Start: 2018-10-10 | End: 2019-10-03 | Stop reason: SDUPTHER

## 2018-10-10 RX ORDER — ATORVASTATIN CALCIUM 40 MG/1
TABLET, FILM COATED ORAL
Qty: 90 TAB | Refills: 2 | Status: SHIPPED | OUTPATIENT
Start: 2018-10-10 | End: 2019-10-03 | Stop reason: SDUPTHER

## 2018-12-12 ENCOUNTER — OFFICE VISIT (OUTPATIENT)
Dept: FAMILY MEDICINE CLINIC | Age: 68
End: 2018-12-12

## 2018-12-12 VITALS
OXYGEN SATURATION: 98 % | WEIGHT: 183 LBS | SYSTOLIC BLOOD PRESSURE: 112 MMHG | DIASTOLIC BLOOD PRESSURE: 77 MMHG | RESPIRATION RATE: 18 BRPM | HEIGHT: 64 IN | BODY MASS INDEX: 31.24 KG/M2 | HEART RATE: 71 BPM | TEMPERATURE: 97.9 F

## 2018-12-12 DIAGNOSIS — E55.9 VITAMIN D DEFICIENCY: ICD-10-CM

## 2018-12-12 DIAGNOSIS — L81.9 CHANGE IN PIGMENTED SKIN LESION: ICD-10-CM

## 2018-12-12 DIAGNOSIS — R73.9 ELEVATED BLOOD SUGAR: Primary | ICD-10-CM

## 2018-12-12 DIAGNOSIS — Z11.3 SCREEN FOR STD (SEXUALLY TRANSMITTED DISEASE): ICD-10-CM

## 2018-12-12 DIAGNOSIS — E78.00 ELEVATED CHOLESTEROL: ICD-10-CM

## 2018-12-12 DIAGNOSIS — Z00.00 MEDICARE ANNUAL WELLNESS VISIT, SUBSEQUENT: ICD-10-CM

## 2018-12-12 NOTE — PROGRESS NOTES
Chief Complaint   Patient presents with    Labs     fasting     Pt reports that she has been doing well. Pt is scheduled for a mammogram.     Pt reports that she was recently sexually active, is no longer with that  Person, would like to be screened for STDs. No symptoms or concerns per pt. This is the Subsequent Medicare Annual Wellness Exam, performed 12 months or more after the Initial AWV or the last Subsequent AWV    I have reviewed the patient's medical history in detail and updated the computerized patient record. History     Past Medical History:   Diagnosis Date    Hypertension     Leukemia ([address]       Past Surgical History:   Procedure Laterality Date    HX BONE MARROW TRANSPLANT      HX BUNIONECTOMY      HX CHOLECYSTECTOMY      HX ORTHOPAEDIC       Current Outpatient Medications   Medication Sig Dispense Refill    inulin (FIBER GUMMIES PO) Take  by mouth.  bisoprolol-hydroCHLOROthiazide (ZIAC) 5-6.25 mg per tablet TAKE ONE TABLET BY MOUTH ONCE DAILY 90 Tab 2    fenofibrate micronized (LOFIBRA) 134 mg capsule TAKE ONE CAPSULE BY MOUTH IN THE MORNING 90 Cap 2    atorvastatin (LIPITOR) 40 mg tablet TAKE ONE TABLET BY MOUTH ONCE DAILY (Patient taking differently: Take 1/2 tab taken every other day) 90 Tab 2    D-MANNOSE PO Take  by mouth. Takes 9 caps daily      OTHER Take Vinegar with Mother in 8 oz of water 30 mins before each meal Monday- Friday.  OTHER Drinks Lemon in 6 oz of water daily.  UBIDECARENONE (ULTRA COQ10 PO) Take 200 mg by mouth.  conjugated estrogens (PREMARIN) 0.625 mg/gram vaginal cream Apply 0.5 g to affected area daily. 30 g 0    B.infantis-B.ani-B.long-B.bifi (PROBIOTIC 4X) 10-15 mg TbEC Take  by mouth.  cinnamon bark (CINNAMON) 500 mg cap TID 90 Cap 3    Blood-Glucose Meter monitoring kit Please check blood sugar once daily. E11.9 Please provide with a freestyle product.  1 Kit 0    glucose blood VI test strips (ASCENSIA AUTODISC VI, ONE TOUCH ULTRA TEST VI) strip Please check blood sugar once daily. E11.9 Please provide with a freestyle product. 100 Strip 11    Lancets misc Please check blood sugar once daily. E11.9 Please provide with a freestyle product. 100 Each 11    DOCOSAHEXANOIC ACID/EPA (FISH OIL PO) Take 1,200 mg by mouth daily. Twice daily      aspirin delayed-release 81 mg tablet Take  by mouth daily.  cholecalciferol (VITAMIN D3) 1,000 unit cap Take 2,000 Units by mouth daily.  guaiFENesin ER (MUCINEX) 600 mg ER tablet Take 1 Tab by mouth two (2) times a day. (Patient not taking: Reported on 12/12/2018) 60 Tab 0    SOUR CHERRY EXTRACT (TART CHERRY EXTRACT PO) Take  by mouth. Allergies   Allergen Reactions    Codeine Nausea and Vomiting    Erythromycin Nausea and Vomiting     Family History   Problem Relation Age of Onset    Hypertension Mother     Diabetes Father     Breast Cancer Sister 62     Social History     Tobacco Use    Smoking status: Former Smoker    Smokeless tobacco: Never Used   Substance Use Topics    Alcohol use: No     Patient Active Problem List   Diagnosis Code    Hypercalcemia E83.52    Vitamin D deficiency E55.9    Elevated blood sugar R73.9    Elevated cholesterol E78.00    Bone marrow transplant status ([facility] [address] Z94.81       Depression Risk Factor Screening:     PHQ over the last two weeks 12/12/2018   Little interest or pleasure in doing things Not at all   Feeling down, depressed, irritable, or hopeless Not at all   Total Score PHQ 2 0     Alcohol Risk Factor Screening: You do not drink alcohol or very rarely. Functional Ability and Level of Safety:   Hearing Loss  Hearing is good. Activities of Daily Living  The home contains: [employer]  Patient does total self care    Fall Risk  Fall Risk Assessment, last 12 mths 12/12/2018   Able to walk? Yes   Fall in past 12 months?  -       Abuse Screen  Patient is not abused    Cognitive Screening   Evaluation of Cognitive Function:  Has your family/caregiver stated any concerns about your memory: no  Normal    Patient Care Team   Patient Care Team:  Kevin Penaloza MD as PCP - General (Family Practice)    Assessment/Plan   Education and counseling provided:  Are appropriate based on today's review and evaluation    Diagnoses and all orders for this visit:    1. Elevated blood sugar  -     CBC WITH AUTOMATED DIFF  -     HEMOGLOBIN A1C WITH EAG  -     TSH 3RD GENERATION    2. Elevated cholesterol  -     METABOLIC PANEL, COMPREHENSIVE  -     LIPID PANEL    3. Vitamin D deficiency  -     VITAMIN D, 25 HYDROXY    4. Screen for STD (sexually transmitted disease)  -     HIV 1/2 AG/AB, 4TH GENERATION,W RFLX CONFIRM  -     RPR  -     HEPATITIS C AB  -     NUSWAB VAGINITIS PLUS    5. Medicare annual wellness visit, subsequent        There are no preventive care reminders to display for this patient. Discussed the patient's BMI with her. The BMI follow up plan is as follows:     dietary management education, guidance, and counseling  encourage exercise  monitor weight  prescribed dietary intake    An After Visit Summary was printed and given to the patient.

## 2018-12-12 NOTE — PROGRESS NOTES
Chief Complaint   Patient presents with    Labs     fasting     1. Have you been to the ER, urgent care clinic since your last visit? Hospitalized since your last visit? No    2. Have you seen or consulted any other health care providers outside of the [address] [place] [address] since your last visit? Include any pap smears or colon screening.  Seen by [facility] in August    Visit Vitals  /77 (BP 1 [facility]: Left arm, BP Patient Position: Sitting)   Pulse 71   Temp 97.9 °F (36.6 °C) (Oral)   Resp 18   Ht 5' 4\" (1.626 m)   Wt 183 lb (83 kg)   SpO2 98%   BMI 31.41 kg/m²

## 2018-12-12 NOTE — PATIENT INSTRUCTIONS
Body Mass Index: Care Instructions  Your Care Instructions    Body mass index (BMI) can help you see if your weight is raising your risk for health problems. It uses a formula to compare how much you weigh with how tall you are. · A BMI lower than 18.5 is considered underweight. · A BMI between 18.5 and 24.9 is considered healthy. · A BMI between 25 and 29.9 is considered overweight. A BMI of 30 or higher is considered obese. If your BMI is in the normal range, it means that you have a lower risk for weight-related health problems. If your BMI is in the overweight or obese range, you may be at increased risk for weight-related health problems, such as high blood pressure, heart disease, stroke, arthritis or joint pain, and diabetes. If your BMI is in the underweight range, you may be at increased risk for health problems such as fatigue, lower protection (immunity) against illness, muscle loss, bone loss, hair loss, and hormone problems. BMI is just one measure of your risk for weight-related health problems. You may be at higher risk for health problems if you are not active, you eat an unhealthy diet, or you drink too much alcohol or use tobacco products. Follow-up care is a key part of your treatment and safety. Be sure to make and go to all appointments, and call your doctor if you are having problems. It's also a good idea to know your test results and keep a list of the medicines you take. How can you care for yourself at home? · Practice healthy eating habits. This includes eating plenty of fruits, vegetables, whole grains, lean protein, and low-fat dairy. · If your doctor recommends it, get more exercise. Walking is a good choice. Bit by bit, increase the amount you walk every day. Try for at least 30 minutes on most days of the week. · Do not smoke. Smoking can increase your risk for health problems. If you need help quitting, talk to your doctor about stop-smoking programs and medicines. These can increase your chances of quitting for good. · Limit alcohol to 2 drinks a day for men and 1 drink a day for women. Too much alcohol can cause health problems. If you have a BMI higher than 25  · Your doctor may do other tests to check your risk for weight-related health problems. This may include measuring the distance around your waist. A waist measurement of more than 40 inches in men or 35 inches in women can increase the risk of weight-related health problems. · Talk with your doctor about steps you can take to stay healthy or improve your health. You may need to make lifestyle changes to lose weight and stay healthy, such as changing your diet and getting regular exercise. If you have a BMI lower than 18.5  · Your doctor may do other tests to check your risk for health problems. · Talk with your doctor about steps you can take to stay healthy or improve your health. You may need to make lifestyle changes to gain or maintain weight and stay healthy, such as getting more healthy foods in your diet and doing exercises to build muscle. Where can you learn more? Go to http://dana-josi.info/. Enter S176 in the search box to learn more about \"Body Mass Index: Care Instructions. \"  Current as of: October 13, 2016  Content Version: 11.4  © 3632-0949 Healthwise, Incorporated. Care instructions adapted under license by [employer] (which disclaims liability or warranty for this information). If you have questions about a medical condition or this instruction, always ask your healthcare professional. [address] [address] any warranty or liability for your use of this information. Medicare Wellness Visit, Female     The best way to live healthy is to have a lifestyle where you eat a well-balanced diet, exercise regularly, limit alcohol use, and quit all forms of tobacco/nicotine, if applicable. Regular preventive services are another way to keep healthy. Preventive services (vaccines, screening tests, monitoring & exams) can help personalize your care plan, which helps you manage your own care. Screening tests can find health problems at the earliest stages, when they are easiest to treat. [facility] follows the current, evidence-based guidelines published by the [facility] Kenton Greer ([facility]) when recommending preventive services for our patients. Because we follow these guidelines, sometimes recommendations change over time as research supports it. (For example, mammograms used to be recommended annually. Even though Medicare will still pay for an annual mammogram, the newer guidelines recommend a mammogram every two years for women of average risk.)  Of course, you and your doctor may decide to screen more often for some diseases, based on your risk and your health status. Preventive services for you include:  - Medicare offers their members a free annual wellness visit, which is time for you and your primary care provider to discuss and plan for your preventive service needs. Take advantage of this benefit every year!  -All adults over the age of 72 should receive the recommended pneumonia vaccines. Current USPSTF guidelines recommend a series of two vaccines for the best pneumonia protection.   -All adults should have a flu vaccine yearly and a tetanus vaccine every 10 years. All adults age 61 and older should receive a shingles vaccine once in their lifetime.    -A bone mass density test is recommended when a woman turns 65 to screen for osteoporosis. This test is only recommended one time, as a screening. Some providers will use this same test as a disease monitoring tool if you already have osteoporosis.   -All adults age 38-68 who are overweight should have a diabetes screening test once every three years.   -Other screening tests and preventive services for persons with diabetes include: an eye exam to screen for diabetic retinopathy, a kidney function test, a foot exam, and stricter control over your cholesterol.   -Cardiovascular screening for adults with routine risk involves an electrocardiogram (ECG) at intervals determined by your doctor.   -Colorectal cancer screenings should be done for adults age 54-65 with no increased risk factors for colorectal cancer. There are a number of acceptable methods of screening for this type of cancer. Each test has its own benefits and drawbacks. Discuss with your doctor what is most appropriate for you during your annual wellness visit. The different tests include: colonoscopy (considered the best screening method), a fecal occult blood test, a fecal DNA test, and sigmoidoscopy. -Breast cancer screenings are recommended every other year for women of normal risk, age 54-69.  -Cervical cancer screenings for women over age 72 are only recommended with certain risk factors.   -All adults born between [facility] should be screened once for Hepatitis C. Here is a list of your current Health Maintenance items (your personalized list of preventive services) with a due date: There are no preventive care reminders to display for this patient.

## 2018-12-16 LAB
25(OH)D3+25(OH)D2 SERPL-MCNC: 33.4 NG/ML (ref 30–100)
A VAGINAE DNA VAG QL NAA+PROBE: ABNORMAL SCORE
ALBUMIN SERPL-MCNC: 4.3 G/DL (ref 3.6–4.8)
ALBUMIN/GLOB SERPL: 1.6 {RATIO} (ref 1.2–2.2)
ALP SERPL-CCNC: 62 IU/L (ref 39–117)
ALT SERPL-CCNC: 16 IU/L (ref 0–32)
AST SERPL-CCNC: 24 IU/L (ref 0–40)
BASOPHILS # BLD AUTO: 0 X10E3/UL (ref 0–0.2)
BASOPHILS NFR BLD AUTO: 0 %
BILIRUB SERPL-MCNC: 0.4 MG/DL (ref 0–1.2)
BUN SERPL-MCNC: 27 MG/DL (ref 8–27)
BUN/CREAT SERPL: 26 (ref 12–28)
BVAB2 DNA VAG QL NAA+PROBE: ABNORMAL SCORE
C ALBICANS DNA VAG QL NAA+PROBE: NEGATIVE
C GLABRATA DNA VAG QL NAA+PROBE: NEGATIVE
C TRACH RRNA SPEC QL NAA+PROBE: NEGATIVE
CALCIUM SERPL-MCNC: 10.4 MG/DL (ref 8.7–10.3)
CHLORIDE SERPL-SCNC: 102 MMOL/L (ref 96–106)
CHOLEST SERPL-MCNC: 168 MG/DL (ref 100–199)
CO2 SERPL-SCNC: 24 MMOL/L (ref 20–29)
CREAT SERPL-MCNC: 1.04 MG/DL (ref 0.57–1)
EOSINOPHIL # BLD AUTO: 0.1 X10E3/UL (ref 0–0.4)
EOSINOPHIL NFR BLD AUTO: 1 %
ERYTHROCYTE [DISTWIDTH] IN BLOOD BY AUTOMATED COUNT: 15.4 % (ref 12.3–15.4)
EST. AVERAGE GLUCOSE BLD GHB EST-MCNC: 120 MG/DL
GLOBULIN SER CALC-MCNC: 2.7 G/DL (ref 1.5–4.5)
GLUCOSE SERPL-MCNC: 93 MG/DL (ref 65–99)
HBA1C MFR BLD: 5.8 % (ref 4.8–5.6)
HCT VFR BLD AUTO: 40.3 % (ref 34–46.6)
HCV AB S/CO SERPL IA: 0.1 S/CO RATIO (ref 0–0.9)
HDLC SERPL-MCNC: 68 MG/DL
HGB BLD-MCNC: 13.5 G/DL (ref 11.1–15.9)
HIV 1+2 AB+HIV1 P24 AG SERPL QL IA: NON REACTIVE
IMM GRANULOCYTES # BLD: 0 X10E3/UL (ref 0–0.1)
IMM GRANULOCYTES NFR BLD: 0 %
INTERPRETATION, 910389: NORMAL
INTERPRETATION: NORMAL
LDLC SERPL CALC-MCNC: 84 MG/DL (ref 0–99)
LYMPHOCYTES # BLD AUTO: 2.4 X10E3/UL (ref 0.7–3.1)
LYMPHOCYTES NFR BLD AUTO: 31 %
MCH RBC QN AUTO: 28.5 PG (ref 26.6–33)
MCHC RBC AUTO-ENTMCNC: 33.5 G/DL (ref 31.5–35.7)
MCV RBC AUTO: 85 FL (ref 79–97)
MEGA1 DNA VAG QL NAA+PROBE: ABNORMAL SCORE
MONOCYTES # BLD AUTO: 0.4 X10E3/UL (ref 0.1–0.9)
MONOCYTES NFR BLD AUTO: 5 %
N GONORRHOEA RRNA SPEC QL NAA+PROBE: NEGATIVE
NEUTROPHILS # BLD AUTO: 4.8 X10E3/UL (ref 1.4–7)
NEUTROPHILS NFR BLD AUTO: 63 %
PDF IMAGE, 910387: NORMAL
PLATELET # BLD AUTO: 292 X10E3/UL (ref 150–379)
POTASSIUM SERPL-SCNC: 4.8 MMOL/L (ref 3.5–5.2)
PROT SERPL-MCNC: 7 G/DL (ref 6–8.5)
RBC # BLD AUTO: 4.73 X10E6/UL (ref 3.77–5.28)
RPR SER QL: NON REACTIVE
SODIUM SERPL-SCNC: 140 MMOL/L (ref 134–144)
T VAGINALIS RRNA SPEC QL NAA+PROBE: NEGATIVE
TRIGL SERPL-MCNC: 82 MG/DL (ref 0–149)
TSH SERPL DL<=0.005 MIU/L-ACNC: 2.4 UIU/ML (ref 0.45–4.5)
VLDLC SERPL CALC-MCNC: 16 MG/DL (ref 5–40)
WBC # BLD AUTO: 7.7 X10E3/UL (ref 3.4–10.8)

## 2018-12-17 NOTE — PROGRESS NOTES
Increase in risk for diabetes, please closely monitor diet and increase exercise   Vaginal swab shoes possible bacterial vaginosis, are you having any discharge symptoms? All other labs are within normal limits. A message has been sent in [employer] and the lab work released to the patient.

## 2018-12-29 ENCOUNTER — HOSPITAL ENCOUNTER (OUTPATIENT)
Dept: MAMMOGRAPHY | Age: 68
Discharge: HOME | End: 2018-12-29
Attending: FAMILY MEDICINE
Payer: MEDICARE

## 2018-12-29 DIAGNOSIS — Z12.39 SCREENING BREAST EXAMINATION: ICD-10-CM

## 2018-12-29 PROCEDURE — 77067 SCR MAMMO BI INCL CAD: CPT

## 2019-02-14 ENCOUNTER — OFFICE VISIT (OUTPATIENT)
Dept: FAMILY MEDICINE CLINIC | Age: 69
End: 2019-02-14

## 2019-02-14 VITALS
HEART RATE: 74 BPM | DIASTOLIC BLOOD PRESSURE: 67 MMHG | BODY MASS INDEX: 31.41 KG/M2 | RESPIRATION RATE: 18 BRPM | SYSTOLIC BLOOD PRESSURE: 122 MMHG | OXYGEN SATURATION: 97 % | TEMPERATURE: 97.4 F | HEIGHT: 64 IN

## 2019-02-14 DIAGNOSIS — R10.84 ABDOMINAL CRAMPING, GENERALIZED: Primary | ICD-10-CM

## 2019-02-14 LAB
BILIRUB UR QL STRIP: NEGATIVE
GLUCOSE UR-MCNC: NEGATIVE MG/DL
KETONES P FAST UR STRIP-MCNC: NEGATIVE MG/DL
PH UR STRIP: 6 [PH] (ref 4.6–8)
PROT UR QL STRIP: NEGATIVE
SP GR UR STRIP: 1.01 (ref 1–1.03)
UA UROBILINOGEN AMB POC: NORMAL (ref 0.2–1)
URINALYSIS CLARITY POC: CLEAR
URINALYSIS COLOR POC: YELLOW
URINE BLOOD POC: NEGATIVE
URINE LEUKOCYTES POC: NEGATIVE
URINE NITRITES POC: NEGATIVE

## 2019-02-14 NOTE — PROGRESS NOTES
Chief Complaint Patient presents with  Abdominal Cramping  
  pt reports having \"abdominal cramping off/on Saturday\" Pt reports that she woke up 5 days ago and had lower abdominal cramping with urination. Then she had a bowel movement, then had 2 more BM. Pt reports that she had cramps off and on all day. Symptoms resolved after that day. Subjective: (As above and below) Chief Complaint Patient presents with  Abdominal Cramping  
  pt reports having \"abdominal cramping off/on Saturday\"  
 
she is a 80y.o. year old female who presents for evaluation. Reviewed PmHx, RxHx, FmHx, SocHx, AllgHx and updated in chart. Review of Systems - negative except as listed above Objective:  
 
Vitals:  
 02/14/19 1033 BP: 122/67 Pulse: 74 Resp: 18 Temp: 97.4 °F (36.3 °C) TempSrc: Oral  
SpO2: 97% Weight: (P) 189 lb (85.7 kg) Height: 5' 4\" (1.626 m) Physical Examination: General appearance - alert, well appearing, and in no distress Mental status - normal mood, behavior, speech, dress, motor activity, and thought processes Mouth - mucous membranes moist, pharynx normal without lesions Chest - clear to auscultation, no wheezes, rales or rhonchi, symmetric air entry Heart - normal rate, regular rhythm, normal S1, S2, no murmurs, rubs, clicks or gallops Musculoskeletal - no joint tenderness, deformity or swelling Extremities - peripheral pulses normal, no pedal edema, no clubbing or cyanosis Assessment/ Plan: 1. Abdominal cramping, generalized 
-UA WNL, symptoms resolving - AMB POC URINALYSIS DIP STICK AUTO W/O MICRO Follow-up Disposition: As needed I have discussed the diagnosis with the patient and the intended plan as seen in the above orders. The patient has received an after-visit summary and questions were answered concerning future plans. Medication Side Effects and Warnings were discussed with patient: yes Patient Labs were reviewed: yes Patient Past Records were reviewed:  yes Treva Abraham M.D.

## 2019-02-14 NOTE — PROGRESS NOTES
Identified pt with two pt identifiers(name and ). Reviewed record in preparation for visit and have obtained necessary documentation. Chief Complaint Patient presents with  Abdominal Cramping  
  pt reports having \"abdominal cramping off/on Saturday\" There are no preventive care reminders to display for this patient. Vitals:  
 19 1033 BP: 122/67 Pulse: 74 Resp: 18 Temp: 97.4 °F (36.3 °C) TempSrc: Oral  
SpO2: 97% Weight: (P) 189 lb (85.7 kg) Height: 5' 4\" (1.626 m) PainSc:   0 - No pain Coordination of Care Questionnaire: 
:  
1) Have you been to an emergency room, urgent care, or hospitalized since your last visit? No 
 
2. Have seen or consulted any other health care provider since your last visit? No 
 
3) Do you have an Advanced Directive/ Living Will in place? Yes If yes, do we have a copy on file Unsure If no, would you like information No

## 2019-05-22 ENCOUNTER — OFFICE VISIT (OUTPATIENT)
Dept: FAMILY MEDICINE CLINIC | Age: 69
End: 2019-05-22

## 2019-05-22 VITALS
OXYGEN SATURATION: 96 % | HEIGHT: 64 IN | WEIGHT: 185 LBS | TEMPERATURE: 98.9 F | RESPIRATION RATE: 14 BRPM | BODY MASS INDEX: 31.58 KG/M2 | SYSTOLIC BLOOD PRESSURE: 120 MMHG | DIASTOLIC BLOOD PRESSURE: 78 MMHG | HEART RATE: 75 BPM

## 2019-05-22 DIAGNOSIS — S30.861A INFECTED INSECT BITE OF ABDOMEN, INITIAL ENCOUNTER: Primary | ICD-10-CM

## 2019-05-22 DIAGNOSIS — L08.9 INFECTED INSECT BITE OF ABDOMEN, INITIAL ENCOUNTER: Primary | ICD-10-CM

## 2019-05-22 DIAGNOSIS — E78.00 ELEVATED CHOLESTEROL: ICD-10-CM

## 2019-05-22 DIAGNOSIS — I10 ESSENTIAL HYPERTENSION: ICD-10-CM

## 2019-05-22 DIAGNOSIS — E83.52 HYPERCALCEMIA: ICD-10-CM

## 2019-05-22 DIAGNOSIS — Z12.11 COLON CANCER SCREENING: ICD-10-CM

## 2019-05-22 RX ORDER — MUPIROCIN 20 MG/G
OINTMENT TOPICAL 2 TIMES DAILY
Qty: 22 G | Refills: 0 | Status: SHIPPED | OUTPATIENT
Start: 2019-05-22 | End: 2019-12-19

## 2019-05-22 NOTE — PROGRESS NOTES
HISTORY OF PRESENT ILLNESS  Perri Garcia is a 80 y.o. female. HPI  Cardiovascular Review:  The patient has hypertension and hyperlipidemia. Diet and Lifestyle: generally follows a low fat low cholesterol diet, generally follows a low sodium diet, exercises sporadically, nonsmoker  Home BP Monitoring: is not measured at home. Pertinent ROS: taking medications as instructed, no medication side effects noted, no TIA's, no chest pain on exertion, no dyspnea on exertion, no swelling of ankles. Ear pressure/Tick bite:  pt has concerns with 2 placed on her body that she has been itching  -pt states she thought it may be a tick but she isnt sure  -pt states that she itches at the base of her head and under right breast  Pt went to urgent care as she was not feeling well and feeling dizzy  -pt states they saw fluid in the right ear and that she should start taking allergy meds  -pt states she tried allegra and claratin and those did not work  -pt started zyrtec and states she feels it is helping    Colon screening:  Needs FIT test for the year screening    Patient Active Problem List    Diagnosis Date Noted    [facility]    Elevated blood sugar 11/30/2017    Elevated cholesterol 11/30/2017    Hypercalcemia 01/17/2017    Vitamin D deficiency 01/17/2017     Current Outpatient Medications   Medication Sig Dispense Refill    mupirocin (BACTROBAN) 2 % ointment Apply  to affected area two (2) times a day. 22 g 0    bisoprolol-hydroCHLOROthiazide (ZIAC) 5-6.25 mg per tablet TAKE ONE TABLET BY MOUTH ONCE DAILY 90 Tab 2    fenofibrate micronized (LOFIBRA) 134 mg capsule TAKE ONE CAPSULE BY MOUTH IN THE MORNING 90 Cap 2    atorvastatin (LIPITOR) 40 mg tablet TAKE ONE TABLET BY MOUTH ONCE DAILY (Patient taking differently: Take 1/4 tab taken every other day) 90 Tab 2    D-MANNOSE PO Take  by mouth. Takes 9 caps daily      OTHER Drinks Lemon in 64 oz of water daily.       UBIDECARENONE (ULTRA COQ10 PO) Take 200 mg by mouth.  conjugated estrogens (PREMARIN) 0.625 mg/gram vaginal cream Apply 0.5 g to affected area daily. 30 g 0    B.infantis-B.ani-B.long-B.bifi (PROBIOTIC 4X) 10-15 mg TbEC Take  by mouth.  cinnamon bark (CINNAMON) 500 mg cap TID 90 Cap 3    DOCOSAHEXANOIC ACID/EPA (FISH OIL PO) Take 1,200 mg by mouth daily. Twice daily      aspirin delayed-release 81 mg tablet Take  by mouth daily.  cholecalciferol (VITAMIN D3) 1,000 unit cap Take 2,000 Units by mouth daily.  inulin (FIBER GUMMIES PO) Take  by mouth.  guaiFENesin ER (MUCINEX) 600 mg ER tablet Take 1 Tab by mouth two (2) times a day. (Patient not taking: Reported on 12/12/2018) 60 Tab 0    OTHER Take Vinegar with Mother in 8 oz of water 30 mins before each meal Monday- Friday.  SOUR CHERRY EXTRACT (TART CHERRY EXTRACT PO) Take  by mouth.  Blood-Glucose Meter monitoring kit Please check blood sugar once daily. E11.9 Please provide with a freestyle product. 1 Kit 0    glucose blood VI test strips (ASCENSIA AUTODISC VI, ONE TOUCH ULTRA TEST VI) strip Please check blood sugar once daily. E11.9 Please provide with a freestyle product. 100 Strip 11    Lancets misc Please check blood sugar once daily. E11.9 Please provide with a freestyle product.  80 Each 6     Family History   Problem Relation Age of Onset    Hypertension Mother     Diabetes Father     Breast Cancer Sister 62     Social History     Tobacco Use    Smoking status: Former Smoker    Smokeless tobacco: Never Used   Substance Use Topics    Alcohol use: No           ROS  A comprehensive review of system was obtained and negative except findings in the HPI    Visit Vitals  /78 (BP 1 [facility]: Left arm, BP Patient Position: Sitting)   Pulse 75   Temp 98.9 °F (37.2 °C) (Oral)   Resp 14   Ht 5' 4\" (1.626 m)   Wt 185 lb (83.9 kg)   SpO2 96%   BMI 31.76 kg/m²     Physical Exam   Constitutional: She is oriented to person, place, and time. She appears well-developed and well-nourished. HENT:   Right Ear: External ear normal.   Left Ear: External ear normal.   Mouth/Throat: No oropharyngeal exudate. Neck: No JVD present. Cardiovascular: Normal rate, regular rhythm and intact distal pulses. Exam reveals no gallop and no friction rub. No murmur heard. Pulmonary/Chest: Effort normal and breath sounds normal. No respiratory distress. She has no wheezes. Musculoskeletal: She exhibits no edema. Neurological: She is alert and oriented to person, place, and time. Skin: Skin is warm. Right upper abd tick bite, area infected, no cellulitis or tracking   Nursing note and vitals reviewed. ASSESSMENT and PLAN  Encounter Diagnoses   Name Primary?  Infected insect bite of abdomen, initial encounter Yes    Elevated cholesterol     Hypercalcemia     Essential hypertension     Colon cancer screening      Orders Placed This Encounter    LIPID PANEL    CBC WITH AUTOMATED DIFF    METABOLIC PANEL, COMPREHENSIVE    OCCULT BLOOD IMMUNOASSAY,DIAGNOSTIC    mupirocin (BACTROBAN) 2 % ointment     Labs updated  Ears are clear  Given bactroban for tick bite bid x 7 days  FIT kit given    I have discussed the diagnosis with the patient and the intended plan as seen in the above orders. The patient has received an after-visit summary and questions were answered concerning future plans. Patient conveyed understanding of the plan at the time of the visit.     Iraida Cagle, MSN, ANP  5/22/2019

## 2019-05-22 NOTE — PROGRESS NOTES
Chief Complaint   Patient presents with    Labs     Pt in office today for labs  -pt has concerns with 2 placed on her body that she has been itching  -pt states she thought it may be a tick but she isnt sure  -pt states that she itches at the base of her head and under right breast  Pt went to urgent care as she was not feeling well and feeling dizzy  -pt states they saw fluid in the right ear and that she should start taking allergy meds  -pt states she tried allegra and claratin and those did not work  -pt started zyrtec and states she feels it is helping    Pt has no other concerns

## 2019-05-23 LAB
ALBUMIN SERPL-MCNC: 4.7 G/DL (ref 3.6–4.8)
ALBUMIN/GLOB SERPL: 1.9 {RATIO} (ref 1.2–2.2)
ALP SERPL-CCNC: 63 IU/L (ref 39–117)
ALT SERPL-CCNC: 22 IU/L (ref 0–32)
AST SERPL-CCNC: 29 IU/L (ref 0–40)
BASOPHILS # BLD AUTO: 0 X10E3/UL (ref 0–0.2)
BASOPHILS NFR BLD AUTO: 0 %
BILIRUB SERPL-MCNC: 0.4 MG/DL (ref 0–1.2)
BUN SERPL-MCNC: 24 MG/DL (ref 8–27)
BUN/CREAT SERPL: 24 (ref 12–28)
CALCIUM SERPL-MCNC: 10.4 MG/DL (ref 8.7–10.3)
CHLORIDE SERPL-SCNC: 102 MMOL/L (ref 96–106)
CHOLEST SERPL-MCNC: 162 MG/DL (ref 100–199)
CO2 SERPL-SCNC: 23 MMOL/L (ref 20–29)
CREAT SERPL-MCNC: 0.99 MG/DL (ref 0.57–1)
EOSINOPHIL # BLD AUTO: 0.1 X10E3/UL (ref 0–0.4)
EOSINOPHIL NFR BLD AUTO: 1 %
ERYTHROCYTE [DISTWIDTH] IN BLOOD BY AUTOMATED COUNT: 15.7 % (ref 12.3–15.4)
GLOBULIN SER CALC-MCNC: 2.5 G/DL (ref 1.5–4.5)
GLUCOSE SERPL-MCNC: 99 MG/DL (ref 65–99)
HCT VFR BLD AUTO: 41.5 % (ref 34–46.6)
HDLC SERPL-MCNC: 66 MG/DL
HGB BLD-MCNC: 13.8 G/DL (ref 11.1–15.9)
IMM GRANULOCYTES # BLD AUTO: 0 X10E3/UL (ref 0–0.1)
IMM GRANULOCYTES NFR BLD AUTO: 0 %
INTERPRETATION, 910389: NORMAL
INTERPRETATION: NORMAL
LDLC SERPL CALC-MCNC: 80 MG/DL (ref 0–99)
LYMPHOCYTES # BLD AUTO: 2.7 X10E3/UL (ref 0.7–3.1)
LYMPHOCYTES NFR BLD AUTO: 35 %
MCH RBC QN AUTO: 28.9 PG (ref 26.6–33)
MCHC RBC AUTO-ENTMCNC: 33.3 G/DL (ref 31.5–35.7)
MCV RBC AUTO: 87 FL (ref 79–97)
MONOCYTES # BLD AUTO: 0.5 X10E3/UL (ref 0.1–0.9)
MONOCYTES NFR BLD AUTO: 6 %
NEUTROPHILS # BLD AUTO: 4.4 X10E3/UL (ref 1.4–7)
NEUTROPHILS NFR BLD AUTO: 58 %
PDF IMAGE, 910387: NORMAL
PLATELET # BLD AUTO: 299 X10E3/UL (ref 150–450)
POTASSIUM SERPL-SCNC: 4.3 MMOL/L (ref 3.5–5.2)
PROT SERPL-MCNC: 7.2 G/DL (ref 6–8.5)
RBC # BLD AUTO: 4.78 X10E6/UL (ref 3.77–5.28)
SODIUM SERPL-SCNC: 141 MMOL/L (ref 134–144)
TRIGL SERPL-MCNC: 81 MG/DL (ref 0–149)
VLDLC SERPL CALC-MCNC: 16 MG/DL (ref 5–40)
WBC # BLD AUTO: 7.7 X10E3/UL (ref 3.4–10.8)

## 2019-07-17 LAB — HEMOCCULT STL QL IA: NORMAL

## 2019-09-04 ENCOUNTER — OFFICE VISIT (OUTPATIENT)
Dept: FAMILY MEDICINE CLINIC | Age: 69
End: 2019-09-04

## 2019-09-04 VITALS
TEMPERATURE: 98.6 F | BODY MASS INDEX: 31.07 KG/M2 | HEART RATE: 71 BPM | DIASTOLIC BLOOD PRESSURE: 80 MMHG | SYSTOLIC BLOOD PRESSURE: 127 MMHG | WEIGHT: 182 LBS | HEIGHT: 64 IN | RESPIRATION RATE: 16 BRPM | OXYGEN SATURATION: 98 %

## 2019-09-04 DIAGNOSIS — D32.9 MENINGIOMA (HCC): Primary | ICD-10-CM

## 2019-09-04 DIAGNOSIS — R42 VERTIGO: ICD-10-CM

## 2019-09-04 NOTE — PROGRESS NOTES
Chief Complaint   Patient presents with    Follow-up     [facility] ED     Patient presents in office today for ED f/u from [facility].  Seen in June for vertigo. No concerns. 1. Have you been to the ER, urgent care clinic since your last visit? Hospitalized since your last visit? Yes When: 6/3/19 Where: [facility]  Reason for visit: vertigo    2. Have you seen or consulted any other health care providers outside of the [address] [place] [address] since your last visit? Include any pap smears or colon screening.  Yes 6/2019 Dr. Kimberly Mcgowan ENT     Learning Assessment 3/15/2018   PRIMARY LEARNER Patient   HIGHEST LEVEL OF EDUCATION - PRIMARY LEARNER  GRADUATED HIGH SCHOOL OR GED   BARRIERS PRIMARY LEARNER NONE   CO-LEARNER CAREGIVER No   PRIMARY LANGUAGE ENGLISH   LEARNER PREFERENCE PRIMARY DEMONSTRATION   LEARNING SPECIAL TOPICS no   ANSWERED BY pt   RELATIONSHIP SELF

## 2019-09-04 NOTE — PATIENT INSTRUCTIONS
Epley Maneuver at Home for Vertigo: Exercises  Introduction  Vertigo is a spinning or whirling sensation when you move your head. Your doctor may have moved you in different positions to help your vertigo get better faster. This is called the Epley maneuver. Your doctor also may have asked you to do these exercises at home. Do the exercises as often as your doctor recommends. If your vertigo is getting worse, your doctor may have you change the exercise or stop it. Step 1  Step 1    1. Sit on the edge of a bed or sofa. Step 2    1. Turn your head 45 degrees in the direction your doctor told you to. This should be toward the ear that causes the most vertigo for you. In this picture, the woman is turning toward her left ear. Step 3    1. Tilt yourself backward until you are lying on your back. Your head should still be at a 45-degree turn. Your head should be about midway between looking straight ahead and looking out to your side. Hold for 30 seconds. If you have vertigo, stay in this position until it stops. Step 4    1. Turn your head 90 degrees toward the ear that has the least vertigo. In this picture, the woman is turning to the right because she has vertigo on her left side. The point of your chin should be raised and over your shoulder. Hold for 30 seconds. Step 5    1. Roll onto the side with the least vertigo. You should now be looking at the floor. Hold for 30 seconds. Follow-up care is a key part of your treatment and safety. Be sure to make and go to all appointments, and call your doctor if you are having problems. It's also a good idea to know your test results and keep a list of the medicines you take. Where can you learn more? Go to http://dana-josi.info/. Enter [id number] in the search box to learn more about \"Epley Maneuver at Home for Vertigo: Exercises. \"  Current as of: March 28, 2019  Content Version: 12.1  © 6603-1926 Healthwise, [facility].  Care instructions adapted under license by [employer] (which disclaims liability or warranty for this information). If you have questions about a medical condition or this instruction, always ask your healthcare professional. [address] any warranty or liability for your use of this information.

## 2019-09-04 NOTE — PROGRESS NOTES
Perri Hurst Mass is a 77 y.o. female   Chief Complaint   Patient presents with    Follow-up     [facility] ED    Pt here for follow up from ED visit in June for vertigo and the vertigo has completely resolved. Pt son has meniere's disease. Pt does have antivert and zofran for if she has another  Pt does not have symptoms of meniere's. Pt did have a small meningioma on her CT seen 6x6x7 cm. She has no symptoms of this and we did go over imaging for this MRI w vs nothing. Pt would like to wait on imaging and I am in agreement with this. Would rescan in 2 years with MRI w/ contrast.       she is a 77y.o. year old female who presents for evalution. Reviewed PmHx, RxHx, FmHx, SocHx, AllgHx and updated and dated in the chart. Review of Systems - negative except as listed above in the HPI    Objective:     Vitals:    09/04/19 0709   BP: 127/80   Pulse: 71   Resp: 16   Temp: 98.6 °F (37 °C)   TempSrc: Oral   SpO2: 98%   Weight: 182 lb (82.6 kg)   Height: 5' 4\" (1.626 m)       Current Outpatient Medications   Medication Sig    mupirocin (BACTROBAN) 2 % ointment Apply  to affected area two (2) times a day.  inulin (FIBER GUMMIES PO) Take  by mouth.  bisoprolol-hydroCHLOROthiazide (ZIAC) 5-6.25 mg per tablet TAKE ONE TABLET BY MOUTH ONCE DAILY    fenofibrate micronized (LOFIBRA) 134 mg capsule TAKE ONE CAPSULE BY MOUTH IN THE MORNING    atorvastatin (LIPITOR) 40 mg tablet TAKE ONE TABLET BY MOUTH ONCE DAILY (Patient taking differently: Take 1/4 tab taken every other day)    D-MANNOSE PO Take  by mouth. Takes 9 caps daily    OTHER Take Vinegar with Mother in 8 oz of water 30 mins before each meal Monday- Friday.  OTHER Drinks Lemon in 64 oz of water daily.  UBIDECARENONE (ULTRA COQ10 PO) Take 200 mg by mouth.  SOUR CHERRY EXTRACT (TART CHERRY EXTRACT PO) Take  by mouth.  conjugated estrogens (PREMARIN) 0.625 mg/gram vaginal cream Apply 0.5 g to affected area daily.     B.infantis-B.ani-B.long-B.bifi (PROBIOTIC 4X) 10-15 mg TbEC Take  by mouth.  cinnamon bark (CINNAMON) 500 mg cap TID    Blood-Glucose Meter monitoring kit Please check blood sugar once daily. E11.9 Please provide with a freestyle product.  glucose blood VI test strips (ASCENSIA AUTODISC VI, ONE TOUCH ULTRA TEST VI) strip Please check blood sugar once daily. E11.9 Please provide with a freestyle product.  Lancets misc Please check blood sugar once daily. E11.9 Please provide with a freestyle product.  DOCOSAHEXANOIC ACID/EPA (FISH OIL PO) Take 1,200 mg by mouth daily. Twice daily    aspirin delayed-release 81 mg tablet Take  by mouth daily.  cholecalciferol (VITAMIN D3) 1,000 unit cap Take 2,000 Units by mouth daily.  guaiFENesin ER (MUCINEX) 600 mg ER tablet Take 1 Tab by mouth two (2) times a day. (Patient not taking: Reported on 12/12/2018)     No current facility-administered medications for this visit. Physical Examination: General appearance - alert, well appearing, and in no distress  Mental status - alert, oriented to person, place, and time  Eyes - pupils equal and reactive, extraocular eye movements intact  Ears - bilateral TM's and external ear canals normal  Mouth - mucous membranes moist, pharynx normal without lesions  Neck - supple, no significant adenopathy  Chest - clear to auscultation, no wheezes, rales or rhonchi, symmetric air entry  Heart - normal rate, regular rhythm, normal S1, S2, no murmurs, rubs, clicks or gallops  Neurological - alert, oriented, normal speech, no focal findings or movement disorder noted      Assessment/ Plan:   Diagnoses and all orders for this visit:    1. Meningioma ([address]  Follow up scan 2 years sooner if symptoms come on  2. Vertigo  Given Epley maneuver if recurrence and discussed when it would be appropriate for ED eval     Follow-up and Dispositions    · Return if symptoms worsen or fail to improve.            I have discussed the diagnosis with the patient and the intended plan as seen in the above orders. The patient has received an after-visit summary and questions were answered concerning future plans. Pt conveyed understanding of plan. Medication Side Effects and Warnings were discussed with patient      Madelin Oniel Ojeda DO   Over 50% of the 25 minutes face to face with Perri Valdivia consisted of counseling and/or discussing treatment plans in reference to her vertigo and meningioma.

## 2019-10-04 RX ORDER — BISOPROLOL FUMARATE AND HYDROCHLOROTHIAZIDE 5; 6.25 MG/1; MG/1
TABLET ORAL
Qty: 90 TAB | Refills: 2 | Status: SHIPPED | OUTPATIENT
Start: 2019-10-04 | End: 2020-06-15 | Stop reason: SDUPTHER

## 2019-10-04 RX ORDER — ATORVASTATIN CALCIUM 40 MG/1
TABLET, FILM COATED ORAL
Qty: 90 TAB | Refills: 2 | Status: SHIPPED | OUTPATIENT
Start: 2019-10-04 | End: 2020-06-18 | Stop reason: SDUPTHER

## 2019-10-04 RX ORDER — FENOFIBRATE 134 MG/1
CAPSULE ORAL
Qty: 90 CAP | Refills: 2 | Status: SHIPPED | OUTPATIENT
Start: 2019-10-04 | End: 2020-06-10

## 2019-12-19 ENCOUNTER — OFFICE VISIT (OUTPATIENT)
Dept: FAMILY MEDICINE CLINIC | Age: 69
End: 2019-12-19

## 2019-12-19 ENCOUNTER — HOSPITAL ENCOUNTER (OUTPATIENT)
Dept: LAB | Age: 69
Discharge: HOME | End: 2019-12-19

## 2019-12-19 VITALS
RESPIRATION RATE: 18 BRPM | TEMPERATURE: 98.5 F | HEART RATE: 66 BPM | WEIGHT: 180 LBS | SYSTOLIC BLOOD PRESSURE: 121 MMHG | HEIGHT: 64 IN | DIASTOLIC BLOOD PRESSURE: 77 MMHG | OXYGEN SATURATION: 100 % | BODY MASS INDEX: 30.73 KG/M2

## 2019-12-19 DIAGNOSIS — Z85.6 HISTORY OF LEUKEMIA: ICD-10-CM

## 2019-12-19 DIAGNOSIS — E55.9 VITAMIN D DEFICIENCY: ICD-10-CM

## 2019-12-19 DIAGNOSIS — Z13.29 SCREENING FOR THYROID DISORDER: ICD-10-CM

## 2019-12-19 DIAGNOSIS — Z00.00 MEDICARE ANNUAL WELLNESS VISIT, SUBSEQUENT: Primary | ICD-10-CM

## 2019-12-19 DIAGNOSIS — E78.00 ELEVATED CHOLESTEROL: ICD-10-CM

## 2019-12-19 DIAGNOSIS — H81.10 BPV (BENIGN POSITIONAL VERTIGO), UNSPECIFIED LATERALITY: ICD-10-CM

## 2019-12-19 DIAGNOSIS — E83.52 HYPERCALCEMIA: ICD-10-CM

## 2019-12-19 DIAGNOSIS — R73.9 ELEVATED BLOOD SUGAR: ICD-10-CM

## 2019-12-19 DIAGNOSIS — M79.621 PAIN IN RIGHT AXILLA: ICD-10-CM

## 2019-12-19 RX ORDER — ONDANSETRON 4 MG/1
4 TABLET, ORALLY DISINTEGRATING ORAL
Qty: 30 TAB | Refills: 2 | Status: SHIPPED | OUTPATIENT
Start: 2019-12-19 | End: 2020-11-06

## 2019-12-19 RX ORDER — MECLIZINE HYDROCHLORIDE 25 MG/1
25 TABLET ORAL
Qty: 30 TAB | Refills: 2 | Status: SHIPPED | OUTPATIENT
Start: 2019-12-19 | End: 2019-12-29

## 2019-12-19 NOTE — PATIENT INSTRUCTIONS
Medicare Wellness Visit, Female The best way to live healthy is to have a lifestyle where you eat a well-balanced diet, exercise regularly, limit alcohol use, and quit all forms of tobacco/nicotine, if applicable. Regular preventive services are another way to keep healthy. Preventive services (vaccines, screening tests, monitoring & exams) can help personalize your care plan, which helps you manage your own care. Screening tests can find health problems at the earliest stages, when they are easiest to treat. [facility] follows the current, evidence-based guidelines published by the [facility] ([facility]) when recommending preventive services for our patients. Because we follow these guidelines, sometimes recommendations change over time as research supports it. (For example, mammograms used to be recommended annually. Even though Medicare will still pay for an annual mammogram, the newer guidelines recommend a mammogram every two years for women of average risk). Of course, you and your doctor may decide to screen more often for some diseases, based on your risk and your co-morbidities (chronic disease you are already diagnosed with). Preventive services for you include: - Medicare offers their members a free annual wellness visit, which is time for you and your primary care provider to discuss and plan for your preventive service needs. Take advantage of this benefit every year! 
-All adults over the age of 72 should receive the recommended pneumonia vaccines. Current USPSTF guidelines recommend a series of two vaccines for the best pneumonia protection.  
-All adults should have a flu vaccine yearly and a tetanus vaccine every 10 years.  
-All adults age 48 and older should receive the shingles vaccines (series of two vaccines). -All adults age 38-68 who are overweight should have a diabetes screening test once every three years. -All adults born between [birth year] and 1964 should be screened once for Hepatitis C. 
-Other screening tests and preventive services for persons with diabetes include: an eye exam to screen for diabetic retinopathy, a kidney function test, a foot exam, and stricter control over your cholesterol.  
-Cardiovascular screening for adults with routine risk involves an electrocardiogram (ECG) at intervals determined by your doctor.  
-Colorectal cancer screenings should be done for adults age 54-65 with no increased risk factors for colorectal cancer. There are a number of acceptable methods of screening for this type of cancer. Each test has its own benefits and drawbacks. Discuss with your doctor what is most appropriate for you during your annual wellness visit. The different tests include: colonoscopy (considered the best screening method), a fecal occult blood test, a fecal DNA test, and sigmoidoscopy. 
 
-A bone mass density test is recommended when a woman turns 65 to screen for osteoporosis. This test is only recommended one time, as a screening. Some providers will use this same test as a disease monitoring tool if you already have osteoporosis. -Breast cancer screenings are recommended every other year for women of normal risk, age 54-69. 
-Cervical cancer screenings for women over age 72 are only recommended with certain risk factors. Here is a list of your current Health Maintenance items (your personalized list of preventive services) with a due date: 
Health Maintenance Due Topic Date Due  
 Flu Vaccine  08/01/2019 August Annual Well Visit  12/13/2019

## 2019-12-19 NOTE — PROGRESS NOTES
Chief Complaint   Patient presents with   Pop Goldsmith     Patient in office today for f/u and cpe and fasting labs. Pt received flu shot @ [facility]. Have c/o of soreness in right armpit that was noted 2 months. Pt states prior to soreness extended rt arm really hard. Pt have not treated with otc. Pt would like to discuss vertigo that occurred x2 this yr. Pt states vertigo was noted first in April and in June. Pt states prior to vertigo episode had intake of shrimp. Pt states she did see ENT specialist,exam was normal;pt was prescribed meclizine and zofran. Pt states have noted sporadic lightheadedness. Would like to discuss is provider can prescribed meclizine and zofran for prn usage. Pt states son does have Menière disease. 1. Have you been to the ER, urgent care clinic since your last visit? Hospitalized since your last visit? No    2. Have you seen or consulted any other health care providers outside of the [address] [place] [address] since your last visit? Include any pap smears or colon screening.  No

## 2019-12-19 NOTE — PROGRESS NOTES
Chief Complaint   Patient presents with   Pop Goldsmith     Patient in office today for f/u and cpe and fasting labs. Pt received flu shot @ [facility]. Have c/o of soreness in right armpit that was noted 2 months. Pt states prior to soreness extended rt arm really hard. Pain was constant initially but comes and goes now. Using her arm throughout the day. Wondering if she pulled something. Can have multiple days where the pain does not bother her but when she reaches a lot, can aggravate more. Pt have not treated with otc. Has mammogram scheduled for January 11th. Pt would like to discuss vertigo that occurred x2 this yr. Pt states vertigo was noted first in April and in June. Pt states prior to vertigo episode had intake of shrimp. Pt states she did see ENT specialist, exam was normal; pt was prescribed meclizine and zofran. Pt states have noted sporadic lightheadedness. Would like to discuss is provider can prescribed meclizine and zofran for prn usage. Pt states son does have Menière disease. Pt denies any associated tinnitus. ENT specialist recommended she avoid eating any more shrimp. Poor historian regarding whether the zofran or meclizine helped her. Requesting to have these medications refilled for PRN use. This is the Subsequent Medicare Annual Wellness Exam, performed 12 months or more after the Initial AWV or the last Subsequent AWV    I have reviewed the patient's medical history in detail and updated the computerized patient record.      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 [address]       Past Surgical History:   Procedure Laterality Date    HX BONE MARROW TRANSPLANT      HX BUNIONECTOMY      HX CHOLECYSTECTOMY      HX ORTHOPAEDIC       Current Outpatient Medications   Medication Sig Dispense Refill    fenofibrate micronized (LOFIBRA) 134 mg capsule TAKE 1 CAPSULE BY MOUTH IN THE MORNING 90 Cap 2    bisoprolol-hydroCHLOROthiazide (ZIAC) 5-6.25 mg per tablet TAKE 1 TABLET BY MOUTH ONCE DAILY 90 Tab 2    atorvastatin (LIPITOR) 40 mg tablet TAKE 1 TABLET BY MOUTH ONCE DAILY (Patient taking differently: Take 1/4 tab every other day.) 90 Tab 2    D-MANNOSE PO Take  by mouth. Takes 9 caps daily      OTHER Drinks Lemon in 64 oz of water daily.  UBIDECARENONE (ULTRA COQ10 PO) Take 200 mg by mouth.  conjugated estrogens (PREMARIN) 0.625 mg/gram vaginal cream Apply 0.5 g to affected area daily. 30 g 0    B.infantis-B.ani-B.long-B.bifi (PROBIOTIC 4X) 10-15 mg TbEC Take  by mouth.  cinnamon bark (CINNAMON) 500 mg cap TID 90 Cap 3    Blood-Glucose Meter monitoring kit Please check blood sugar once daily. E11.9 Please provide with a freestyle product. 1 Kit 0    glucose blood VI test strips (ASCENSIA AUTODISC VI, ONE TOUCH ULTRA TEST VI) strip Please check blood sugar once daily. E11.9 Please provide with a freestyle product. 100 Strip 11    Lancets misc Please check blood sugar once daily. E11.9 Please provide with a freestyle product. 100 Each 11    DOCOSAHEXANOIC ACID/EPA (FISH OIL PO) Take 1,200 mg by mouth daily. Twice daily      aspirin delayed-release 81 mg tablet Take  by mouth daily.  cholecalciferol (VITAMIN D3) 1,000 unit cap Take 2,000 Units by mouth daily.        Allergies   Allergen Reactions    Codeine Nausea and Vomiting    Erythromycin Nausea and Vomiting    Macrobid [Nitrofurantoin Monohyd/M-Cryst] Diarrhea and Nausea and Vomiting       Family History   Problem Relation Age of Onset    Hypertension Mother     Diabetes Father     Breast Cancer Sister 62     Social History     Tobacco Use    Smoking status: Former Smoker    Smokeless tobacco: Never Used   Substance Use Topics    Alcohol use: No       Depression Risk Factor Screening:     3 most recent PHQ Screens 12/19/2019   Little interest or pleasure in doing things Not at all   Feeling down, depressed, irritable, or hopeless Not at all   Total Score PHQ 2 0       Alcohol Risk Factor Screening:   Do you average 1 drink per night or more than 7 drinks a week:  No    On any one occasion in the past three months have you have had more than 3 drinks containing alcohol:  No      Functional Ability and Level of Safety:   Hearing: Hearing is good. Activities of Daily Living: The home contains: no safety equipment. Patient does total self care    Ambulation: with no difficulty    Fall Risk:  Fall Risk Assessment, last 12 mths 12/19/2019   Able to walk? Yes   Fall in past 12 months?  No       Abuse Screen:  Patient is not abused    Cognitive Screening   Has your family/caregiver stated any concerns about your memory: no  Cognitive Screening: Normal - MMSE (Mini Mental Status Exam)    Patient Care Team   Patient Care Team:  aTnner Grey MD as PCP - General (Family Practice)  Zuleyka Paul MD as PCP - [facility] Empaneled Provider     Objective:     Vitals:    12/19/19 0803   BP: 121/77   Pulse: 66   Resp: 18   Temp: 98.5 °F (36.9 °C)   TempSrc: Oral   SpO2: 100%   Weight: 180 lb (81.6 kg)   Height: 5' 4\" (1.626 m)     General appearance - alert, well appearing, and in no distress  Mental status - alert, oriented to person, place, and time, normal mood, behavior, speech, dress, motor activity, and thought processes  Eyes - pupils equal and reactive, extraocular eye movements intact  Ears - bilateral TM's and external ear canals normal  Nose - normal and patent, no erythema, discharge or polyps and normal nontender sinuses  Mouth - mucous membranes moist, pharynx normal without lesions  Discrete palpable nodule in the right submandibular region that is approx pea sized, denies any pain, reports this has been present for many years and has not changed  Neck - supple, no significant adenopathy, carotids upstroke normal bilaterally, no bruits, thyroid exam: thyroid is normal in size without nodules or tenderness  Chest - clear to auscultation, no wheezes, rales or rhonchi, symmetric air entry  Heart - normal rate, regular rhythm, normal S1, S2, no murmurs  Abdomen - soft, nontender, nondistended, no masses or organomegaly  bowel sounds normal  Neurological - DTR's normal and symmetric, motor and sensory grossly normal bilaterally, normal muscle tone, no tremors, strength 5/5  Musculoskeletal - no joint tenderness, deformity or swelling, full range of motion without pain  Extremities - peripheral pulses normal, no edema, no clubbing or cyanosis  Skin - normal coloration and turgor, no rashes, no suspicious skin lesions noted    Assessment/Plan   Education and counseling provided:  Are appropriate based on today's review and evaluation    Diagnoses and all orders for this visit:    1. Medicare annual wellness visit, subsequent    2. Pain in right axilla  Discussed that she likely pulled a muscle based on what she was doing prior to pain starting. Unlikely she tore anything. Enc supportive measures and to follow up if sx persist or worsen. 3. BPV (benign positional vertigo), unspecified laterality  -     meclizine (ANTIVERT) 25 mg tablet; Take 1 Tab by mouth three (3) times daily as needed for Dizziness or Nausea for up to 10 days. -     ondansetron (ZOFRAN ODT) 4 mg disintegrating tablet; Take 1 Tab by mouth every eight (8) hours as needed for Nausea. Refilled meds so pt can have on hand to take as needed for acute flare ups. 4. Elevated cholesterol  -     LIPID PANEL; Future  Will notify results and deviate plan based on findings. 5. Elevated blood sugar  -     HEMOGLOBIN A1C WITH EAG; Future  Will notify results and deviate plan based on findings. Enc low carb diet.    6. Vitamin D deficiency  -     VITAMIN D, 25 HYDROXY; Future  Will notify results and deviate plan based on findings. 7. Hypercalcemia  -     METABOLIC PANEL, COMPREHENSIVE; Future  Will notify results and deviate plan based on findings. 8. History of leukemia  -     CBC WITH AUTOMATED DIFF; Future  In remission. 9. Screening for thyroid disorder  -     TSH 3RD GENERATION; Future  Screening, asx. There are no preventive care reminders to display for this patient.   Dana Chowdhury, ALEKSANDERP-C

## 2019-12-27 LAB
25(OH)D3+25(OH)D2 SERPL-MCNC: 29.3 NG/ML (ref 30–100)
ALBUMIN SERPL-MCNC: 4.3 G/DL (ref 3.6–4.8)
ALBUMIN/GLOB SERPL: 1.7 {RATIO} (ref 1.2–2.2)
ALP SERPL-CCNC: 74 IU/L (ref 39–117)
ALT SERPL-CCNC: 16 IU/L (ref 0–32)
AST SERPL-CCNC: 23 IU/L (ref 0–40)
BASOPHILS # BLD AUTO: 0.1 X10E3/UL (ref 0–0.2)
BASOPHILS NFR BLD AUTO: 1 %
BILIRUB SERPL-MCNC: 0.3 MG/DL (ref 0–1.2)
BUN SERPL-MCNC: 18 MG/DL (ref 8–27)
BUN/CREAT SERPL: 19 (ref 12–28)
CALCIUM SERPL-MCNC: 10.6 MG/DL (ref 8.7–10.3)
CHLORIDE SERPL-SCNC: 100 MMOL/L (ref 96–106)
CHOLEST SERPL-MCNC: 151 MG/DL (ref 100–199)
CO2 SERPL-SCNC: 24 MMOL/L (ref 20–29)
CREAT SERPL-MCNC: 0.94 MG/DL (ref 0.57–1)
EOSINOPHIL # BLD AUTO: 0.1 X10E3/UL (ref 0–0.4)
EOSINOPHIL NFR BLD AUTO: 1 %
ERYTHROCYTE [DISTWIDTH] IN BLOOD BY AUTOMATED COUNT: 14.6 % (ref 12.3–15.4)
GLOBULIN SER CALC-MCNC: 2.6 G/DL (ref 1.5–4.5)
GLUCOSE SERPL-MCNC: 94 MG/DL (ref 65–99)
HBA1C MFR BLD: 5.9 % (ref 4.8–5.6)
HCT VFR BLD AUTO: 39.5 % (ref 34–46.6)
HDLC SERPL-MCNC: 67 MG/DL
HGB BLD-MCNC: 12.8 G/DL (ref 11.1–15.9)
IMM GRANULOCYTES # BLD AUTO: 0 X10E3/UL (ref 0–0.1)
IMM GRANULOCYTES NFR BLD AUTO: 0 %
INTERPRETATION, 910389: NORMAL
LDLC SERPL CALC-MCNC: 69 MG/DL (ref 0–99)
LYMPHOCYTES # BLD AUTO: 2.9 X10E3/UL (ref 0.7–3.1)
LYMPHOCYTES NFR BLD AUTO: 35 %
MCH RBC QN AUTO: 27.4 PG (ref 26.6–33)
MCHC RBC AUTO-ENTMCNC: 32.4 G/DL (ref 31.5–35.7)
MCV RBC AUTO: 85 FL (ref 79–97)
MONOCYTES # BLD AUTO: 0.6 X10E3/UL (ref 0.1–0.9)
MONOCYTES NFR BLD AUTO: 7 %
NEUTROPHILS # BLD AUTO: 4.6 X10E3/UL (ref 1.4–7)
NEUTROPHILS NFR BLD AUTO: 56 %
PLATELET # BLD AUTO: 364 X10E3/UL (ref 150–450)
POTASSIUM SERPL-SCNC: 4.5 MMOL/L (ref 3.5–5.2)
PROT SERPL-MCNC: 6.9 G/DL (ref 6–8.5)
RBC # BLD AUTO: 4.67 X10E6/UL (ref 3.77–5.28)
SODIUM SERPL-SCNC: 134 MMOL/L (ref 134–144)
TRIGL SERPL-MCNC: 75 MG/DL (ref 0–149)
TSH SERPL DL<=0.005 MIU/L-ACNC: 1.34 UIU/ML (ref 0.45–4.5)
VLDLC SERPL CALC-MCNC: 15 MG/DL (ref 5–40)
WBC # BLD AUTO: 8.2 X10E3/UL (ref 3.4–10.8)

## 2019-12-27 NOTE — PROGRESS NOTES
The following message was sent to pt via [employer] portal in reference to lab results:    Good morning Ms. Lorelei Linda,     Attached are the results of your most recent lab work. I have the following recommendations:    1. Your CBC which looks at your white blood cells, red blood cells, and hemoglobin came back looking normal. No sign of infection or anemia. 2. Your metabolic panel which looks at your blood glucose, liver function, and kidney function looks perfect minus your calcium being a little high. Do any of the supplements your taking contain calcium? 3. Your cholesterol came back looking great so keep up the good work with diet and continue taking your Lipitor daily as prescribed. 4.  Your TSH which screens for thyroid disease came back normal. This means you do not have hyper or hypothyroidism. 5. Your vitamin D is 0.7 point away from being considered normal. I recommend you increase your dose of ergocalciferol to 5,000 units daily over the counter. 6. Your hemoglobin a1c shows that your prediabetes has worsened ever so slightly. I urge you to continue with your efforts to follow a low carb diet and try to be as active as possible. Lets keep an eye on this with routine blood work every 6 months. Lets recheck these labs in 6 months. Please do not hesitate to call me or schedule an appointment to be seen if you need anything else in the meantime :)  Please let me know if you have any questions or concerns regarding these results.    TOMMY Love

## 2020-01-11 ENCOUNTER — HOSPITAL ENCOUNTER (OUTPATIENT)
Dept: MAMMOGRAPHY | Age: 70
Discharge: HOME | End: 2020-01-11
Payer: MEDICARE

## 2020-01-11 DIAGNOSIS — Z12.31 VISIT FOR SCREENING MAMMOGRAM: ICD-10-CM

## 2020-01-11 PROCEDURE — 77067 SCR MAMMO BI INCL CAD: CPT

## 2020-06-15 RX ORDER — BISOPROLOL FUMARATE AND HYDROCHLOROTHIAZIDE 5; 6.25 MG/1; MG/1
TABLET ORAL
Qty: 90 TAB | Refills: 1 | Status: SHIPPED | OUTPATIENT
Start: 2020-06-15 | End: 2020-12-27

## 2020-06-18 RX ORDER — ATORVASTATIN CALCIUM 40 MG/1
TABLET, FILM COATED ORAL
Qty: 90 TAB | Refills: 1 | Status: SHIPPED | OUTPATIENT
Start: 2020-06-18 | End: 2020-12-27

## 2020-11-06 ENCOUNTER — OFFICE VISIT (OUTPATIENT)
Dept: FAMILY MEDICINE CLINIC | Age: 70
End: 2020-11-06
Payer: MEDICARE

## 2020-11-06 VITALS
DIASTOLIC BLOOD PRESSURE: 75 MMHG | HEART RATE: 71 BPM | HEIGHT: 64 IN | WEIGHT: 180 LBS | RESPIRATION RATE: 18 BRPM | SYSTOLIC BLOOD PRESSURE: 113 MMHG | OXYGEN SATURATION: 99 % | BODY MASS INDEX: 30.73 KG/M2 | TEMPERATURE: 98.1 F

## 2020-11-06 DIAGNOSIS — E55.9 VITAMIN D DEFICIENCY: ICD-10-CM

## 2020-11-06 DIAGNOSIS — E83.52 HYPERCALCEMIA: ICD-10-CM

## 2020-11-06 DIAGNOSIS — Z13.29 SCREENING FOR THYROID DISORDER: ICD-10-CM

## 2020-11-06 DIAGNOSIS — E78.00 ELEVATED CHOLESTEROL: Primary | ICD-10-CM

## 2020-11-06 DIAGNOSIS — Z12.11 SCREENING FOR MALIGNANT NEOPLASM OF COLON: ICD-10-CM

## 2020-11-06 DIAGNOSIS — R73.9 ELEVATED BLOOD SUGAR: ICD-10-CM

## 2020-11-06 PROCEDURE — 1090F PRES/ABSN URINE INCON ASSESS: CPT | Performed by: NURSE PRACTITIONER

## 2020-11-06 PROCEDURE — G8427 DOCREV CUR MEDS BY ELIG CLIN: HCPCS | Performed by: NURSE PRACTITIONER

## 2020-11-06 PROCEDURE — 99213 OFFICE O/P EST LOW 20 MIN: CPT | Performed by: NURSE PRACTITIONER

## 2020-11-06 PROCEDURE — G8399 PT W/DXA RESULTS DOCUMENT: HCPCS | Performed by: NURSE PRACTITIONER

## 2020-11-06 PROCEDURE — 1101F PT FALLS ASSESS-DOCD LE1/YR: CPT | Performed by: NURSE PRACTITIONER

## 2020-11-06 PROCEDURE — G8536 NO DOC ELDER MAL SCRN: HCPCS | Performed by: NURSE PRACTITIONER

## 2020-11-06 PROCEDURE — G8417 CALC BMI ABV UP PARAM F/U: HCPCS | Performed by: NURSE PRACTITIONER

## 2020-11-06 PROCEDURE — 3017F COLORECTAL CA SCREEN DOC REV: CPT | Performed by: NURSE PRACTITIONER

## 2020-11-06 PROCEDURE — G9899 SCRN MAM PERF RSLTS DOC: HCPCS | Performed by: NURSE PRACTITIONER

## 2020-11-06 PROCEDURE — G8432 DEP SCR NOT DOC, RNG: HCPCS | Performed by: NURSE PRACTITIONER

## 2020-11-06 RX ORDER — LANOLIN ALCOHOL/MO/W.PET/CERES
CREAM (GRAM) TOPICAL
COMMUNITY

## 2020-11-06 NOTE — PROGRESS NOTES
Chief Complaint   Patient presents with    Cholesterol Problem    Blood sugar problem    Labs     Patient in office today for f/u and fasting labs. Health Maintenance Due   Topic Date Due    Colorectal Cancer Screening Combo  05/22/2020    GLAUCOMA SCREENING Q2Y  06/12/2020    Flu Vaccine (1) 09/01/2020     Pt did not follow up sooner due to Daniela. Pt is hopeful that lab work will look good. Taking her medications as prescribed. Drinking a lot of lemon water and lots of vitamin C and zinc.   Has also been taking melatonin before bedtime for COVID protection. Taking 1 hour before bed. Also liquid magnesium. Denies cp, sob, and dyspnea. Denies ha and dizziness. Denies n/v/c/d. Denies any urinary sx. Denies any other concerns at this time. Chief Complaint   Patient presents with    Cholesterol Problem    Blood sugar problem    Labs     she is a 72y.o. year old female who presents for evalution. Reviewed PmHx, RxHx, FmHx, SocHx, AllgHx and updated and dated in the chart.     Review of Systems - negative except as listed above in the HPI    Objective:     Vitals:    11/06/20 0744   BP: 113/75   Pulse: 71   Resp: 18   Temp: 98.1 °F (36.7 °C)   TempSrc: Oral   SpO2: 99%   Weight: 180 lb (81.6 kg)   Height: 5' 4\" (1.626 m)     Physical Examination: General appearance - alert, well appearing, and in no distress  Mental status - normal mood, behavior, speech, dress, motor activity, and thought processes  Eyes - pupils equal and reactive, extraocular eye movements intact  Ears - bilateral TM's and external ear canals normal  Nose - normal and patent, no erythema, discharge or polyps and normal nontender sinuses  Mouth - mucous membranes moist, pharynx normal without lesions  Neck - supple, chronic enlarged lymph node right anterior cervical chain, carotids upstroke normal bilaterally, no bruits, thyroid exam: thyroid is normal in size without nodules or tenderness  Chest - clear to auscultation, no wheezes, rales or rhonchi, symmetric air entry  Heart - normal rate, regular rhythm, normal S1, S2, no murmurs  Extremities - peripheral pulses normal, no ankle edema, chronic spider veins, no clubbing or cyanosis  Skin - normal coloration and turgor, no rashes, no suspicious skin lesions noted    Assessment/ Plan:   Diagnoses and all orders for this visit:    1. Elevated cholesterol  -     LIPID PANEL; Future  Will notify results and deviate plan based on findings. 2. Elevated blood sugar  -     CBC WITH AUTOMATED DIFF; Future  -     HEMOGLOBIN A1C WITH EAG; Future    3. Vitamin D deficiency  -     VITAMIN D, 25 HYDROXY; Future  Last check was normal.   4. Hypercalcemia  -     METABOLIC PANEL, COMPREHENSIVE; Future  Will notify results and deviate plan based on findings. 5. Screening for thyroid disorder  -     TSH 3RD GENERATION; Future  Screening, asx.   6. Screening for malignant neoplasm of colon  -     COLOGUARD TEST (FECAL DNA COLORECTAL CANCER SCREENING)  Enc pt to complete cologuard test to screen for colorectal cancer. I have discussed the diagnosis with the patient and the intended plan as seen in the above orders. The patient has received an after-visit summary and questions were answered concerning future plans. Medication Side Effects and Warnings were discussed with patient: yes  Patient Labs were reviewed and or requested: yes  Patient Past Records were reviewed and or requested  yes  Patient / Caregiver Understanding of treatment plan was verbalized during office visit YES    TOMMY Luo    There are no Patient Instructions on file for this visit.

## 2020-11-07 LAB
25(OH)D3+25(OH)D2 SERPL-MCNC: 30.1 NG/ML (ref 30–100)
ALBUMIN SERPL-MCNC: 4.4 G/DL (ref 3.8–4.8)
ALBUMIN/GLOB SERPL: 1.6 {RATIO} (ref 1.2–2.2)
ALP SERPL-CCNC: 70 IU/L (ref 39–117)
ALT SERPL-CCNC: 12 IU/L (ref 0–32)
AST SERPL-CCNC: 22 IU/L (ref 0–40)
BASOPHILS # BLD AUTO: 0.1 X10E3/UL (ref 0–0.2)
BASOPHILS NFR BLD AUTO: 1 %
BILIRUB SERPL-MCNC: 0.3 MG/DL (ref 0–1.2)
BUN SERPL-MCNC: 26 MG/DL (ref 8–27)
BUN/CREAT SERPL: 28 (ref 12–28)
CALCIUM SERPL-MCNC: 10.9 MG/DL (ref 8.7–10.3)
CHLORIDE SERPL-SCNC: 102 MMOL/L (ref 96–106)
CHOLEST SERPL-MCNC: 159 MG/DL (ref 100–199)
CO2 SERPL-SCNC: 22 MMOL/L (ref 20–29)
CREAT SERPL-MCNC: 0.93 MG/DL (ref 0.57–1)
EOSINOPHIL # BLD AUTO: 0.1 X10E3/UL (ref 0–0.4)
EOSINOPHIL NFR BLD AUTO: 1 %
ERYTHROCYTE [DISTWIDTH] IN BLOOD BY AUTOMATED COUNT: 14.4 % (ref 11.7–15.4)
EST. AVERAGE GLUCOSE BLD GHB EST-MCNC: 117 MG/DL
GLOBULIN SER CALC-MCNC: 2.7 G/DL (ref 1.5–4.5)
GLUCOSE SERPL-MCNC: 94 MG/DL (ref 65–99)
HBA1C MFR BLD: 5.7 % (ref 4.8–5.6)
HCT VFR BLD AUTO: 39.3 % (ref 34–46.6)
HDLC SERPL-MCNC: 65 MG/DL
HGB BLD-MCNC: 12.6 G/DL (ref 11.1–15.9)
IMM GRANULOCYTES # BLD AUTO: 0 X10E3/UL (ref 0–0.1)
IMM GRANULOCYTES NFR BLD AUTO: 0 %
INTERPRETATION, 910389: NORMAL
LDLC SERPL CALC-MCNC: 75 MG/DL (ref 0–99)
LYMPHOCYTES # BLD AUTO: 3.2 X10E3/UL (ref 0.7–3.1)
LYMPHOCYTES NFR BLD AUTO: 37 %
MCH RBC QN AUTO: 28.2 PG (ref 26.6–33)
MCHC RBC AUTO-ENTMCNC: 32.1 G/DL (ref 31.5–35.7)
MCV RBC AUTO: 88 FL (ref 79–97)
MONOCYTES # BLD AUTO: 0.5 X10E3/UL (ref 0.1–0.9)
MONOCYTES NFR BLD AUTO: 6 %
NEUTROPHILS # BLD AUTO: 4.7 X10E3/UL (ref 1.4–7)
NEUTROPHILS NFR BLD AUTO: 55 %
PLATELET # BLD AUTO: 354 X10E3/UL (ref 150–450)
POTASSIUM SERPL-SCNC: 4.7 MMOL/L (ref 3.5–5.2)
PROT SERPL-MCNC: 7.1 G/DL (ref 6–8.5)
RBC # BLD AUTO: 4.47 X10E6/UL (ref 3.77–5.28)
SODIUM SERPL-SCNC: 138 MMOL/L (ref 134–144)
TRIGL SERPL-MCNC: 108 MG/DL (ref 0–149)
TSH SERPL DL<=0.005 MIU/L-ACNC: 1.08 UIU/ML (ref 0.45–4.5)
VLDLC SERPL CALC-MCNC: 19 MG/DL (ref 5–40)
WBC # BLD AUTO: 8.6 X10E3/UL (ref 3.4–10.8)

## 2020-11-10 NOTE — PROGRESS NOTES
The following message was sent to pt via [employer] portal in reference to lab results:    Good morning Ms. Jeannie Langley,     Attached are the results of your most recent lab work. I have the following recommendations:    1. Your CBC which looks at your white blood cells, red blood cells, and hemoglobin came back looking normal. No sign of infection or anemia. 2. Your metabolic panel which looks at your blood glucose, liver function, and kidney function looks good minus the fact that your calcium is still elevated and is actually higher compared to last check. Are you able to come back to the office to check a parathyroid hormone level? We like to check this when the calcium is elevated. Another option is I can refer you to an endocrinologist for further evaluation into why this is elevated. 3. Your cholesterol came back looking great so keep up the good work with diet and keep taking your Lipitor daily as prescribed. .    4. Your TSH which screens for thyroid disease came back normal. This means you do not have hyper or hypothyroidism. 5. Your hemoglobin a1c looks great! You went from a 5.9 to a 5.7 which is excellent. Please continue with your efforts to follow a low carb diet. 6. Your vitamin D levels are on the low end of normal. Please continue taking your daily vitamin D supplement for ongoing maintenance. Please let me know if you have any questions or concerns regarding these results.    TOMMY Mackenzie

## 2020-11-12 DIAGNOSIS — E83.52 HYPERCALCEMIA: Primary | ICD-10-CM

## 2020-11-20 LAB — PTH-INTACT SERPL-MCNC: 15 PG/ML (ref 15–65)

## 2020-11-20 NOTE — PROGRESS NOTES
The following message was sent to pt via [employer] portal in reference to lab results:    Good morning Ms. Valdez Ritter,   Great news, your PTH is normal. We will continue to monitor your calcium levels with all future routine labs. No further work up is indicated at this time.    Kaitlin Blackmon, EDISONC

## 2020-12-11 ENCOUNTER — TELEPHONE (OUTPATIENT)
Dept: FAMILY MEDICINE CLINIC | Age: 70
End: 2020-12-11

## 2020-12-11 NOTE — TELEPHONE ENCOUNTER
Mimi/Mara would like to speak to nurse and see if patient has a stool kit. If not, she would send patient one.  Mimi's phone: [phone] ext [phone]

## 2020-12-14 ENCOUNTER — TRANSCRIBE ORDER (OUTPATIENT)
Dept: SCHEDULING | Age: 70
End: 2020-12-14

## 2020-12-14 DIAGNOSIS — Z12.31 SCREENING MAMMOGRAM FOR HIGH-RISK PATIENT: Primary | ICD-10-CM

## 2020-12-14 NOTE — TELEPHONE ENCOUNTER
Attempted to contact Mimi,office not open will retry later. colorguard was ordered for pt in 11/2020,but no results have been noted,pt did not submit sample.

## 2020-12-14 NOTE — TELEPHONE ENCOUNTER
[facility] stated they sent pt a stool kit to have completed,no further action is required by office at this time.

## 2021-05-03 ENCOUNTER — HOSPITAL ENCOUNTER (OUTPATIENT)
Dept: MAMMOGRAPHY | Age: 71
Discharge: HOME | End: 2021-05-03
Payer: MEDICARE

## 2021-05-03 DIAGNOSIS — Z12.31 SCREENING MAMMOGRAM FOR HIGH-RISK PATIENT: ICD-10-CM

## 2021-05-03 PROCEDURE — 77067 SCR MAMMO BI INCL CAD: CPT

## 2021-09-16 ENCOUNTER — TELEPHONE (OUTPATIENT)
Dept: FAMILY MEDICINE CLINIC | Age: 71
End: 2021-09-16

## 2021-09-16 RX ORDER — FLUCONAZOLE 150 MG/1
150 TABLET ORAL DAILY
Qty: 1 TABLET | Refills: 0 | Status: SHIPPED | OUTPATIENT
Start: 2021-09-16 | End: 2021-09-17

## 2021-09-16 NOTE — TELEPHONE ENCOUNTER
Pt is taking an Antibiotic got from eye and now has a yeast infection Eye Dr. Spivey Reusing not call anything in pt would like Diflucan please advise

## 2021-09-23 ENCOUNTER — TELEPHONE (OUTPATIENT)
Dept: FAMILY MEDICINE CLINIC | Age: 71
End: 2021-09-23

## 2021-09-23 NOTE — TELEPHONE ENCOUNTER
----- Message from Mae Michi sent at 9/23/2021  8:57 AM EDT -----  Subject: Referral Request    QUESTIONS   Reason for referral request? requesting yearly labs vitamin d and c   electrolytes magnesium and potassium   Has the physician seen you for this condition before? No   Preferred Specialist (if applicable)? Do you already have an appointment scheduled? No  Additional Information for Provider?   ---------------------------------------------------------------------------  --------------  CALL BACK INFO  What is the best way for the office to contact you? OK to leave message on   voicemail  Preferred Call Back Phone Number?  [phone]

## 2021-09-29 ENCOUNTER — TELEPHONE (OUTPATIENT)
Dept: FAMILY MEDICINE CLINIC | Age: 71
End: 2021-09-29

## 2021-09-29 NOTE — TELEPHONE ENCOUNTER
----- Message from Caryn Ireland sent at 9/29/2021 11:38 AM EDT -----  Subject: Message to Provider    QUESTIONS  Information for Provider? Pt req blood work order - cbc, H2Q, metabolic   bloodwork  ---------------------------------------------------------------------------  --------------  CALL BACK INFO  What is the best way for the office to contact you? OK to leave message on   voicemail  Preferred Call Back Phone Number? [phone]  ---------------------------------------------------------------------------  --------------  SCRIPT ANSWERS  Relationship to Patient?  Self

## 2021-09-30 NOTE — TELEPHONE ENCOUNTER
Pt states she wants labs completed prior to appt. Advised pt repeatedly from last telephone encounter that policy is for pt to have labs completed during or after appt in case any problems arise during appt,provider can address accordingly to and add additional labs  Pt was scheduled for cpe appt on 10/25-has been cancelled. Advised pt will give pt's demands to provider and provider can call pt to discuss wishes and preferred policy.

## 2021-10-04 ENCOUNTER — TELEPHONE (OUTPATIENT)
Dept: FAMILY MEDICINE CLINIC | Age: 71
End: 2021-10-04

## 2021-10-04 ENCOUNTER — PATIENT MESSAGE (OUTPATIENT)
Dept: FAMILY MEDICINE CLINIC | Age: 71
End: 2021-10-04

## 2021-10-04 NOTE — TELEPHONE ENCOUNTER
I have also sent a mychart message to pt explaining that we do not order labs prior to OV and that is our practice's policy.

## 2021-10-04 NOTE — TELEPHONE ENCOUNTER
Please refer to previous telephone encounter last week. Message was routed to practice manager for help with situation regarding pt's desires and practice policy.

## 2021-10-04 NOTE — TELEPHONE ENCOUNTER
----- Message from Jaleel Davis sent at 10/4/2021 12:46 PM EDT -----  Subject: Message to Provider    QUESTIONS  Information for Provider? Pt. called last week to get lab order and was   waiting for return call by end of week. Please contact as pt would like to   have bloodwork done within the next week or so.  ---------------------------------------------------------------------------  --------------  CALL BACK INFO  What is the best way for the office to contact you? OK to leave message on   voicemail, OK to respond with electronic message via [employer] portal (only   for patients who have registered [employer] account)  Preferred Call Back Phone Number? [phone]  ---------------------------------------------------------------------------  --------------  SCRIPT ANSWERS  Relationship to Patient?  Self

## 2021-10-25 ENCOUNTER — TELEPHONE (OUTPATIENT)
Dept: FAMILY MEDICINE CLINIC | Age: 71
End: 2021-10-25

## 2021-10-25 ENCOUNTER — OFFICE VISIT (OUTPATIENT)
Dept: FAMILY MEDICINE CLINIC | Age: 71
End: 2021-10-25
Payer: MEDICARE

## 2021-10-25 VITALS
DIASTOLIC BLOOD PRESSURE: 73 MMHG | RESPIRATION RATE: 18 BRPM | OXYGEN SATURATION: 98 % | SYSTOLIC BLOOD PRESSURE: 109 MMHG | HEIGHT: 64 IN | HEART RATE: 72 BPM | TEMPERATURE: 98.2 F | WEIGHT: 189 LBS | BODY MASS INDEX: 32.27 KG/M2

## 2021-10-25 DIAGNOSIS — E83.52 HYPERCALCEMIA: ICD-10-CM

## 2021-10-25 DIAGNOSIS — Z00.00 MEDICARE ANNUAL WELLNESS VISIT, SUBSEQUENT: Primary | ICD-10-CM

## 2021-10-25 DIAGNOSIS — D32.9 MENINGIOMA (HCC): ICD-10-CM

## 2021-10-25 DIAGNOSIS — H00.13 CHALAZION OF RIGHT EYE, UNSPECIFIED EYELID: ICD-10-CM

## 2021-10-25 DIAGNOSIS — R73.9 ELEVATED BLOOD SUGAR: ICD-10-CM

## 2021-10-25 DIAGNOSIS — E55.9 VITAMIN D DEFICIENCY: ICD-10-CM

## 2021-10-25 DIAGNOSIS — E78.00 ELEVATED CHOLESTEROL: ICD-10-CM

## 2021-10-25 DIAGNOSIS — Z94.81 BONE MARROW TRANSPLANT STATUS (HCC): ICD-10-CM

## 2021-10-25 PROCEDURE — G9899 SCRN MAM PERF RSLTS DOC: HCPCS | Performed by: NURSE PRACTITIONER

## 2021-10-25 PROCEDURE — 1090F PRES/ABSN URINE INCON ASSESS: CPT | Performed by: NURSE PRACTITIONER

## 2021-10-25 PROCEDURE — G8399 PT W/DXA RESULTS DOCUMENT: HCPCS | Performed by: NURSE PRACTITIONER

## 2021-10-25 PROCEDURE — G8432 DEP SCR NOT DOC, RNG: HCPCS | Performed by: NURSE PRACTITIONER

## 2021-10-25 PROCEDURE — 99214 OFFICE O/P EST MOD 30 MIN: CPT | Performed by: NURSE PRACTITIONER

## 2021-10-25 PROCEDURE — 3017F COLORECTAL CA SCREEN DOC REV: CPT | Performed by: NURSE PRACTITIONER

## 2021-10-25 PROCEDURE — G8427 DOCREV CUR MEDS BY ELIG CLIN: HCPCS | Performed by: NURSE PRACTITIONER

## 2021-10-25 PROCEDURE — G8536 NO DOC ELDER MAL SCRN: HCPCS | Performed by: NURSE PRACTITIONER

## 2021-10-25 PROCEDURE — G8417 CALC BMI ABV UP PARAM F/U: HCPCS | Performed by: NURSE PRACTITIONER

## 2021-10-25 PROCEDURE — 1101F PT FALLS ASSESS-DOCD LE1/YR: CPT | Performed by: NURSE PRACTITIONER

## 2021-10-25 PROCEDURE — G0439 PPPS, SUBSEQ VISIT: HCPCS | Performed by: NURSE PRACTITIONER

## 2021-10-25 NOTE — PATIENT INSTRUCTIONS
Medicare Wellness Visit, Female     The best way to live healthy is to have a lifestyle where you eat a well-balanced diet, exercise regularly, limit alcohol use, and quit all forms of tobacco/nicotine, if applicable. Regular preventive services are another way to keep healthy. Preventive services (vaccines, screening tests, monitoring & exams) can help personalize your care plan, which helps you manage your own care. Screening tests can find health problems at the earliest stages, when they are easiest to treat. [facility] follows the current, evidence-based guidelines published by the [facility] ([facility]) when recommending preventive services for our patients. Because we follow these guidelines, sometimes recommendations change over time as research supports it. (For example, mammograms used to be recommended annually. Even though Medicare will still pay for an annual mammogram, the newer guidelines recommend a mammogram every two years for women of average risk). Of course, you and your doctor may decide to screen more often for some diseases, based on your risk and your co-morbidities (chronic disease you are already diagnosed with). Preventive services for you include:  - Medicare offers their members a free annual wellness visit, which is time for you and your primary care provider to discuss and plan for your preventive service needs. Take advantage of this benefit every year!  -All adults over the age of 72 should receive the recommended pneumonia vaccines. Current USPSTF guidelines recommend a series of two vaccines for the best pneumonia protection.   -All adults should have a flu vaccine yearly and a tetanus vaccine every 10 years.   -All adults age 48 and older should receive the shingles vaccines (series of two vaccines).       -All adults age 38-68 who are overweight should have a diabetes screening test once every three years.   -All adults born between [birth year] and 1965 should be screened once for Hepatitis C.  -Other screening tests and preventive services for persons with diabetes include: an eye exam to screen for diabetic retinopathy, a kidney function test, a foot exam, and stricter control over your cholesterol.   -Cardiovascular screening for adults with routine risk involves an electrocardiogram (ECG) at intervals determined by your doctor.   -Colorectal cancer screenings should be done for adults age 54-65 with no increased risk factors for colorectal cancer. There are a number of acceptable methods of screening for this type of cancer. Each test has its own benefits and drawbacks. Discuss with your doctor what is most appropriate for you during your annual wellness visit. The different tests include: colonoscopy (considered the best screening method), a fecal occult blood test, a fecal DNA test, and sigmoidoscopy.    -A bone mass density test is recommended when a woman turns 65 to screen for osteoporosis. This test is only recommended one time, as a screening. Some providers will use this same test as a disease monitoring tool if you already have osteoporosis. -Breast cancer screenings are recommended every other year for women of normal risk, age 54-69.  -Cervical cancer screenings for women over age 72 are only recommended with certain risk factors.      Here is a list of your current Health Maintenance items (your personalized list of preventive services) with a due date:  Health Maintenance Due   Topic Date Due    COVID-19 Vaccine (1) Never done    Colorectal Screening  05/22/2020    Yearly Flu Vaccine (1) 09/01/2021    Hemoglobin A1C    11/06/2021    Cholesterol Test   11/06/2021

## 2021-10-25 NOTE — TELEPHONE ENCOUNTER
Pt called in and is asking once you get her lab results in, if you could please mail it to her. She has the portal but no way to print it out. Thanks.

## 2021-10-25 NOTE — PROGRESS NOTES
Chief Complaint   Patient presents with    Annual Wellness Visit    Labs     Patient in office today for medicare wellness and fasting labs. Pt would like to have zinc, vit d, potassium, magnesium, rhemuatoid factor, sed rate, factor V.   Pt denies any family history of factor V.   Pt states \"just out of curiosity. \" When asked about checking sed rate she states she just wants to make sure her immune system is healthy due to [place]. Pt is currently taking 2,000 units of vitamin D daily. Pt would like to have blood type checked. Pt declines flu shot. Pt would like to discuss aspirin 81mg guidelines. Has been seeing derm and eye doctor for a place on the right eye. Has been improving with time. Was on abx for this early September. Diagnosed with a stye. This is the Subsequent Medicare Annual Wellness Exam, performed 12 months or more after the Initial AWV or the last Subsequent AWV    I have reviewed the patient's medical history in detail and updated the computerized patient record. Assessment/Plan   Education and counseling provided:  Are appropriate based on today's review and evaluation    1. Medicare annual wellness visit, subsequent  -     TYPE & SCREEN; Future  2. Meningioma ([facility] [address]  -     MRI BRAIN W CONT; Future  History of possible menigoma on head CT in 2019. Recommended pt complete MRI of brain for further evaluation. 3. Chalazion of right eye, unspecified eyelid  Continue to follow up with eye doctor as needed for ongoing management. 4. Elevated cholesterol  -     LIPID PANEL; Future  Will notify results and deviate plan based on findings. Continue lipitor daily as prescribed. 5. Elevated blood sugar  -     HEMOGLOBIN A1C WITH EAG; Future  -     TSH 3RD GENERATION; Future  Will notify results and deviate plan based on findings. 6. Hypercalcemia  -     METABOLIC PANEL, COMPREHENSIVE; Future  -     CBC WITH AUTOMATED DIFF; Future  -     ZINC;  Future  -     MAGNESIUM; Future  -     RA + CCP ABS; Future  -     SED RATE (ESR); Future  Will notify results and deviate plan based on findings. Pt requesting multiple labs outside of routine physical labs. 7. Vitamin D deficiency  -     METABOLIC PANEL, COMPREHENSIVE; Future  -     VITAMIN D, 25 HYDROXY; Future  Will notify results and deviate plan based on findings. Currently taking 2,000 units of vitamin D daily. 8. Bone marrow transplant status ([facility] [address]  History of leukemia. Depression Risk Factor Screening     3 most recent PHQ Screens 10/25/2021   Little interest or pleasure in doing things Not at all   Feeling down, depressed, irritable, or hopeless Not at all   Total Score PHQ 2 0       Alcohol Risk Screen    Do you average more than 1 drink per night or more than 7 drinks a week:  No    On any one occasion in the past three months have you have had more than 3 drinks containing alcohol:  No        Functional Ability and Level of Safety    Hearing: Hearing is good. Activities of Daily Living: The home contains: no safety equipment. Patient does total self care      Ambulation: with no difficulty     Fall Risk:  Fall Risk Assessment, last 12 mths 10/25/2021   Able to walk? Yes   Fall in past 12 months? 0   Do you feel unsteady?  0   Are you worried about falling 0      Abuse Screen:  Patient is not abused       Cognitive Screening    Has your family/caregiver stated any concerns about your memory: no     Cognitive Screening: Normal - MMSE (Mini Mental Status Exam)    Objective:     Vitals:    10/25/21 0738   BP: 109/73   Pulse: 72   Resp: 18   Temp: 98.2 °F (36.8 °C)   TempSrc: Oral   SpO2: 98%   Weight: 189 lb (85.7 kg)   Height: 5' 4\" (1.626 m)     General appearance - alert, well appearing, and in no distress  Mental status - alert, oriented to person, place, and time, normal mood, behavior, speech, dress, motor activity, and thought processes  Eyes - pupils equal and reactive, extraocular eye movements intact; right upper eye lid chalazion  Ears - bilateral TM's and external ear canals normal  Nose - normal and patent, no erythema, discharge or polyps and normal nontender sinuses  Mouth - mucous membranes moist, pharynx normal without lesions  Neck - supple, chronic enlarged lymph node right anterior cervical chain, carotids upstroke normal bilaterally, no bruits, thyroid exam: thyroid is normal in size without nodules or tenderness  Chest - clear to auscultation, no wheezes, rales or rhonchi, symmetric air entry  Heart - normal rate, regular rhythm, normal S1, S2, no murmurs  Extremities - peripheral pulses normal, no pedal edema, spider veins bilateral LEs, no clubbing or cyanosis  Skin - normal coloration and turgor    Health Maintenance Due     Health Maintenance Due   Topic Date Due    COVID-19 Vaccine (1) Never done    Colorectal Cancer Screening Combo  05/22/2020    Flu Vaccine (1) 09/01/2021    A1C test (Diabetic or Prediabetic)  11/06/2021    Lipid Screen  11/06/2021       Patient Care Team   Patient Care Team:  Mariza Johnson NP as PCP - General (Nurse Practitioner)  Mariza Johnson NP as PCP - [facility] Empaneled Provider    History     Patient Active Problem List   Diagnosis Code    Hypercalcemia E83.52    Vitamin D deficiency E55.9    Elevated blood sugar R73.9    Elevated cholesterol E78.00    Bone marrow transplant status ([facility] [address] Z94.81     Past Medical History:   Diagnosis Date    Hypertension     Leukemia ([facility] [address]       Past Surgical History:   Procedure Laterality Date    HX BONE MARROW TRANSPLANT      HX BUNIONECTOMY      HX CHOLECYSTECTOMY      HX ORTHOPAEDIC       Current Outpatient Medications   Medication Sig Dispense Refill    OTHER,NON-FORMULARY, Quercetin-bid      bisoprolol-hydroCHLOROthiazide (ZIAC) 5-6.25 mg per tablet Take 1 tablet by mouth once daily 90 Tab 3    fenofibrate micronized (LOFIBRA) 134 mg capsule Take 1 capsule by mouth in the morning 90 Cap 3    atorvastatin (LIPITOR) 40 mg tablet Take 1 tablet by mouth once daily 90 Tab 3    ZINC PO Take  by mouth.  ascorbic acid (VITAMIN C PO) Take  by mouth.  melatonin 3 mg tablet Take  by mouth.  D-MANNOSE PO Take  by mouth. Takes 9 caps daily      OTHER Drinks Lemon in 64 oz of water daily.  UBIDECARENONE (ULTRA COQ10 PO) Take 200 mg by mouth.  conjugated estrogens (PREMARIN) 0.625 mg/gram vaginal cream Apply 0.5 g to affected area daily. 30 g 0    B.infantis-B.ani-B.long-B.bifi (PROBIOTIC 4X) 10-15 mg TbEC Take  by mouth.  cinnamon bark (CINNAMON) 500 mg cap TID 90 Cap 3    DOCOSAHEXANOIC ACID/EPA (FISH OIL PO) Take 1,200 mg by mouth daily. Twice daily      aspirin delayed-release 81 mg tablet Take  by mouth daily.  cholecalciferol (VITAMIN D3) 1,000 unit cap Take 2,000 Units by mouth daily.  Blood-Glucose Meter monitoring kit Please check blood sugar once daily. E11.9 Please provide with a freestyle product.  (Patient not taking: Reported on 10/25/2021) 1 Kit 0     Allergies   Allergen Reactions    Codeine Nausea and Vomiting    Erythromycin Nausea and Vomiting    Macrobid [Nitrofurantoin Monohyd/M-Cryst] Diarrhea and Nausea and Vomiting       Family History   Problem Relation Age of Onset    Hypertension Mother     Diabetes Father     Breast Cancer Sister 62     Social History     Tobacco Use    Smoking status: Former Smoker    Smokeless tobacco: Never Used   Substance Use Topics    Alcohol use: No         Faina Maynard NP

## 2021-10-25 NOTE — PROGRESS NOTES
Chief Complaint   Patient presents with    Annual Wellness Visit    Labs     Patient in office today for medicare wellness and fasting labs. Pt would like to have zinc,vit d,potassium,magnesium,rhemuatoid factor,sed rate,factor IV. Pt would like to have blood type checked. Pt declines flu shot. Pt would like to discuss aspirin 81mg guidelines. 1. Have you been to the ER, urgent care clinic since your last visit? Hospitalized since your last visit? No    2. Have you seen or consulted any other health care providers outside of the [address] [place] [address] since your last visit? Include any pap smears or colon screening.  No

## 2021-10-28 LAB
25(OH)D3+25(OH)D2 SERPL-MCNC: 41.3 NG/ML (ref 30–100)
ABO GROUP BLD: NORMAL
ALBUMIN SERPL-MCNC: 4.6 G/DL (ref 3.8–4.8)
ALBUMIN/GLOB SERPL: 1.6 {RATIO} (ref 1.2–2.2)
ALP SERPL-CCNC: 72 IU/L (ref 44–121)
ALT SERPL-CCNC: 23 IU/L (ref 0–32)
AST SERPL-CCNC: 26 IU/L (ref 0–40)
BASOPHILS # BLD AUTO: 0.1 X10E3/UL (ref 0–0.2)
BASOPHILS NFR BLD AUTO: 1 %
BILIRUB SERPL-MCNC: 0.5 MG/DL (ref 0–1.2)
BLD GP AB SCN SERPL QL: NEGATIVE
BUN SERPL-MCNC: 20 MG/DL (ref 8–27)
BUN/CREAT SERPL: 20 (ref 12–28)
CALCIUM SERPL-MCNC: 10.8 MG/DL (ref 8.7–10.3)
CCP IGA+IGG SERPL IA-ACNC: 7 UNITS (ref 0–19)
CHLORIDE SERPL-SCNC: 103 MMOL/L (ref 96–106)
CHOLEST SERPL-MCNC: 154 MG/DL (ref 100–199)
CO2 SERPL-SCNC: 23 MMOL/L (ref 20–29)
CREAT SERPL-MCNC: 0.99 MG/DL (ref 0.57–1)
EOSINOPHIL # BLD AUTO: 0.1 X10E3/UL (ref 0–0.4)
EOSINOPHIL NFR BLD AUTO: 2 %
ERYTHROCYTE [DISTWIDTH] IN BLOOD BY AUTOMATED COUNT: 14.3 % (ref 11.7–15.4)
ERYTHROCYTE [SEDIMENTATION RATE] IN BLOOD BY WESTERGREN METHOD: 11 MM/HR (ref 0–40)
EST. AVERAGE GLUCOSE BLD GHB EST-MCNC: 120 MG/DL
GLOBULIN SER CALC-MCNC: 2.8 G/DL (ref 1.5–4.5)
GLUCOSE SERPL-MCNC: 98 MG/DL (ref 65–99)
HBA1C MFR BLD: 5.8 % (ref 4.8–5.6)
HCT VFR BLD AUTO: 43.3 % (ref 34–46.6)
HDLC SERPL-MCNC: 55 MG/DL
HGB BLD-MCNC: 14.1 G/DL (ref 11.1–15.9)
IMM GRANULOCYTES # BLD AUTO: 0 X10E3/UL (ref 0–0.1)
IMM GRANULOCYTES NFR BLD AUTO: 0 %
IMP & REVIEW OF LAB RESULTS: NORMAL
INTERPRETATION: NORMAL
LDLC SERPL CALC-MCNC: 77 MG/DL (ref 0–99)
LYMPHOCYTES # BLD AUTO: 3.1 X10E3/UL (ref 0.7–3.1)
LYMPHOCYTES NFR BLD AUTO: 40 %
MAGNESIUM SERPL-MCNC: 2 MG/DL (ref 1.6–2.3)
MCH RBC QN AUTO: 29 PG (ref 26.6–33)
MCHC RBC AUTO-ENTMCNC: 32.6 G/DL (ref 31.5–35.7)
MCV RBC AUTO: 89 FL (ref 79–97)
MONOCYTES # BLD AUTO: 0.6 X10E3/UL (ref 0.1–0.9)
MONOCYTES NFR BLD AUTO: 8 %
NEUTROPHILS # BLD AUTO: 3.9 X10E3/UL (ref 1.4–7)
NEUTROPHILS NFR BLD AUTO: 49 %
PLATELET # BLD AUTO: 329 X10E3/UL (ref 150–450)
POTASSIUM SERPL-SCNC: 4.7 MMOL/L (ref 3.5–5.2)
PROT SERPL-MCNC: 7.4 G/DL (ref 6–8.5)
RBC # BLD AUTO: 4.87 X10E6/UL (ref 3.77–5.28)
RH BLD: POSITIVE
RHEUMATOID FACT SERPL-ACNC: 12.6 IU/ML (ref 0–13.9)
SODIUM SERPL-SCNC: 138 MMOL/L (ref 134–144)
TRIGL SERPL-MCNC: 122 MG/DL (ref 0–149)
TSH SERPL DL<=0.005 MIU/L-ACNC: 3.54 UIU/ML (ref 0.45–4.5)
VLDLC SERPL CALC-MCNC: 22 MG/DL (ref 5–40)
WBC # BLD AUTO: 7.9 X10E3/UL (ref 3.4–10.8)
ZINC SERPL-MCNC: 128 UG/DL (ref 44–115)

## 2021-10-28 NOTE — PROGRESS NOTES
The following message was sent to pt via [employer] portal in reference to lab results:  Good morning Ms. Rodney Ford,   Attached are the results of your most recent lab work. I have the following recommendations:    1. Your CBC which looks at your white blood cells, red blood cells, and hemoglobin came back looking normal. No sign of infection or anemia. 2. Your metabolic panel which looks at your blood glucose, liver function, and kidney function looks good minus your calcium being persistently high. 3. Your cholesterol came back looking great so keep up the good work with diet and keep taking your Lipitor daily as prescribed. 4. Your hemoglobin a1c shows that your prediabetes has worsened slightly but is still at goal. Try your best to follow a low carb diet to prevent this from worsening. If it creeps up much higher then I am going to urge you to considers starting a new daily medication. 5. Your TSH which screens for thyroid disease came back normal. This means you do not have hyper or hypothyroidism. 6. Your zinc levels are slightly elevated from the supplementation you've been taking. 7. Your vitamin D level came back normal showing that you are not Vitamin D deficient and do not require any additional supplementation. 8. Your magnesium levels are normal.     9. You are negative for rheumatoid arthritis. 10. Your ESR or sed rate is normal which is a marker for inflammation. 11. Your labs show that your blood type is O Positive. Please let me know if you have any questions or concerns regarding these results.    TOMMY Johnson

## 2021-11-12 ENCOUNTER — TELEPHONE (OUTPATIENT)
Dept: FAMILY MEDICINE CLINIC | Age: 71
End: 2021-11-12

## 2021-11-12 DIAGNOSIS — D32.9 MENINGIOMA (HCC): Primary | ICD-10-CM

## 2021-11-12 NOTE — TELEPHONE ENCOUNTER
Patient has cancelled MRI that was requested by provider due to insurance would be $250.00. She would like to have CT Brain done instead.  Patient's phone: [phone]

## 2021-11-18 ENCOUNTER — TELEPHONE (OUTPATIENT)
Dept: FAMILY MEDICINE CLINIC | Age: 71
End: 2021-11-18

## 2021-11-18 ENCOUNTER — TRANSCRIBE ORDER (OUTPATIENT)
Dept: SCHEDULING | Age: 71
End: 2021-11-18

## 2021-11-18 ENCOUNTER — HOSPITAL ENCOUNTER (OUTPATIENT)
Dept: CT IMAGING | Age: 71
Discharge: HOME | End: 2021-11-18
Payer: MEDICARE

## 2021-11-18 DIAGNOSIS — D32.9 MENINGIOMA (HCC): Primary | ICD-10-CM

## 2021-11-18 DIAGNOSIS — D32.9 MENINGIOMA (HCC): ICD-10-CM

## 2021-11-18 PROCEDURE — 74011000636 HC RX REV CODE- 636

## 2021-11-18 PROCEDURE — 70470 CT HEAD/BRAIN W/O & W/DYE: CPT

## 2021-11-18 RX ADMIN — IOPAMIDOL 100 ML: 612 INJECTION, SOLUTION INTRAVENOUS at 17:13

## 2021-11-18 NOTE — TELEPHONE ENCOUNTER
Francesca[facility] [place] would like a call regarding an CT with and without contrast. Francesca phone: [phone]. Patient has an appointment today.

## 2021-11-19 NOTE — PROGRESS NOTES
The following message was sent to pt via [employer] portal in reference to lab results:  Good morning Ms. Dorina Emery,   Attached are the results of your head CT. It shows that the meningioma has not changed compared to your CT done in 2019. It is still measuring to be 7 mm. There are no additional abnormal or concerning findings. Guidelines recommend that small asymptomatic meningioma's get checked with serial imaging yearly for about 3-5 years and then every 2-3 years from there. We can discuss any additional imaging in the future at follow up visits and decide together when to repeat another CT. Please let me know if you have any questions or concerns regarding these results.    TOMMY Partida

## 2022-02-21 ENCOUNTER — TELEPHONE (OUTPATIENT)
Dept: FAMILY MEDICINE CLINIC | Age: 72
End: 2022-02-21

## 2022-02-21 DIAGNOSIS — Z01.84 IMMUNITY STATUS TESTING: Primary | ICD-10-CM

## 2022-02-21 NOTE — TELEPHONE ENCOUNTER
----- Message from Jaky Qureshi sent at 2/21/2022 12:46 PM EST -----  Subject: Referral Request    QUESTIONS   Reason for referral request? Pt would like to have Covid antibody test   Has the physician seen you for this condition before? No   Preferred Specialist (if applicable)? Do you already have an appointment scheduled? No  Additional Information for Provider? Would like to have this done at   [facility]; please call her to confirm orders have been ordered  ---------------------------------------------------------------------------  --------------  [address]  What is the best way for the office to contact you? OK to leave message on   voicemail  Preferred Call Back Phone Number?  [phone]

## 2022-02-22 NOTE — TELEPHONE ENCOUNTER
Pt called back and I did let her know of locations she could go to. She also stated that she would like to  her lab req this afternoon after she gets off please. Thanks.

## 2022-02-22 NOTE — TELEPHONE ENCOUNTER
Pt notified via vm. Advised to call nurse back with [facility] location or if pt would like to  lab req.

## 2022-02-25 LAB
SARS-COV-2 SEMI-QUANT IGG AB: <13 AU/ML
SARS-COV-2 SPIKE AB, INTERP - CVSQ1M: NEGATIVE

## 2022-03-07 RX ORDER — ATORVASTATIN CALCIUM 40 MG/1
TABLET, FILM COATED ORAL
Qty: 90 TABLET | Refills: 0 | Status: SHIPPED | OUTPATIENT
Start: 2022-03-07 | End: 2022-07-15 | Stop reason: SDUPTHER

## 2022-03-07 RX ORDER — BISOPROLOL FUMARATE AND HYDROCHLOROTHIAZIDE 5; 6.25 MG/1; MG/1
TABLET ORAL
Qty: 90 TABLET | Refills: 0 | Status: SHIPPED | OUTPATIENT
Start: 2022-03-07 | End: 2022-07-15 | Stop reason: SDUPTHER

## 2022-03-07 RX ORDER — FENOFIBRATE 134 MG/1
CAPSULE ORAL
Qty: 90 CAPSULE | Refills: 0 | Status: SHIPPED | OUTPATIENT
Start: 2022-03-07 | End: 2022-07-15 | Stop reason: SDUPTHER

## 2022-03-18 PROBLEM — E55.9 VITAMIN D DEFICIENCY: Status: ACTIVE | Noted: 2017-01-17

## 2022-03-18 PROBLEM — R73.9 ELEVATED BLOOD SUGAR: Status: ACTIVE | Noted: 2017-11-30

## 2022-03-18 PROBLEM — E78.00 ELEVATED CHOLESTEROL: Status: ACTIVE | Noted: 2017-11-30

## 2022-03-19 PROBLEM — Z94.81 BONE MARROW TRANSPLANT STATUS (HCC): Status: ACTIVE | Noted: 2018-03-15

## 2022-03-19 PROBLEM — E83.52 HYPERCALCEMIA: Status: ACTIVE | Noted: 2017-01-17

## 2022-04-19 ENCOUNTER — TRANSCRIBE ORDER (OUTPATIENT)
Dept: SCHEDULING | Age: 72
End: 2022-04-19

## 2022-04-19 DIAGNOSIS — Z12.31 VISIT FOR SCREENING MAMMOGRAM: Primary | ICD-10-CM

## 2022-05-20 NOTE — PROGRESS NOTES
Chief Complaint   Patient presents with    Cholesterol Problem    Blood sugar problem    Labs     Patient in office today for f/u and fasting labs. Have no concerns. 1. Have you been to the ER, urgent care clinic since your last visit? Hospitalized since your last visit? No    2. Have you seen or consulted any other health care providers outside of the [address] [place] [address] since your last visit? Include any pap smears or colon screening.  No Please advise:  Patient reports worse anxiety and irritability since visit on 5/13/22, does not note a specific trigger. She has also been feeling more fatigued. Hydroxyzine makes her tired so she takes it at night to relieve anxiety at bedtime, has been taking one tablet only and feels has provided a small amount of relief. RN reminded patient hydroxyzine prescription is for 1-2 tablets so she can try taking one more tablet if she wants. Patient denied suicidal ideation, reports  is supportive and aware of symptoms. Consented to PHQ/OLIVA.    Patient is requesting an increase in Duloxetine and requests it be sent to [facility] in [place] if approved.    She is also requesting referral to Behavioral Health be sent to [facility] to see if she can get in sooner there.    Last four GAD7 Assessments       GAD7 5/19/2022 12/21/2021 6/11/2021   GAD7 Score 16 19 16   Feeling nervous, anxious or on edge Nearly every day Nearly every day Nearly every day   Not being able to stop or control worrying Nearly every day Nearly every day More than half the days   Worrying too much about different things More than half the days Nearly every day More than half the days   Trouble relaxing Nearly every day Nearly every day Nearly every day   Being so restless that it's hard to sit still More than half the days Several days Nearly every day   Becoming easily annoyed or irritable Nearly every day Nearly every day Nearly every day   Feeling afraid as if something awful might happen Not at all Nearly every day Not at all   Ability to handle work, home and other people Somewhat difficult - Extremely difficult    Last four PHQ 2/9 Test Results  0: Not at all  1: Several days  2: More than half the days  3: Nearly every day      Date 5/20/2022 12/21/2021 6/11/2021 6/17/2020   Adult PHQ 2 Score 4 6 5 0   Adult PHQ 2 Interpretation Further screening needed Further screening needed Further screening needed No  further screening needed     Date 5/20/2022 12/21/2021 6/11/2021   Adult PHQ 9 Score 15 18 14   Adult PHQ 9 Interpretation Moderately Severe Depression Moderately Severe Depression Moderate Depression

## 2022-06-27 ENCOUNTER — TELEPHONE (OUTPATIENT)
Dept: FAMILY MEDICINE CLINIC | Age: 72
End: 2022-06-27

## 2022-06-27 NOTE — TELEPHONE ENCOUNTER
Patient called and would like a call back at [phone]. She states that she had blood work done and wanted to know if we got the results and to discuss them with her.  Please return her call

## 2022-06-27 NOTE — TELEPHONE ENCOUNTER
Spoke with pt stated she received colorguard from [facility] and results came back abnormal-positive. Advised abnormal could be from a number of reasons,next step would be to est with gi specialist for colonoscopy. Pt stated she has gi specialist in mind,advised once determine if in pt's network notify nurse with provider's name,time and date of appt so referral can be placed.

## 2022-07-08 ENCOUNTER — TELEPHONE (OUTPATIENT)
Dept: FAMILY MEDICINE CLINIC | Age: 72
End: 2022-07-08

## 2022-07-08 NOTE — TELEPHONE ENCOUNTER
Avoid any of the following 7 days prior to colonoscopy: ibuprofen, Motrin, Aleve, Advil, and Naproxen. Otherwise she should be fine to continue her other prescriptions.

## 2022-07-08 NOTE — TELEPHONE ENCOUNTER
Patient is scheduled for a colonoscopy Aug 31. She wants to know if she should stop any of her med prior to the procedure.

## 2022-07-08 NOTE — TELEPHONE ENCOUNTER
Pt has been advised of NP recommendations. Nurse reviewed clear liquid diet with pt-seemed unsure with some of the choices nurse advised are ok-advised can call nurse back with any specific items,refer to list gi specialist gave pt,or contact gi office.

## 2022-07-15 RX ORDER — ATORVASTATIN CALCIUM 40 MG/1
40 TABLET, FILM COATED ORAL DAILY
Qty: 90 TABLET | Refills: 1 | Status: SHIPPED | OUTPATIENT
Start: 2022-07-15

## 2022-07-15 RX ORDER — FENOFIBRATE 134 MG/1
CAPSULE ORAL
Qty: 30 CAPSULE | Refills: 0 | Status: SHIPPED | OUTPATIENT
Start: 2022-07-15

## 2022-07-15 RX ORDER — FENOFIBRATE 134 MG/1
CAPSULE ORAL
Qty: 90 CAPSULE | Refills: 1 | Status: SHIPPED | OUTPATIENT
Start: 2022-07-15 | End: 2022-07-15 | Stop reason: SDUPTHER

## 2022-07-15 RX ORDER — BISOPROLOL FUMARATE AND HYDROCHLOROTHIAZIDE 5; 6.25 MG/1; MG/1
1 TABLET ORAL DAILY
Qty: 90 TABLET | Refills: 1 | Status: SHIPPED | OUTPATIENT
Start: 2022-07-15

## 2022-07-15 NOTE — TELEPHONE ENCOUNTER
Received call from patient. She now uses mail order and needs her atorvastatin, bisoprolol-HCTZ and fenofibrate prescriptions sent to [facility]. Additionally she is about to run out of her fenofibrate and is asking if she can also get a 1 month supply of that sent to [facility] on file until she gets her script from [facility].

## 2022-07-25 ENCOUNTER — HOSPITAL ENCOUNTER (OUTPATIENT)
Dept: MAMMOGRAPHY | Age: 72
Discharge: HOME | End: 2022-07-25
Payer: MEDICARE

## 2022-07-25 DIAGNOSIS — Z12.31 VISIT FOR SCREENING MAMMOGRAM: ICD-10-CM

## 2022-07-25 PROCEDURE — 77067 SCR MAMMO BI INCL CAD: CPT

## 2022-08-13 ENCOUNTER — TELEPHONE (OUTPATIENT)
Dept: FAMILY MEDICINE CLINIC | Age: 72
End: 2022-08-13

## 2022-08-13 RX ORDER — ONDANSETRON 8 MG/1
8 TABLET, ORALLY DISINTEGRATING ORAL
Qty: 15 TABLET | Refills: 0 | Status: SHIPPED | OUTPATIENT
Start: 2022-08-13 | End: 2022-08-18

## 2022-08-14 ENCOUNTER — APPOINTMENT (OUTPATIENT)
Dept: GENERAL RADIOLOGY | Age: 72
DRG: 393 | End: 2022-08-14
Attending: STUDENT IN AN ORGANIZED HEALTH CARE EDUCATION/TRAINING PROGRAM
Payer: MEDICARE

## 2022-08-14 ENCOUNTER — HOSPITAL ENCOUNTER (INPATIENT)
Age: 72
LOS: 1 days | Discharge: HOME | DRG: 393 | End: 2022-08-15
Attending: STUDENT IN AN ORGANIZED HEALTH CARE EDUCATION/TRAINING PROGRAM | Admitting: FAMILY MEDICINE
Payer: MEDICARE

## 2022-08-14 DIAGNOSIS — E87.1 HYPONATREMIA: Primary | ICD-10-CM

## 2022-08-14 DIAGNOSIS — U07.1 COVID: ICD-10-CM

## 2022-08-14 DIAGNOSIS — R09.02 HYPOXIA: ICD-10-CM

## 2022-08-14 LAB
ALBUMIN SERPL-MCNC: 4.2 G/DL (ref 3.5–5.2)
ALBUMIN/GLOB SERPL: 1.3 {RATIO} (ref 1.1–2.2)
ALP SERPL-CCNC: 73 U/L (ref 35–104)
ALT SERPL-CCNC: 78 U/L (ref 10–35)
ANION GAP SERPL CALC-SCNC: 14 MMOL/L (ref 5–15)
AST SERPL-CCNC: 109 U/L (ref 10–35)
BASOPHILS # BLD: 0 K/UL (ref 0–0.1)
BASOPHILS NFR BLD: 0 % (ref 0–1)
BILIRUB SERPL-MCNC: 0.6 MG/DL (ref 0.2–1)
BUN SERPL-MCNC: 13 MG/DL (ref 8–23)
BUN/CREAT SERPL: 17 (ref 12–20)
CALCIUM SERPL-MCNC: 9.9 MG/DL (ref 8.8–10.2)
CHLORIDE SERPL-SCNC: 87 MMOL/L (ref 98–107)
CO2 SERPL-SCNC: 21 MMOL/L (ref 22–29)
CREAT SERPL-MCNC: 0.76 MG/DL (ref 0.5–0.9)
DIFFERENTIAL METHOD BLD: ABNORMAL
EOSINOPHIL # BLD: 0 K/UL (ref 0–0.4)
EOSINOPHIL NFR BLD: 0 % (ref 0–7)
ERYTHROCYTE [DISTWIDTH] IN BLOOD BY AUTOMATED COUNT: 14.5 % (ref 11.5–14.5)
GLOBULIN SER CALC-MCNC: 3.3 G/DL (ref 2–4)
GLUCOSE SERPL-MCNC: 115 MG/DL (ref 65–100)
HCT VFR BLD AUTO: 41.1 % (ref 35–47)
HGB BLD-MCNC: 14.1 G/DL (ref 11.5–16)
IMM GRANULOCYTES # BLD AUTO: 0 K/UL (ref 0–0.04)
IMM GRANULOCYTES NFR BLD AUTO: 0 % (ref 0–0.5)
LYMPHOCYTES # BLD: 2.1 K/UL (ref 0.8–3.5)
LYMPHOCYTES NFR BLD: 33 % (ref 12–49)
MCH RBC QN AUTO: 28.5 PG (ref 26–34)
MCHC RBC AUTO-ENTMCNC: 34.3 G/DL (ref 30–36.5)
MCV RBC AUTO: 83.2 FL (ref 80–99)
MONOCYTES # BLD: 0.4 K/UL (ref 0–1)
MONOCYTES NFR BLD: 6 % (ref 5–13)
NEUTS SEG # BLD: 3.8 K/UL (ref 1.8–8)
NEUTS SEG NFR BLD: 60 % (ref 32–75)
NRBC # BLD: 0 K/UL (ref 0–0.01)
NRBC BLD-RTO: 0 PER 100 WBC
PLATELET # BLD AUTO: 236 K/UL (ref 150–400)
PMV BLD AUTO: 8.4 FL (ref 8.9–12.9)
POTASSIUM SERPL-SCNC: 4.6 MMOL/L (ref 3.5–5.1)
PROT SERPL-MCNC: 7.5 G/DL (ref 6.4–8.3)
RBC # BLD AUTO: 4.94 M/UL (ref 3.8–5.2)
SODIUM SERPL-SCNC: 122 MMOL/L (ref 136–145)
WBC # BLD AUTO: 6.3 K/UL (ref 3.6–11)

## 2022-08-14 PROCEDURE — 85379 FIBRIN DEGRADATION QUANT: CPT

## 2022-08-14 PROCEDURE — 80053 COMPREHEN METABOLIC PANEL: CPT

## 2022-08-14 PROCEDURE — 85025 COMPLETE CBC W/AUTO DIFF WBC: CPT

## 2022-08-14 PROCEDURE — 99285 EMERGENCY DEPT VISIT HI MDM: CPT

## 2022-08-14 PROCEDURE — 71046 X-RAY EXAM CHEST 2 VIEWS: CPT

## 2022-08-14 PROCEDURE — 36415 COLL VENOUS BLD VENIPUNCTURE: CPT

## 2022-08-14 PROCEDURE — 84443 ASSAY THYROID STIM HORMONE: CPT

## 2022-08-14 PROCEDURE — 83615 LACTATE (LD) (LDH) ENZYME: CPT

## 2022-08-14 PROCEDURE — 86140 C-REACTIVE PROTEIN: CPT

## 2022-08-14 RX ORDER — ONDANSETRON 2 MG/ML
4 INJECTION INTRAMUSCULAR; INTRAVENOUS
Status: COMPLETED | OUTPATIENT
Start: 2022-08-14 | End: 2022-08-15

## 2022-08-15 VITALS
HEART RATE: 69 BPM | HEIGHT: 63 IN | SYSTOLIC BLOOD PRESSURE: 152 MMHG | RESPIRATION RATE: 17 BRPM | WEIGHT: 192.46 LBS | DIASTOLIC BLOOD PRESSURE: 69 MMHG | OXYGEN SATURATION: 96 % | TEMPERATURE: 97.8 F | BODY MASS INDEX: 34.1 KG/M2

## 2022-08-15 PROBLEM — J96.00 RESPIRATORY FAILURE, ACUTE (HCC): Status: RESOLVED | Noted: 2022-08-15 | Resolved: 2022-08-15

## 2022-08-15 PROBLEM — R73.03 PREDIABETES: Status: ACTIVE | Noted: 2017-11-30

## 2022-08-15 PROBLEM — J96.00 RESPIRATORY FAILURE, ACUTE (HCC): Status: ACTIVE | Noted: 2022-08-15

## 2022-08-15 PROBLEM — U07.1 COVID: Status: ACTIVE | Noted: 2022-08-15

## 2022-08-15 PROBLEM — D32.9 MENINGIOMA (HCC): Status: ACTIVE | Noted: 2022-08-15

## 2022-08-15 PROBLEM — E87.1 HYPONATREMIA: Status: ACTIVE | Noted: 2022-08-15

## 2022-08-15 LAB
ALBUMIN SERPL-MCNC: 3.5 G/DL (ref 3.5–5)
ALBUMIN/GLOB SERPL: 0.9 {RATIO} (ref 1.1–2.2)
ALP SERPL-CCNC: 75 U/L (ref 45–117)
ALT SERPL-CCNC: 80 U/L (ref 12–78)
ANION GAP SERPL CALC-SCNC: 12 MMOL/L (ref 5–15)
ANION GAP SERPL CALC-SCNC: 14 MMOL/L (ref 5–15)
APPEARANCE UR: CLEAR
AST SERPL-CCNC: 83 U/L (ref 15–37)
BACTERIA URNS QL MICRO: NEGATIVE /HPF
BILIRUB SERPL-MCNC: 0.5 MG/DL (ref 0.2–1)
BILIRUB UR QL: NEGATIVE
BUN SERPL-MCNC: 11 MG/DL (ref 6–20)
BUN SERPL-MCNC: 11 MG/DL (ref 8–23)
BUN/CREAT SERPL: 10 (ref 12–20)
BUN/CREAT SERPL: 15 (ref 12–20)
CALCIUM SERPL-MCNC: 9.4 MG/DL (ref 8.8–10.2)
CALCIUM SERPL-MCNC: 9.6 MG/DL (ref 8.5–10.1)
CHLORIDE SERPL-SCNC: 92 MMOL/L (ref 98–107)
CHLORIDE SERPL-SCNC: 97 MMOL/L (ref 97–108)
CHOLEST SERPL-MCNC: 114 MG/DL
CO2 SERPL-SCNC: 20 MMOL/L (ref 22–29)
CO2 SERPL-SCNC: 22 MMOL/L (ref 21–32)
COLOR UR: NORMAL
COVID-19 RAPID TEST, COVR: DETECTED
CREAT SERPL-MCNC: 0.71 MG/DL (ref 0.5–0.9)
CREAT SERPL-MCNC: 1.11 MG/DL (ref 0.55–1.02)
CRP SERPL-MCNC: 0.12 MG/DL
D DIMER PPP FEU-MCNC: 0.9 UG/ML(FEU)
EPITH CASTS URNS QL MICRO: NORMAL /LPF
FERRITIN SERPL-MCNC: 1676 NG/ML (ref 26–388)
GLOBULIN SER CALC-MCNC: 4.1 G/DL (ref 2–4)
GLUCOSE SERPL-MCNC: 102 MG/DL (ref 65–100)
GLUCOSE SERPL-MCNC: 132 MG/DL (ref 65–100)
GLUCOSE UR STRIP.AUTO-MCNC: NEGATIVE MG/DL
HDLC SERPL-MCNC: 42 MG/DL
HDLC SERPL: 2.7 {RATIO} (ref 0–5)
HGB UR QL STRIP: NEGATIVE
KETONES UR QL STRIP.AUTO: NEGATIVE MG/DL
LDH SERPL L TO P-CCNC: 419 U/L (ref 81–246)
LDLC SERPL CALC-MCNC: 28.6 MG/DL (ref 0–100)
LEUKOCYTE ESTERASE UR QL STRIP.AUTO: NEGATIVE
NITRITE UR QL STRIP.AUTO: NEGATIVE
OSMOLALITY SERPL: 266 MOSM/KG H2O
OSMOLALITY UR: 203 MOSM/KG H2O
PH UR STRIP: 6.5 [PH] (ref 5–8)
POTASSIUM SERPL-SCNC: 3.7 MMOL/L (ref 3.5–5.1)
POTASSIUM SERPL-SCNC: 4 MMOL/L (ref 3.5–5.1)
PROT SERPL-MCNC: 7.6 G/DL (ref 6.4–8.2)
PROT UR STRIP-MCNC: NEGATIVE MG/DL
RBC #/AREA URNS HPF: NORMAL /HPF
SODIUM SERPL-SCNC: 126 MMOL/L (ref 136–145)
SODIUM SERPL-SCNC: 131 MMOL/L (ref 136–145)
SODIUM UR-SCNC: 61 MMOL/L
SOURCE, COVRS: ABNORMAL
SP GR UR REFRACTOMETRY: 1.02 (ref 1–1.03)
T4 FREE SERPL-MCNC: 1 NG/DL (ref 0.8–1.5)
TRIGL SERPL-MCNC: 217 MG/DL (ref ?–150)
TSH SERPL DL<=0.05 MIU/L-ACNC: 7.15 UIU/ML (ref 0.27–4.2)
UR CULT HOLD, URHOLD: NORMAL
UROBILINOGEN UR QL STRIP.AUTO: 0.2 EU/DL (ref 0.2–1)
VLDLC SERPL CALC-MCNC: 43.4 MG/DL
WBC URNS QL MICRO: NORMAL /HPF (ref 0–4)

## 2022-08-15 PROCEDURE — 99235 HOSP IP/OBS SAME DATE MOD 70: CPT | Performed by: FAMILY MEDICINE

## 2022-08-15 PROCEDURE — 74011250637 HC RX REV CODE- 250/637: Performed by: STUDENT IN AN ORGANIZED HEALTH CARE EDUCATION/TRAINING PROGRAM

## 2022-08-15 PROCEDURE — 83930 ASSAY OF BLOOD OSMOLALITY: CPT

## 2022-08-15 PROCEDURE — 74011250636 HC RX REV CODE- 250/636

## 2022-08-15 PROCEDURE — 87635 SARS-COV-2 COVID-19 AMP PRB: CPT

## 2022-08-15 PROCEDURE — 65270000046 HC RM TELEMETRY

## 2022-08-15 PROCEDURE — 74011250636 HC RX REV CODE- 250/636: Performed by: STUDENT IN AN ORGANIZED HEALTH CARE EDUCATION/TRAINING PROGRAM

## 2022-08-15 PROCEDURE — 74011000250 HC RX REV CODE- 250

## 2022-08-15 PROCEDURE — 84300 ASSAY OF URINE SODIUM: CPT

## 2022-08-15 PROCEDURE — 83935 ASSAY OF URINE OSMOLALITY: CPT

## 2022-08-15 PROCEDURE — 74011250637 HC RX REV CODE- 250/637

## 2022-08-15 PROCEDURE — 96374 THER/PROPH/DIAG INJ IV PUSH: CPT

## 2022-08-15 PROCEDURE — 93005 ELECTROCARDIOGRAM TRACING: CPT

## 2022-08-15 PROCEDURE — 84439 ASSAY OF FREE THYROXINE: CPT

## 2022-08-15 PROCEDURE — 80053 COMPREHEN METABOLIC PANEL: CPT

## 2022-08-15 PROCEDURE — 81001 URINALYSIS AUTO W/SCOPE: CPT

## 2022-08-15 PROCEDURE — 82728 ASSAY OF FERRITIN: CPT

## 2022-08-15 PROCEDURE — 80061 LIPID PANEL: CPT

## 2022-08-15 RX ORDER — LANOLIN ALCOHOL/MO/W.PET/CERES
3 CREAM (GRAM) TOPICAL
Status: DISCONTINUED | OUTPATIENT
Start: 2022-08-15 | End: 2022-08-15 | Stop reason: HOSPADM

## 2022-08-15 RX ORDER — PROCHLORPERAZINE MALEATE 5 MG
5 TABLET ORAL
Qty: 28 TABLET | Refills: 0 | Status: SHIPPED | OUTPATIENT
Start: 2022-08-15 | End: 2022-08-22

## 2022-08-15 RX ORDER — ONDANSETRON 2 MG/ML
4 INJECTION INTRAMUSCULAR; INTRAVENOUS
Status: DISCONTINUED | OUTPATIENT
Start: 2022-08-15 | End: 2022-08-15 | Stop reason: HOSPADM

## 2022-08-15 RX ORDER — FAMOTIDINE 10 MG/1
10 TABLET ORAL 2 TIMES DAILY
Qty: 60 TABLET | Refills: 0 | Status: SHIPPED | OUTPATIENT
Start: 2022-08-15

## 2022-08-15 RX ORDER — SODIUM CHLORIDE 0.9 % (FLUSH) 0.9 %
5-40 SYRINGE (ML) INJECTION AS NEEDED
Status: DISCONTINUED | OUTPATIENT
Start: 2022-08-15 | End: 2022-08-15 | Stop reason: HOSPADM

## 2022-08-15 RX ORDER — ACETAMINOPHEN 325 MG/1
650 TABLET ORAL
Status: DISCONTINUED | OUTPATIENT
Start: 2022-08-15 | End: 2022-08-15 | Stop reason: HOSPADM

## 2022-08-15 RX ORDER — PROCHLORPERAZINE EDISYLATE 5 MG/ML
10 INJECTION INTRAMUSCULAR; INTRAVENOUS
Status: COMPLETED | OUTPATIENT
Start: 2022-08-15 | End: 2022-08-15

## 2022-08-15 RX ORDER — MELATONIN
2000 DAILY
Status: DISCONTINUED | OUTPATIENT
Start: 2022-08-15 | End: 2022-08-15 | Stop reason: HOSPADM

## 2022-08-15 RX ORDER — PROCHLORPERAZINE EDISYLATE 5 MG/ML
10 INJECTION INTRAMUSCULAR; INTRAVENOUS
Status: DISCONTINUED | OUTPATIENT
Start: 2022-08-15 | End: 2022-08-15 | Stop reason: HOSPADM

## 2022-08-15 RX ORDER — SODIUM CHLORIDE 9 MG/ML
125 INJECTION, SOLUTION INTRAVENOUS CONTINUOUS
Status: DISCONTINUED | OUTPATIENT
Start: 2022-08-15 | End: 2022-08-15 | Stop reason: HOSPADM

## 2022-08-15 RX ORDER — METOPROLOL TARTRATE 50 MG/1
50 TABLET ORAL EVERY 12 HOURS
Status: DISCONTINUED | OUTPATIENT
Start: 2022-08-15 | End: 2022-08-15 | Stop reason: HOSPADM

## 2022-08-15 RX ORDER — FENOFIBRATE 48 MG/1
96 TABLET, COATED ORAL DAILY
Status: DISCONTINUED | OUTPATIENT
Start: 2022-08-15 | End: 2022-08-15 | Stop reason: HOSPADM

## 2022-08-15 RX ORDER — ENOXAPARIN SODIUM 100 MG/ML
40 INJECTION SUBCUTANEOUS DAILY
Status: DISCONTINUED | OUTPATIENT
Start: 2022-08-15 | End: 2022-08-15 | Stop reason: HOSPADM

## 2022-08-15 RX ORDER — POLYETHYLENE GLYCOL 3350 17 G/17G
17 POWDER, FOR SOLUTION ORAL DAILY PRN
Status: DISCONTINUED | OUTPATIENT
Start: 2022-08-15 | End: 2022-08-15 | Stop reason: HOSPADM

## 2022-08-15 RX ORDER — SODIUM CHLORIDE 0.9 % (FLUSH) 0.9 %
5-40 SYRINGE (ML) INJECTION EVERY 8 HOURS
Status: DISCONTINUED | OUTPATIENT
Start: 2022-08-15 | End: 2022-08-15 | Stop reason: HOSPADM

## 2022-08-15 RX ORDER — ONDANSETRON 4 MG/1
4 TABLET, ORALLY DISINTEGRATING ORAL
Status: DISCONTINUED | OUTPATIENT
Start: 2022-08-15 | End: 2022-08-15 | Stop reason: HOSPADM

## 2022-08-15 RX ORDER — ACETAMINOPHEN 650 MG/1
650 SUPPOSITORY RECTAL
Status: DISCONTINUED | OUTPATIENT
Start: 2022-08-15 | End: 2022-08-15 | Stop reason: HOSPADM

## 2022-08-15 RX ADMIN — SODIUM CHLORIDE 1000 ML: 9 INJECTION, SOLUTION INTRAVENOUS at 01:07

## 2022-08-15 RX ADMIN — Medication 2000 UNITS: at 10:45

## 2022-08-15 RX ADMIN — ENOXAPARIN SODIUM 40 MG: 100 INJECTION SUBCUTANEOUS at 10:46

## 2022-08-15 RX ADMIN — FENOFIBRATE 96 MG: 48 TABLET ORAL at 10:45

## 2022-08-15 RX ADMIN — SODIUM CHLORIDE 125 ML/HR: 9 INJECTION, SOLUTION INTRAVENOUS at 11:00

## 2022-08-15 RX ADMIN — METOPROLOL TARTRATE 50 MG: 50 TABLET ORAL at 10:45

## 2022-08-15 RX ADMIN — PROCHLORPERAZINE EDISYLATE 10 MG: 5 INJECTION INTRAMUSCULAR; INTRAVENOUS at 10:44

## 2022-08-15 RX ADMIN — ONDANSETRON 4 MG: 2 INJECTION INTRAMUSCULAR; INTRAVENOUS at 01:07

## 2022-08-15 RX ADMIN — SODIUM CHLORIDE, PRESERVATIVE FREE 10 ML: 5 INJECTION INTRAVENOUS at 10:46

## 2022-08-15 RX ADMIN — PROCHLORPERAZINE EDISYLATE 10 MG: 5 INJECTION INTRAMUSCULAR; INTRAVENOUS at 02:27

## 2022-08-15 NOTE — ED TRIAGE NOTES
Patient arrives via EMS for nausea and diarrhea X3 days. Saw PCP yesterday and given zofran, states it is not helping. Had a positive home covid test Friday.

## 2022-08-15 NOTE — DISCHARGE INSTRUCTIONS
HOME DISCHARGE INSTRUCTIONS    Curt Daniels / [id number] : 1950    Admission date: 2022 Discharge date: 8/15/2022     Please bring this form with you to show your care provider at your follow-up appointment. Primary care provider:  Marilyn Interiano     Discharging provider:  Eulalia Cisneros MD  - Family Medicine Resident  Dorothea Rod DO - Attending, Family Medicine     You have been admitted to the hospital with the following diagnoses:    ACUTE DIAGNOSES:  Respiratory failure, acute ([address] [J96.00]  COVID [U07.1]  Hyponatremia [E87.1]        MEDICATION CHANGES  START  Zofran (ondansetron): place one dissolvable tablet (8mg) under the tongue as needed every 8 hours for nausea  Compazine (prochlorperazine): take one tablet (5mg) every 6 hours as needed for nausea  Pepcid (famotidine): one 10mg tablet as needed twice a day for heart burn     No other changes were made to your medications, please take all your other home medicines as previously prescribed. . . . . . . . . . . . . . . . . . . . . . . . . . . . . . . . . . . . . . . . . . . . . . . . . . . . . . . . . . . . . . . . . . . . . . . Yvon Cook FOLLOW-UP CARE RECOMMENDATIONS:  You are well enough to be discharged from the hospital. However, because you were staying in a hospital, you are at greater risk of having been exposed to the coronavirus. PLEASE stay inside and quarantine for 14 days to prevent further spread of the coronavirus. Appointments  Future Appointments   Date Time Provider Nallely Constanza   2022 10:30 AM Harlan Trujillo NP [facility]        Please follow up with your PCP regarding:  - Resolution of your low sodium and elevated liver enzymes with a repeat CMP.  - COVID symptoms resolution  - Repeat thyroid function studies for your elevated TSH    Follow-up tests needed: CMP, thyroid function tests    Pending test results:    At the time of your discharge the following test results are still pending: None.   Please make sure you review these results with your outpatient follow-up provider(s). Specific symptoms to watch for: chest pain, shortness of breath, fever, chills, nausea, vomiting, diarrhea, change in mentation, falling, weakness, bleeding. Monitor for symptoms of fatigue or weakness, change in mentation, headaches, dizziness, forgetfulness, or muscle cramps     DIET/what to eat:  Regular Diet    ACTIVITY:  Activity as tolerated    Wound care: none    Equipment needed:  none    What to do if new or unexpected symptoms occur? If you experience any of the above symptoms (or should other concerns or questions arise after discharge) please call your primary care physician. Return to the emergency room if you cannot get hold of your doctor. It is very important that you keep your follow-up appointment(s). Please bring discharge papers, medication list (and/or medication bottles) to your follow-up appointments for review by your outpatient provider(s). Please check the list of medications and be sure it includes every medication (even non-prescription medications) that your provider wants you to take. It is important that you take the medication exactly as they are prescribed. Keep your medication in the bottles provided by the pharmacist and keep a list of the medication names, dosages, and times to be taken in your wallet. Do not take other medications without consulting your doctor. If you have any questions about your medications or other instructions, please talk to your nurse or care provider before you leave the hospital.     Information obtained by:     I understand that if any problems occur once I am at home I am to contact my physician. These instructions were explained to me and I had the opportunity to ask questions. I understand and acknowledge receipt of the instructions indicated above. Physician's or R.N.'s Signature                                                                  Date/Time                                                                                                                                              Patient or Representative Signature                                                          Date/Time

## 2022-08-15 NOTE — PROGRESS NOTES
Reason for Admission:  respiratory failure, hyponatremia, covid+                     RUR Score:    9%                 Plan for utilizing home health:      none    PCP: First and Last name:  Sharonda Farnsworth NP     Name of Practice:    Are you a current patient: Yes/No: yes   Approximate date of last visit: November 2021   Can you participate in a virtual visit with your PCP:  pt doesn't know                    Current Advanced Directive/Advance Care Plan: Full Code  Has one at home, not on file    Healthcare Decision Maker:   Click here to complete [place] including selection of the [place] Relationship (ie \"Primary\")           Vassie Schilder, son, [phone]                     Transition of Care Plan:                    Spoke with pt by phone for d/c planning. Pt lives by herself in a 1 story house with no entry steps. She stated she is independent with ADL's, drives and walks unaided. She has no DME at home. Medications are from [facility] and [facility] at [address] [facility] and by mail. Pt is being followed for medical management  No O2 requirement  Family to transport home at d/c  CM following  Pt to follow up OP    Care Management Interventions  PCP Verified by CM: Yes  Mode of Transport at Discharge: Other (see comment)  Support Systems: Child(azael), Other Family Member(s)  Confirm Follow Up Transport: Family  Discharge [facility]  Patient Expects to be Discharged to[de-identified] Home  XENIA Helms

## 2022-08-15 NOTE — ED PROVIDER NOTES
Is a 73-year-old female present emergency department for nausea, diarrhea. Patient states that she has had symptoms for the last 3 days. Patient seen her PCP yesterday was given a prescription for Zofran states that is not helping. She says that she has been having constant diarrhea, loose stools for 3 weeks. Patient took a home test for COVID yesterday and that was positive. Patient has not vaccinated for COVID after finding out that she was positive for COVID she took indomethacin for livestock. She denies any shortness of breath, chills or on arrival patient was noted to be 91% on room air not in any apparent respiratory distress.        Past Medical History:   Diagnosis Date    Hypertension     Leukemia ([facility] [address]        Past Surgical History:   Procedure Laterality Date    HX BONE MARROW TRANSPLANT      HX BUNIONECTOMY      HX CHOLECYSTECTOMY      HX ORTHOPAEDIC           Family History:   Problem Relation Age of Onset    Hypertension Mother     Diabetes Father     Breast Cancer Sister 62       Social History     Socioeconomic History    Marital status: SINGLE     Spouse name: Not on file    Number of children: Not on file    Years of education: Not on file    Highest education level: Not on file   Occupational History    Not on file   Tobacco Use    Smoking status: Former    Smokeless tobacco: Never   Substance and Sexual Activity    Alcohol use: No    Drug use: No    Sexual activity: Never   Other Topics Concern    Not on file   Social History Narrative    Not on file     Social Determinants of Health     Financial Resource Strain: Not on file   Food Insecurity: Not on file   Transportation Needs: Not on file   Physical Activity: Not on file   Stress: Not on file   Social Connections: Not on file   Intimate Partner Violence: Not on file   Housing Stability: Not on file         ALLERGIES: Codeine, Erythromycin, and Macrobid [nitrofurantoin monohyd/m-cryst]    Review of Systems   Constitutional:  Positive for activity change, appetite change and fatigue. Gastrointestinal:  Positive for diarrhea and nausea. All other systems reviewed and are negative. Vitals:    08/14/22 2231   BP: 134/79   Pulse: 73   Resp: 18   Temp: 97.9 °F (36.6 °C)   SpO2: 92%   Weight: 87.5 kg (193 lb)   Height: 5' 3\" (1.6 m)            Physical Exam  Vitals and nursing note reviewed. Constitutional:       Appearance: She is ill-appearing. HENT:      Head: Normocephalic and atraumatic. Eyes:      Extraocular Movements: Extraocular movements intact. Pupils: Pupils are equal, round, and reactive to light. Cardiovascular:      Rate and Rhythm: Normal rate and regular rhythm. Pulses: Normal pulses. Heart sounds: Normal heart sounds. Pulmonary:      Effort: Pulmonary effort is normal.      Breath sounds: Normal breath sounds. Abdominal:      Tenderness: There is generalized abdominal tenderness. Musculoskeletal:         General: Normal range of motion. Cervical back: Normal range of motion and neck supple. Skin:     General: Skin is warm and dry. Neurological:      General: No focal deficit present. Mental Status: She is alert and oriented to person, place, and time. Psychiatric:         Mood and Affect: Mood is anxious. MDM  Number of Diagnoses or Management Options  COVID  Hyponatremia  Hypoxia  Diagnosis management comments: Hyponatremia, hypoxia secondary to COVID. 31-year-old female presenting the emergency department recent diagnosis of COVID patient noted to be hypoxic on room air will place patient on nasal cannula for comfort patient does not appear to be in any respiratory distress labs show hyponatremia with sodium of 122. Based off of this patient require admission for electrolyte correction.        Amount and/or Complexity of Data Reviewed  Clinical lab tests: reviewed  Tests in the radiology section of CPT®: reviewed           Procedures        Perfect Serve Consult for Admission  1:06 AM    ED Room Number: C06/C06  Patient Name and age:  Gemma Ornelas 80 y.o.  female  Working Diagnosis:   1. Hyponatremia    2. COVID    3. Hypoxia        COVID-19 Suspicion:  yes  Sepsis present:  no  Reassessment needed: no  Code Status:  Full Code  Readmission: no  Isolation Requirements:  yes  Recommended Level of Care:  telemetry  Department:[place] [facility]  Other: Total critical care time (not including time spent performing separately reportable procedures): 33 min.

## 2022-08-15 NOTE — H&P
[address] [facility] [address] [place] [address]   Office [phone]  Fax [phone]       Admission H&P     Name: Phani Norton MRN: [id number]  Sex: Female   YOB: 1950  Age: 80 y.o. PCP: Jonatan Dawson NP     Source of Information: patient, medical records    Chief complaint: nausea and diarrhea, Covid Positive     History of Present Illness  Perri Winston is a 80 y.o. female with PMH of pre-DM, HTN, HLD, Hx of Leukemia s/p bone marrow transplant, meningioma (stable), and obesity, who presents as a direct admit from [facility] ED. She complains of a 3 day history of nausea and diarrhea. She noted a cough on 8/10 as well as chills, and then she tested + for covid on 8/12 (unvaccinated). She then took Ivermectin for her Covid as recommended by a friend. Two hours after the ivermectin she states she started having watery diarrhea and nausea, which persisted for three days. Endorsing 8-10 watery BM daily, non bloody. She called her PCP who prescribed Zofran 8 mg in the outpatient setting but denies any improvement of nausea, still no vomiting however. She states the diarrhea resolved yesterday morning, but the persistent nausea caused her to present to the ED. Symptoms started on 8/10. Covid vaccination status Non-vaccinated. No recent antibiotic use or recent travel NO.        In the ER:      Vitals:  Patient Vitals for the past 8 hrs:   Temp Pulse Resp BP SpO2   08/15/22 0801 -- 70 -- -- --   08/15/22 0755 97.8 °F (36.6 °C) 70 16 (!) 143/74 98 %   08/15/22 0301 -- 66 -- (!) 135/94 91 %   08/15/22 0231 -- 72 -- (!) 160/90 96 %   08/15/22 0201 -- 70 -- (!) 144/71 94 %   08/15/22 0131 -- 71 -- (!) 149/80 94 %         Labs:   Recent Labs     08/14/22  2229   WBC 6.3   HGB 14.1   HCT 41.1        Recent Labs     08/15/22  0256 08/14/22  2229   * 122*   K 4.0 4.6   CL 92* 87*   CO2 20* 21*   BUN 11 13   CREA 0.71 0.76   * 115*   CA 9.4 9.9     Recent Labs     08/14/22 2229   AP 73   TP 7.5   ALB 4.2   GLOB 3.3     No results for input(s): INR, PTP, APTT, INREXT, INREXT in the last 72 hours. No results for input(s): PHI, PCO2I, PO2I, FIO2I in the last 72 hours. No results for input(s): CPK, CKMB, TROIQ, BNPP in the last 72 hours. Imaging:   CXR:  CXR Results  (Last 48 hours)                 08/14/22 2246  XR CHEST PA LAT Final result    Impression:  Left basilar subsegmental atelectasis       Narrative:  EXAM: XR CHEST PA LAT       INDICATION: covid +       COMPARISON: None. FINDINGS: PA and lateral radiographs of the chest demonstrate a linear opacity   at the left lung base. The cardiac and mediastinal contours and pulmonary   vascularity are remarkable for mild tortuosity of the descending aorta. The   bones and soft tissues are within normal limits. CT:   CT Results  (Last 48 hours)      None            Treatment: S/p 1L NS, Zofran        Review of Systems   Review of Systems   Constitutional:  Positive for appetite change, chills and fatigue. Negative for diaphoresis and fever. HENT:  Negative for congestion, rhinorrhea and sneezing. Eyes:  Negative for pain and visual disturbance. Respiratory:  Positive for cough. Negative for apnea, chest tightness, shortness of breath and wheezing. Cardiovascular:  Negative for chest pain and palpitations. Gastrointestinal:  Positive for abdominal pain, diarrhea and nausea. Negative for abdominal distention, blood in stool, constipation and vomiting. Endocrine: Negative for polyphagia and polyuria. Genitourinary:  Negative for dysuria, frequency and hematuria. Musculoskeletal:  Negative for back pain and myalgias. Skin:  Negative for rash and wound. Home Medications   Prior to Admission medications    Medication Sig Start Date End Date Taking? Authorizing Provider   bisoprolol-hydroCHLOROthiazide [facility]) 5-6.25 mg per tablet Take 1 Tablet by mouth daily.  7/15/22  Yes Shavonne Emerson NP   cholecalciferol (VITAMIN D3) 25 mcg (1,000 unit) cap Take 2,000 Units by mouth daily. Yes Provider, Historical   ondansetron (ZOFRAN ODT) 8 mg disintegrating tablet Take 1 Tablet by mouth every eight (8) hours as needed for Nausea or Vomiting for up to 5 days. 8/13/22 8/18/22  Shavonne Emerson NP   atorvastatin (LIPITOR) 40 mg tablet Take 1 Tablet by mouth daily. Patient taking differently: Take 20 mg by mouth every other day. 7/15/22   Shavonne Emerson NP   fenofibrate micronized (LOFIBRA) 134 mg capsule Take 1 capsule by mouth in the morning 7/15/22   Shavonne Emerson NP   Ramond Shank, Quercetin-bid    Provider, Historical   ZINC PO Take  by mouth. Provider, Historical   ascorbic acid (VITAMIN C PO) Take  by mouth. Provider, Historical   melatonin 3 mg tablet Take  by mouth. Provider, Historical   D-MANNOSE PO Take  by mouth. Takes 6 caps daily    Provider, Historical   OTHER Drinks Lemon in 64 oz of water daily. Provider, Historical   UBIDECARENONE (ULTRA COQ10 PO) Take 200 mg by mouth. Provider, Historical   conjugated estrogens (PREMARIN) 0.625 mg/gram vaginal cream Apply 0.5 g to affected area daily. 11/30/17   Antoni Paul MD   B.animalis,bifid,infantis,long 10-15 mg TbEC Take  by mouth. Provider, Historical   cinnamon bark 500 mg cap TID 8/23/17   Antoni Paul MD   Blood-Glucose Meter monitoring kit Please check blood sugar once daily. E11.9 Please provide with a freestyle product. 6/14/17   Antoni Paul MD   DOCOSAHEXANOIC ACID/EPA (FISH OIL PO) Take 1,200 mg by mouth daily. Twice daily    Provider, Historical   aspirin delayed-release 81 mg tablet Take  by mouth daily.   Patient not taking: Reported on 8/15/2022    Provider, Historical       Allergies  Allergies   Allergen Reactions    Codeine Nausea and Vomiting    Erythromycin Nausea and Vomiting    Macrobid [Nitrofurantoin Monohyd/M-Cryst] Diarrhea and Nausea and Vomiting       Past Medical History  Past Medical History:   Diagnosis Date    Hypertension     Leukemia ([facility] [address]         Previous Hospitalization(s)  Past Surgical History:   Procedure Laterality Date    HX BONE MARROW TRANSPLANT      HX BUNIONECTOMY      HX CHOLECYSTECTOMY      HX ORTHOPAEDIC         Social History  Social History     Socioeconomic History    Marital status: SINGLE     Spouse name: Not on file    Number of children: Not on file    Years of education: Not on file    Highest education level: Not on file   Occupational History    Not on file   Tobacco Use    Smoking status: Former    Smokeless tobacco: Never   Substance and Sexual Activity    Alcohol use: No    Drug use: No    Sexual activity: Never   Other Topics Concern    Not on file   Social History Narrative    Not on file     Social Determinants of Health     Financial Resource Strain: Not on file   Food Insecurity: Not on file   Transportation Needs: Not on file   Physical Activity: Not on file   Stress: Not on file   Social Connections: Not on file   Intimate Partner Violence: Not on file   Housing Stability: Not on file       Family History  Family History   Problem Relation Age of Onset    Hypertension Mother     Diabetes Father     Breast Cancer Sister 62          Patient resides   x  Independently      With family care      Assisted living      SNF      Ambulates  x  Independently      With cane       Assisted walker      Alcohol history   x  None     Social     Chronic     Smoking history    None   x  Former smoker, quit 20 years ago     Current smoker     Drug history  x  None     Former drug user     Current drug user     Code status  x  Full code     DNR/DNI     Partial      Code status discussed with the patient/caregivers. Physical Exam  General: No acute distress. Alert. Cooperative. Head: Normocephalic. Atraumatic. Eyes:              Conjunctiva pink. Sclera white. PERRL. Throat: Dry mucous membranes.  No tonsillar exudates or erythema. Neck: No JVD. No lymphadenopathy. Respiratory: CTAB. No w/r/r/c. No accessory muscle use. Cardiovascular: Regular Rhythm. No m/r/g.    GI: + bowel sounds. Mild epigastric tenderness. No rebound/guarding. Non-distended. Extremities: No LE edema. Distal pulses intact. Musculoskeletal: Full ROM in all extremities. Skin: Warm, dry. No rashes. Neuro: Alert and oriented. No focal deficits. Strength 5/5 in all extremities. Assessment and Plan     Perri Nettles is a 80 y.o. female with PMH of pre-DM, HTN, HLD, Hx of Leukemia s/p bone marrow transplant, meningioma (stable), and obesity who is admitted for hypoNA. Hypovolemic Hyponatremia: Likely secondary to GI losses. Na 122 (8/14)-> 126 (8/15). Correcting with fluids , baseline ~ 140. Less likely SIADH in the setting of Covid (less likely as improved with fluids) or hypothyroidism given new TSH elevated at 7. Received 1L bolus in ED.  - Admit to medical, vitals per protocol   -  MIVF  mL/hr   - Follow up urine lytes   - CMP q6hr   - EKG to check QTC and then treat nausea aggressively with appropriate antinausea meds  - Goal to correct Na ~8 meq in 24 hrs (<135)   - If correcting rapidly with next BMP, consider switching NS to LR   - follow up TSH outpatient, possibly elevated in setting of acute viral illness. COVID+: Symptom onset 8/10. Test positive 8/12. Unvaccinated. No O2 requirement. - Continue to monitor pulse ox.   - No further labs or steroid treatment at this time given no O2 requirement  - Supportive care       Transaminitis: ALT 78, . Likely 2/2 livestock Ivermectin she took for COVID .   - Continue to trend     Diarrhea: Likely secondary to ivermectin vs covid infection. No recent abx use. Does not meet sepsis criteria. Symptoms resolved.   - strict I/Os    Pre-DM: Last HgA1C   Lab Results   Component Value Date/Time    Hemoglobin A1c 5.8 (H) 10/25/2021 08:02 AM   - Life style medications advised     Hypertension: Chronic     - HOLD HCTZ 6.25 mg QD given low Na  - Sub Bisoprolol 5 to Metoprolol 50 mg BID  - Will continue to monitor at this time and readjust as BP's trend. Hyperlipidemia:Last  Lab Results   Component Value Date/Time    Cholesterol, total 114 08/15/2022 02:56 AM    HDL Cholesterol 42 08/15/2022 02:56 AM    LDL, calculated 28.6 08/15/2022 02:56 AM    VLDL, calculated 43.4 08/15/2022 02:56 AM    Triglyceride 217 (H) 08/15/2022 02:56 AM    CHOL/HDL Ratio 2.7 08/15/2022 02:56 AM    On COQ10.   - Continue home Atorvastatin 40 mg every day. - Hold Fenofibrate 134 mg. Hx of Leukemia: s/p bone [place] [facility] [place] Stable, no current follow up or monitoring.   - anemia labs     Meningioma: stable on imaging from 11/18/2021 (compared to CT head on 6/3/2019). 7 mm calcified meningioma left middle cranial fossa. PCP advised serial imaging. Obesity: Body mass index is 34.09 kg/m². - Encouraging lifestyle modifications and further follow up outpatient. Supplements: takes the following home medications:  fish oil, melatonin 3 mg, Probiotic, Vitamin C, vitamin D3 2000 u, cinnamon bark d-manose, Quercetin, Vitamin D deficiency, zinc)      FEN/GI - Regular diet. NS at 125 mL/hr. Activity - Ambulate as tolerated with assist.   DVT prophylaxis - Lovenox  GI prophylaxis - Not indicated at this time  Fall prophylaxis - Not indicated at this time. Disposition - Admit to Telemetry. Plan to d/c to TBD. PT/OT  Code Status - Full, discussed with patient / caregivers.   Next of Kin Name and Contact : Erica Caruso (daughter in law)      Patient Miguelina Calzada will be discussed with Dr. Cale Sharp.    2:41 AM, 08/15/22  Nabila Jiménez MD  Family Medicine Resident       For Billing    Chief Complaint   Patient presents with    Positive For Covid-19    Diarrhea    Nausea       Hospital Problems  Date Reviewed: 10/25/2021            Codes Class Noted POA    * (Principal) Hyponatremia ICD-10-CM: E87.1  ICD-9-CM: 276.1  8/15/2022 Unknown        COVID ICD-10-CM: U07.1  ICD-9-CM: 079.89  8/15/2022 Unknown        Meningioma ([facility] [address] ICD-10-CM: D32.9  ICD-9-CM: 225.2  8/15/2022 Yes    Overview Signed 8/15/2022  2:34 AM by Hilda Harden MD     stable on imaging from 2021             Bone marrow transplant status ([facility] [address] ICD-10-CM: Z94.81  ICD-9-CM: V42.81  3/15/2018 Yes        Prediabetes ICD-10-CM: R73.03  ICD-9-CM: 790.29  11/30/2017 Yes        Elevated cholesterol ICD-10-CM: E78.00  ICD-9-CM: 272.0  11/30/2017 Yes

## 2022-08-15 NOTE — PROGRESS NOTES
TRANSFER - IN REPORT:    Verbal report received from Esther Sanford RN(name) on Perri Nettles  being received from [facility](unit) for routine progression of care      Report consisted of patients Situation, Background, Assessment and   Recommendations(SBAR). Information from the following report(s) SBAR, Kardex, Intake/Output, MAR, and Recent Results was reviewed with the receiving nurse. Opportunity for questions and clarification was provided.

## 2022-08-15 NOTE — PROGRESS NOTES
0730 Bedside and Verbal shift change report given to April RN (oncoming nurse) by Geri Lakhani RN (offgoing nurse). Report included the following information SBAR and Kardex. This patient was assisted with Intentional Toileting every 2 hours during this shift as appropriate. Documentation of ambulation and output reflected on Flowsheet as appropriate. Purposeful hourly rounding was completed using AIDET and 5Ps. Outcomes of PHR documented as they occurred. Bed alarm in use as appropriate. Dual Suction and ambubag in place. 3245 patient arrived to room. 0830 notified Eunice Connor family practice resident patient would like compazine for nausea not zofran. 5427 Patient had loose stool, would like MD notified, notified family practice resident, no orders noted, ok for discharge. [phone] Patient reviewed discharge instructions, verbalized understanding, no questions. Patient calling for ride now.

## 2022-08-15 NOTE — PROGRESS NOTES
I saw and evaluated the patient, performing the key elements of the service. I discussed the findings, assessment and plan with the resident and agree with the resident's findings and plan as documented in the resident's note. 68yo F with Covid infection (sx started 8/10), who took Ivermectin and developed diarrhea 8-10x/day, nausea, no vomiting. Hyponatremia: likely due to diarrhea 2/2 Ivermectin v Covid-19 infection. Other etiology could be SIADH from Covid-19 v thyroid disease.  Na trending up with IVF, now at 126, repeat pending  Covid-19: no O2 requirement, does not meet inpatient requirements for this

## 2022-08-16 ENCOUNTER — PATIENT OUTREACH (OUTPATIENT)
Dept: CASE MANAGEMENT | Age: 72
End: 2022-08-16

## 2022-08-16 LAB
ATRIAL RATE: 70 BPM
CALCULATED P AXIS, ECG09: 35 DEGREES
CALCULATED R AXIS, ECG10: -5 DEGREES
CALCULATED T AXIS, ECG11: 36 DEGREES
DIAGNOSIS, 93000: NORMAL
P-R INTERVAL, ECG05: 192 MS
Q-T INTERVAL, ECG07: 402 MS
QRS DURATION, ECG06: 80 MS
QTC CALCULATION (BEZET), ECG08: 434 MS
VENTRICULAR RATE, ECG03: 70 BPM

## 2022-08-16 NOTE — PROGRESS NOTES
22     Patient contacted regarding COVID-19 diagnosis. Discussed COVID-19 related testing which was available at this time. Test results were positive. Patient informed of results, if available? yes. Care Transition Nurse contacted the patient by telephone to perform post discharge assessment. Call within 2 business days of discharge: Yes Verified name and  with patient as identifiers. Provided introduction to self, and explanation of the CTN/ACM role, and reason for call due to risk factors for infection and/or exposure to COVID-19. Symptoms reviewed with patient who verbalized the following symptoms: fatigue, cough, no new symptoms, and no worsening symptoms. Also, reports \"brain fog. \"      Due to no new or worsening symptoms encounter was not routed to provider for escalation. Discussed follow-up appointments. If no appointment was previously scheduled, appointment scheduling offered:  no, pt would like to see her usual PCP and will call office to cancel VV and see what options are for in-person visit with Ms. Edd Hardy. 1215 Mckenzie Cardona follow up appointment(s):   Future Appointments   Date Time Provider Nallely Apodaca   2022 10:30 AM Baldev Trujillo NP [facility]     Non-[facility] follow up appointment(s): none known    Interventions to address risk factors: Scheduled appointment with PCP-as above     Advance Care Planning:   Does patient have an Advance Directive: decision makers updated. Pt does not have ACP on file at this time. If I am able, I will ask on next call if she has one or has interest in discussing this with one of our ACP facilitators. Primary Decision Maker: Radha Obrien - [place] - [phone]    Secondary Decision Maker: Fatmata Knott - Daughter-in-Law - [phone]    Supplemental (Other) Decision Maker: Dao Love - [phone]     CTN reviewed discharge instructions, medical action plan and red flag symptoms with the patient who verbalized understanding.  Discussed COVID vaccination status: yes, pt states she has not been vaccinated at this time. Discussed exposure protocols and quarantine with CDC Guidelines. Patient was given an opportunity to verbalize any questions and concerns and agrees to contact CTN or health care provider for questions related to their healthcare. Reviewed and educated patient on any new and changed medications related to discharge diagnosis. Pt states she has obtained the famotidine and prochlorperazine ordered at discharge and has not been taking ASA delayed-release for a long time. She asks if she can drink Mg-infused water and I advised her to check with her PCP as she did not have a Mg level drawn in hospital and her level was normal in 10/2021. Advised that she should be getting Mg in her diet if she eats a variety of foods. She agrees to call and ask. Was patient discharged with a pulse oximeter? no    CTN provided contact information. Plan for follow-up call in 5-7 days based on severity of symptoms and risk factors.      Jeanie Shah DNP, FNP-C, Care Transitions Team, ) [phone]

## 2022-08-17 ENCOUNTER — TELEPHONE (OUTPATIENT)
Dept: FAMILY MEDICINE CLINIC | Age: 72
End: 2022-08-17

## 2022-08-17 NOTE — TELEPHONE ENCOUNTER
Patient called and needs a hospital follow up next week. She has a virtual with Milton Watters but needs in office to get her sodium checked. She would like to know if she can be double booked in an early morning slot. Please call her back at [phone]. She also would like Sandy Galvan to know that she rescheduled her colonoscopy for 11/04/2022.

## 2022-08-18 NOTE — TELEPHONE ENCOUNTER
Will have to discuss with provider if able to work pt in [facility] and when would be appropriate time. Completely booked and provider is coming back from vacation on requested week.

## 2022-08-19 ENCOUNTER — PATIENT OUTREACH (OUTPATIENT)
Dept: CASE MANAGEMENT | Age: 72
End: 2022-08-19

## 2022-08-19 NOTE — PROGRESS NOTES
Physician Progress Note      PATIENT:               Omi Weinstein  CSN #:                  [id number]  :                       1950  ADMIT DATE:       2022 10:15 PM  [address] DATE:        8/15/2022 4:09 PM  RESPONDING  PROVIDER #:        Daniel Landrum MD          QUERY TEXT:    Pt admitted with COVID infection, Pt noted to have Hyponatremia, SIADH, Thyroid disease, diarrhea due to Ivermectin documented. Please document in progress notes and discharge summary the relationship, if any, between Hyponatremia and  SIADH, Thyroid disease, diarrhea due to Ivermectin. The medical record reflects the following:  Risk Factors: Hyponatremia    Clinical Indicators:  H&P  Hypovolemic Hyponatremia: Likely secondary to GI losses. Na 122 ()-> 126 (8/15). Correcting with fluids , baseline    140. Less likely SIADH in the setting of Covid (less likely as improved with fluids) or hypothyroidism given new TSH elevated at 7. Received 1L bolus in ED. OB Consultant PN 8/15:  1. Hyponatremia: likely due to diarrhea 2/2 Ivermectin v Covid-19 infection. Other etiology could be SIADH from Covid-19 v thyroid disease. Na trending up with IVF, now at 126, repeat pending  2. Covid-19: no O2 requirement, does not meet inpatient requirements for this    Treatment: Monitoring  Options provided:  -- Hyponatremia due to COVID  -- Hyponatremia unrelated to COVID  -- Hyponatremia due to SIADH  -- Hyponatremia unrelated to SIADH  -- Hyponatremia due to Thyroid disease  -- Hyponatremia unrelated to Thyroid disease  -- Hyponatremia due to diarrhea due to drugs  -- Hyponatremia unrelated to diarrhea due to drugs  -- Other - I will add my own diagnosis  -- Disagree - Not applicable / Not valid  -- Disagree - Clinically unable to determine / Unknown  -- Refer to Clinical Documentation Reviewer    PROVIDER RESPONSE TEXT:    This patient has Hyponatremia due to diarrhea due to drugs.     Query created by: Lauryn Lopez on 8/18/2022 10:40 AM      Electronically signed by:  Milo Brennan MD 8/19/2022 5:21 AM

## 2022-08-19 NOTE — PROGRESS NOTES
08/19/22     Pt called to discuss her options regarding F/U visit since she is eager to return to work on Monday and won't be able to have the VV at her PCP office on that day. She wants to know her Na level and confirm that it is safe for her to go back to work. She tells me she is on day 9 of her quarantine and has been feeling better. I advised the [facility] guidance gives her the option now to be out in public with a mask on. However, I also advised being seen at urgent care will depend on their rules and she will probably be asked screening questions when she arrives. I also suggested she could have F/U visit by Pasha Perry but she would prefer to go out for this visit. I checked into options to [facility] and called the [address] [place] [address]. location. Was advised this is the only [facility] in [facility] now that is available to the public. Pt states this is too far a drive for her to take but that she has a [facility] near her and will go there today. I wished her the best, advised I'll place a final F/U call on Tuesday afternoon, and we disconnected.     Jeanie Shah DNP, FNP-C, Care Transitions Team, (Ph) [phone]

## 2022-08-22 NOTE — TELEPHONE ENCOUNTER
Spoke with pt stated she went to urgent care on Friday-[facility] med to have sodium levels checked. Still awaiting results. Advised to send Jose Cruz Son with results via [employer] once she has received them. Will also send pt medical release form via email to retrieve records. Pt stated she understood and agreed.

## 2022-08-23 ENCOUNTER — TELEPHONE (OUTPATIENT)
Dept: FAMILY MEDICINE CLINIC | Age: 72
End: 2022-08-23

## 2022-08-23 ENCOUNTER — PATIENT OUTREACH (OUTPATIENT)
Dept: CASE MANAGEMENT | Age: 72
End: 2022-08-23

## 2022-08-23 NOTE — TELEPHONE ENCOUNTER
----- Message from Blayne Wallace sent at 8/23/2022 11:45 AM EDT -----  Subject: Message to Provider    QUESTIONS  Information for Provider? Pt is calling to see if her medical release form   was received as well as checking on her labs, pls give the pt a call. Pt   stated that when she sent the email back but it didn't go through, so she   faxed it to both numbers on the form. Pt stated that she can also go to   [facility] and give them the medical release, if she needs to. Pls give   the pt a call.  ---------------------------------------------------------------------------  --------------  Jamison QUEZADA  [phone]; OK to leave message on voicemail  ---------------------------------------------------------------------------  --------------  SCRIPT ANSWERS  Relationship to Patient?  Self

## 2022-08-23 NOTE — PROGRESS NOTES
08/23/22     Patient resolved from [address] episode on 8/23/22. Discussed COVID-19 related testing which was available at this time. Test results were positive. Patient informed of results, if available? yes     Patient/family has been provided the following resources and education related to COVID-19:                         Signs, symptoms and red flags related to COVID-19            CDC exposure and quarantine guidelines            [employer] exposure contact - [phone]            Contact for their local Department of Health                 Patient currently reports that the following symptoms have improved: Patient reports she is slowly improving. She states she has a new cough that has kept her up at night for the last two nights and has been experiencing loss of taste for the first time, but still able to eat. She confirms that she went to urgent care on Friday and they daphnie labs but she tells me they do not communicate automatically with [facility]. She was given a permission to release information form which she tells me she has completed and sent back to them, but hasn't received an update with lab results yet from her PCP's office. I suggested she F/U with urgent care to make sure they received her form and have released her results to PCP. She agrees to do this. She denies having any other questions or needing any further assistance at this time. I thanked her for the update, advised this is my final call and we disconnected. No further outreach scheduled with this CTN/ACM/LPN/HC/ MA. Episode of Care resolved. Patient has this CTN/ACM/LPN/HC/MA contact information if future needs arise.

## 2022-08-23 NOTE — TELEPHONE ENCOUNTER
Faxed medical release form to [facility] location @ [phone]. Confirmation number [id number]. Original form placed in scan folder for central scanning.

## 2022-08-29 ENCOUNTER — TELEPHONE (OUTPATIENT)
Dept: FAMILY MEDICINE CLINIC | Age: 72
End: 2022-08-29

## 2022-08-29 NOTE — TELEPHONE ENCOUNTER
Spoke with pt advised have not received urgent care report,advised to print off and bring to appt. Pt stated she understood.

## 2022-08-29 NOTE — TELEPHONE ENCOUNTER
----- Message from Madelin Solorzano sent at 8/29/2022 11:32 AM EDT -----  Subject: Message to Provider    QUESTIONS  Information for Provider? Patient is requesting if Nurse Jadon Escobar could give   her a call regarding trying to retrieve information from [facility] for   lab work that the patient had preformed on 8/19/2022. [facility] did email   her this information and she is at work right now and did print this off. Patient would like to know if she should bring this to her appointment on   Thursday this week with Nurse Jani St if she has not already received   this.  Please advise.   ---------------------------------------------------------------------------  --------------  Misael Dupont XF  [phone]; OK to leave message on voicemail  ---------------------------------------------------------------------------  --------------  SCRIPT ANSWERS  undefined

## 2022-09-01 ENCOUNTER — OFFICE VISIT (OUTPATIENT)
Dept: FAMILY MEDICINE CLINIC | Age: 72
End: 2022-09-01
Payer: MEDICARE

## 2022-09-01 VITALS
RESPIRATION RATE: 18 BRPM | DIASTOLIC BLOOD PRESSURE: 77 MMHG | SYSTOLIC BLOOD PRESSURE: 120 MMHG | TEMPERATURE: 98.7 F | WEIGHT: 191 LBS | HEART RATE: 73 BPM | OXYGEN SATURATION: 97 % | BODY MASS INDEX: 33.84 KG/M2 | HEIGHT: 63 IN

## 2022-09-01 DIAGNOSIS — E87.1 HYPONATREMIA: Primary | ICD-10-CM

## 2022-09-01 DIAGNOSIS — J06.9 URI WITH COUGH AND CONGESTION: ICD-10-CM

## 2022-09-01 DIAGNOSIS — Z86.16 HISTORY OF COVID-19: ICD-10-CM

## 2022-09-01 PROCEDURE — G8427 DOCREV CUR MEDS BY ELIG CLIN: HCPCS | Performed by: NURSE PRACTITIONER

## 2022-09-01 PROCEDURE — G8536 NO DOC ELDER MAL SCRN: HCPCS | Performed by: NURSE PRACTITIONER

## 2022-09-01 PROCEDURE — 99214 OFFICE O/P EST MOD 30 MIN: CPT | Performed by: NURSE PRACTITIONER

## 2022-09-01 PROCEDURE — 1111F DSCHRG MED/CURRENT MED MERGE: CPT | Performed by: NURSE PRACTITIONER

## 2022-09-01 PROCEDURE — G8399 PT W/DXA RESULTS DOCUMENT: HCPCS | Performed by: NURSE PRACTITIONER

## 2022-09-01 PROCEDURE — 1101F PT FALLS ASSESS-DOCD LE1/YR: CPT | Performed by: NURSE PRACTITIONER

## 2022-09-01 PROCEDURE — 1090F PRES/ABSN URINE INCON ASSESS: CPT | Performed by: NURSE PRACTITIONER

## 2022-09-01 PROCEDURE — 1123F ACP DISCUSS/DSCN MKR DOCD: CPT | Performed by: NURSE PRACTITIONER

## 2022-09-01 PROCEDURE — 3017F COLORECTAL CA SCREEN DOC REV: CPT | Performed by: NURSE PRACTITIONER

## 2022-09-01 PROCEDURE — G8432 DEP SCR NOT DOC, RNG: HCPCS | Performed by: NURSE PRACTITIONER

## 2022-09-01 PROCEDURE — G9899 SCRN MAM PERF RSLTS DOC: HCPCS | Performed by: NURSE PRACTITIONER

## 2022-09-01 PROCEDURE — G8417 CALC BMI ABV UP PARAM F/U: HCPCS | Performed by: NURSE PRACTITIONER

## 2022-09-01 NOTE — PROGRESS NOTES
Chief Complaint   Patient presents with    Hospital Follow Up     Patient in office today for hospital follow up. Pt was seen at [facility] on 1/12 due to complications from covid. Pt took at home test-positive result. During hospital stay was noted to have low sodium levels. Pt does note when covid test was positive. Pt took 1/2 tsp of ivermectin horse paste. Resulted in severe diarrhea and nausea. Since being discharged have since [facility]-brought in report. Nausea and diarrhea has improved. Cough has is present. Cough is productive-secretions thick and white. Have been treating with mucinex-dm max q4hrs since Saturday. Pt has since decreased usage to only in afternoon. Pt has been drinking pedialyte daily. Sodium was 135 when rechecked by [facility]. Denies any other concerns at this time. Chief Complaint   Patient presents with    Hospital Follow Up     she is a 71y.o. year old female who presents for evalution. Reviewed PmHx, RxHx, FmHx, SocHx, AllgHx and updated and dated in the chart.     Review of Systems - negative except as listed above in the HPI    Objective:     Vitals:    09/01/22 1508   BP: 120/77   Pulse: 73   Resp: 18   Temp: 98.7 °F (37.1 °C)   TempSrc: Oral   SpO2: 97%   Weight: 191 lb (86.6 kg)   Height: 5' 3\" (1.6 m)     Physical Examination: General appearance - alert, well appearing, and in no distress  Mental status - normal mood, behavior  Eyes - pupils equal and reactive, extraocular eye movements intact  Ears - bilateral TM's and external ear canals normal  Nose - normal and patent, no erythema, discharge or polyps and normal nontender sinuses  Mouth - mucous membranes moist, pharynx normal without lesions  Neck - supple, no significant adenopathy  Chest - clear to auscultation, no wheezes, rales or rhonchi, symmetric air entry  Heart - normal rate, regular rhythm, normal S1, S2, no murmurs  Extremities - peripheral pulses normal, no edema, no clubbing or cyanosis  Skin - normal coloration and turgor    Assessment/ Plan:   Diagnoses and all orders for this visit:    1. Hyponatremia  -     METABOLIC PANEL, COMPREHENSIVE; Future  Sodium was normal when rechecked at [facility] at 135 on 8/19. Has still been pushing pedialyte and eating a lot of salty foods. Diarrhea has completely resolved so pt should no longer require increased electrolytes but will deviate plan based on repeat   2. History of COVID-19  Doing MUCH better, just the occasional SOB but based on our discussion sounds like it could be more r/t anxiety. Continue to monitor and reviewed deep breathing exercises along with meditation. 3. URI with cough and congestion  Okay to continue the mucinex to dry things up. Recommended pt take medication for another 2-3 weeks but 2-3 times a day versus every 4 hours and then stop. If sx return, reach out to discuss. I have discussed the diagnosis with the patient and the intended plan as seen in the above orders. The patient has received an after-visit summary and questions were answered concerning future plans. Medication Side Effects and Warnings were discussed with patient: yes  Patient Labs were reviewed and or requested: yes  Patient Past Records were reviewed and or requested  yes  Patient / Caregiver Understanding of treatment plan was verbalized during office visit YES    TOMMY Das    There are no Patient Instructions on file for this visit.

## 2022-09-01 NOTE — DISCHARGE SUMMARY
Discharge / Transfer / Off-Service Note     Name: Dora Carpenter MRN: [id number]  Sex: Female   YOB: 1950  Age: 71 y.o. PCP: Anish Hartmann NP     Date of admission: 8/14/2022  Date of discharge/transfer: 8/15/22    Attending physician at admission: Gabrielle Tyler.  Attending physician at discharge/transfer: No att. providers found  Resident physician at discharge/transfer: Carlene Banks MD     Consultants during hospitalization  None     Admission diagnoses   Respiratory failure, acute ([address] [J96.00]  COVID [U07.1]  Hyponatremia [E87.1]    Recommended follow-up after discharge    1. PCP-Betito Swan NP     Things to follow up on with PCP:   Please follow up with your PCP regarding:  - Resolution of your low sodium and elevated liver enzymes with a repeat CMP.  - COVID symptoms resolution  - Repeat thyroid function studies for your elevated TSH   ----------------------------------------------------------------------------------------------------------------------------  The patient was well enough to be discharged from the hospital. However, because they were inpatient in a hospital, they are at greater risk of having been exposed to the coronavirus. PLEASE stay inside and self-quarantine for 14 days to prevent further spread of the coronavirus.  ------------------------------------------------------------------------------------------------------------------    Medication Changes:  START   START  Zofran (ondansetron): place one dissolvable tablet (8mg) under the tongue as needed every 8 hours for nausea  Compazine (prochlorperazine): take one tablet (5mg) every 6 hours as needed for nausea  Pepcid (famotidine): one 10mg tablet as needed twice a day for heart burn      No other changes were made to your medications, please take all your other home medicines as previously prescribed.      History of Present Illness    As per admitting provider, Dr. Stephen Nolen:   Risa De La Paz Luna Coker is a 80 y.o. female with PMH of pre-DM, HTN, HLD, Hx of Leukemia s/p bone marrow transplant, meningioma (stable), and obesity, who presents as a direct admit from [facility] ED. She complains of a 3 day history of nausea and diarrhea. She noted a cough on 8/10 as well as chills, and then she tested + for covid on 8/12 (unvaccinated). She then took Ivermectin for her Covid as recommended by a friend. Two hours after the ivermectin she states she started having watery diarrhea and nausea, which persisted for three days. Endorsing 8-10 watery BM daily, non bloody. She called her PCP who prescribed Zofran 8 mg in the outpatient setting but denies any improvement of nausea, still no vomiting however. She states the diarrhea resolved yesterday morning, but the persistent nausea caused her to present to the ED. Symptoms started on 8/10. Covid vaccination status Non-vaccinated. No recent antibiotic use or recent travel NO. [facility]    Crystal KATYA Marya Valdes is a 80 y.o. female with PMH of pre-DM, HTN, HLD, Hx of Leukemia s/p bone marrow transplant, meningioma (stable), and obesity who is admitted for hypoNA in the setting of GI losses with Covid. Patient improved quickly with fluids, hypovolemic cause. Discharged the next day with instructions for increased PO intake and antiemetics. Follow Problems were managed:     Hypovolemic Hyponatremia: Likely secondary to GI losses. Na 122 (8/14)-> 126 (8/15). Correcting with fluids , baseline ~ 140. Less likely SIADH in the setting of Covid (less likely as improved with fluids) or hypothyroidism given new TSH elevated at 7.   - PO intake, antiemetics as nino  - PCP for recheck Na level  - recheck TSH level (likely elevated due to acute illness)     COVID+: Symptom onset 8/10. Test positive 8/12. Unvaccinated. No O2 requirement. Out of window for paxlovid  - Supportive care      Transaminitis: ALT 78, .  Likely 2/2 livestock Ivermectin she took for COVID .   - Follow CMP outpatient      Diarrhea: Likely secondary to ivermectin vs covid infection. No recent abx use. Does not meet sepsis criteria. Symptoms resolved. - PO intake      Pre-DM: Last HgA1C         Lab Results   Component Value Date/Time     Hemoglobin A1c 5.8 (H) 10/25/2021 08:02 AM   - Life style medications advised      Hypertension: Chronic     - continue hoe meds      Hyperlipidemia:Last        Lab Results   Component Value Date/Time     Cholesterol, total 114 08/15/2022 02:56 AM     HDL Cholesterol 42 08/15/2022 02:56 AM     LDL, calculated 28.6 08/15/2022 02:56 AM     VLDL, calculated 43.4 08/15/2022 02:56 AM     Triglyceride 217 (H) 08/15/2022 02:56 AM     CHOL/HDL Ratio 2.7 08/15/2022 02:56 AM    On COQ10.   - Continue home Atorvastatin 40 mg every day. - Fenofibrate 134 mg. Hx of Leukemia: s/p bone marrow transplant: in [facility]s at [facility]. Stable, no current follow up or monitoring.   - anemia labs      Meningioma: stable on imaging from 11/18/2021 (compared to CT head on 6/3/2019). 7 mm calcified meningioma left middle cranial fossa. PCP advised serial imaging. Obesity: Body mass index is 34.09 kg/m². - Encouraging lifestyle modifications and further follow up outpatient. Supplements: takes the following home medications:  fish oil, melatonin 3 mg, Probiotic, Vitamin C, vitamin D3 2000 u, cinnamon bark d-manose, Quercetin, Vitamin D deficiency, zinc)       Visit Vitals  BP (!) 152/69 (BP 1 [facility]: Left upper arm, BP Patient Position: At rest)   Pulse 69   Temp 97.8 °F (36.6 °C)   Resp 17   Ht 5' 3\" (1.6 m)   Wt 192 lb 7.4 oz (87.3 kg)   SpO2 96%   BMI 34.09 kg/m²        Condition at discharge: Stable. Labs  No results for input(s): WBC, HGB, HCT, PLT, HGBEXT, HCTEXT, PLTEXT in the last 72 hours. No results for input(s): NA, K, CL, CO2, BUN, CREA, GLU, CA, MG, PHOS, URICA in the last 72 hours.   No results for input(s): ALT, AP, TBIL, TBILI, TP, ALB, GLOB, GGT, AML, LPSE in the last 72 hours. No lab exists for component: SGOT, GPT, AMYP, HLPSE  No results for input(s): PH, PCO2, PO2, TNIPOC, TROIQ, INR, PTP, APTT, FE, TIBC, PSAT, FERR, GLUCPOC, INREXT in the last 72 hours. No lab exists for component: GLPOC    Micro:  No results found for: CULT    Imaging:  XR CHEST PA LAT    Result Date: 8/14/2022  EXAM: XR CHEST PA LAT INDICATION: covid + COMPARISON: None. FINDINGS: PA and lateral radiographs of the chest demonstrate a linear opacity at the left lung base. The cardiac and mediastinal contours and pulmonary vascularity are remarkable for mild tortuosity of the descending aorta. The bones and soft tissues are within normal limits.      Left basilar subsegmental atelectasis          Chronic diagnoses   Problem List as of 8/15/2022 Date Reviewed: 10/25/2021            Codes Class Noted - Resolved    * (Principal) Hyponatremia ICD-10-CM: E87.1  ICD-9-CM: 276.1  8/15/2022 - Present        COVID ICD-10-CM: U07.1  ICD-9-CM: 079.89  8/15/2022 - Present        Meningioma ([facility] ICD-10-CM: D32.9  ICD-9-CM: 225.2  8/15/2022 - Present    Overview Signed 8/15/2022  2:34 AM by Joel Troy MD     stable on imaging from 2021             Bone marrow transplant status ([facility] ICD-10-CM: Z94.81  ICD-9-CM: V42.81  3/15/2018 - Present        Prediabetes ICD-10-CM: R73.03  ICD-9-CM: 790.29  11/30/2017 - Present        Elevated cholesterol ICD-10-CM: E78.00  ICD-9-CM: 272.0  11/30/2017 - Present        Hypercalcemia ICD-10-CM: E83.52  ICD-9-CM: 275.42  1/17/2017 - Present        Vitamin D deficiency ICD-10-CM: E55.9  ICD-9-CM: 268.9  1/17/2017 - Present        RESOLVED: Respiratory failure, acute ([facility] ICD-10-CM: J96.00  ICD-9-CM: 518.81  8/15/2022 - 8/15/2022           Discharge Medication List as of 8/15/2022  2:36 PM        START taking these medications    Details   prochlorperazine (Compazine) 5 mg tablet Take 1 Tablet by mouth every six (6) hours as needed for Nausea or Vomiting for up to 7 days. , Normal, Disp-28 Tablet, R-0      famotidine (PEPCID) 10 mg tablet Take 1 Tablet by mouth two (2) times a day., Normal, Disp-60 Tablet, R-0           CONTINUE these medications which have NOT CHANGED    Details   ondansetron (ZOFRAN ODT) 8 mg disintegrating tablet Take 1 Tablet by mouth every eight (8) hours as needed for Nausea or Vomiting for up to 5 days. , Normal, Disp-15 Tablet, R-0      atorvastatin (LIPITOR) 40 mg tablet Take 1 Tablet by mouth daily. , Normal, Disp-90 Tablet, R-1      bisoprolol-hydroCHLOROthiazide (ZIAC) 5-6.25 mg per tablet Take 1 Tablet by mouth daily. , Normal, Disp-90 Tablet, R-1      fenofibrate micronized (LOFIBRA) 134 mg capsule Take 1 capsule by mouth in the morning, Normal, Disp-30 Capsule, R-0      OTHER,NON-FORMULARY, Quercetin-bid, Historical Med      ZINC PO Take  by mouth., Historical Med      ascorbic acid (VITAMIN C PO) Take  by mouth., Historical Med      melatonin 3 mg tablet Take  by mouth., Historical Med      D-MANNOSE PO Take  by mouth. Takes 6 caps daily, Historical Med      OTHER Drinks Lemon in 64 oz of water daily. , Historical Med      UBIDECARENONE (ULTRA COQ10 PO) Take 200 mg by mouth., Historical Med      conjugated estrogens (PREMARIN) 0.625 mg/gram vaginal cream Apply 0.5 g to affected area daily. , Disp-30 g, R-0, Normal      B.animalis,bifid,infantis,long 10-15 mg TbEC Take  by mouth., Historical Med      cinnamon bark 500 mg cap TID, Historical Med, Disp-90 Cap, R-3      Blood-Glucose Meter monitoring kit Please check blood sugar once daily. E11.9 Please provide with a freestyle product., Normal, Disp-1 Kit, R-0      DOCOSAHEXANOIC ACID/EPA (FISH OIL PO) Take 1,200 mg by mouth daily. Twice daily, Historical Med      cholecalciferol (VITAMIN D3) 25 mcg (1,000 unit) cap Take 2,000 Units by mouth daily. , Historical Med           STOP taking these medications       aspirin delayed-release 81 mg tablet Comments:   Reason for Stopping: Diet:  Regular diet.     Activity:  As tolerated     Disposition: Home or Self Care    Discharge instructions to patient/family  Please seek medical attention for any new or worsening symptoms particularly fever, chest pain, shortness of breath, abdominal pain, nausea, vomiting    Follow up plans/appointments  Follow-up Information       Follow up With Specialties Details Why Contact Info    Tara Jay NP Family Nurse Practitioner, Nurse Practitioner Go on 8/22/2022 [facility] follow up appointment at 10:30am. The nurse will call you 10-15 minutes before to set up virtual link [address]  [address]  [phone]      Geovanny Hitchcock NP Nurse Practitioner   [address]  [facility] [facility] [address]  [address]  [phone]               Beck Miller MD  Family Medicine Resident, PGY2       For Billing    Chief Complaint   Patient presents with    Positive For Covid-19    Diarrhea    Nausea       Hospital Problems  Date Reviewed: 10/25/2021            Codes Class Noted POA    * (Principal) Hyponatremia ICD-10-CM: E87.1  ICD-9-CM: 276.1  8/15/2022 Unknown        COVID ICD-10-CM: U07.1  ICD-9-CM: 079.89  8/15/2022 Unknown        Meningioma ([facility] ICD-10-CM: D32.9  ICD-9-CM: 225.2  8/15/2022 Yes    Overview Signed 8/15/2022  2:34 AM by Tiffanie Wall MD     stable on imaging from 2021             Bone marrow transplant status ([facility] ICD-10-CM: Z94.81  ICD-9-CM: V42.81  3/15/2018 Yes        Prediabetes ICD-10-CM: R73.03  ICD-9-CM: 790.29  11/30/2017 Yes        Elevated cholesterol ICD-10-CM: E78.00  ICD-9-CM: 272.0  11/30/2017 Yes

## 2022-09-01 NOTE — PROGRESS NOTES
Chief Complaint   Patient presents with    Hospital Follow Up         1. Patient in office today for hospital follow up. Pt was seen at [facility] on 3/98 due to complications from covid. Pt took at home test-positive result. During hospital stay was noted to have low sodium levels. Pt does note when covid test was positive. Pt took 1/2 tsp of ivermectin horse paste. Resulted in severe diarrhea and nausea. Since being discharged have since repeat [facility] at [facility]-brought in report. Nausea and diarrhea has improved. Cough has is present. Cough is productive-secretions thick and white. Have been treating with mucinex-dm max q4hrs since Saturday. Pt has since decreased usage to only in afternoon. 1. Have you been to the ER, urgent care clinic since your last visit? Hospitalized since your last visit? No    2. Have you seen or consulted any other health care providers outside of the [address] [place] [address] since your last visit? Include any pap smears or colon screening.  No Yes

## 2022-09-02 LAB
ALBUMIN SERPL-MCNC: 4.1 G/DL (ref 3.7–4.7)
ALBUMIN/GLOB SERPL: 1.4 {RATIO} (ref 1.2–2.2)
ALP SERPL-CCNC: 87 IU/L (ref 44–121)
ALT SERPL-CCNC: 22 IU/L (ref 0–32)
AST SERPL-CCNC: 26 IU/L (ref 0–40)
BILIRUB SERPL-MCNC: 0.4 MG/DL (ref 0–1.2)
BUN SERPL-MCNC: 23 MG/DL (ref 8–27)
BUN/CREAT SERPL: 27 (ref 12–28)
CALCIUM SERPL-MCNC: 10.4 MG/DL (ref 8.7–10.3)
CHLORIDE SERPL-SCNC: 102 MMOL/L (ref 96–106)
CO2 SERPL-SCNC: 24 MMOL/L (ref 20–29)
CREAT SERPL-MCNC: 0.85 MG/DL (ref 0.57–1)
EGFR: 73 ML/MIN/1.73
GLOBULIN SER CALC-MCNC: 2.9 G/DL (ref 1.5–4.5)
GLUCOSE SERPL-MCNC: 130 MG/DL (ref 65–99)
POTASSIUM SERPL-SCNC: 4.5 MMOL/L (ref 3.5–5.2)
PROT SERPL-MCNC: 7 G/DL (ref 6–8.5)
SODIUM SERPL-SCNC: 139 MMOL/L (ref 134–144)

## 2022-09-02 NOTE — PROGRESS NOTES
The following message was sent to pt via [employer] portal in reference to lab results:    Good morning Ms. Amy Cuellar,   Great news, your repeat labs look GREAT! Your sodium has completely normalized and is now 139. You can STOP the pedialyte and high sodium foods! I don't think you need to worry about this dropping again since you diarrhea has resolved. Your liver function enzymes have also normalized. Your calcium is 0.1 points away from normal. That's actually a significant improvement because when this was checked at [facility] it was a 11.7. At this point, no further work up is indicated. I recommend you go back to your normal routine and follow up in 4 weeks to repeat another set of labs to make sure things stay where they should. Please let me know if you have any questions or concerns regarding these results.    TOMMY Smith

## 2022-10-06 ENCOUNTER — OFFICE VISIT (OUTPATIENT)
Dept: FAMILY MEDICINE CLINIC | Age: 72
End: 2022-10-06
Payer: MEDICARE

## 2022-10-06 ENCOUNTER — TELEPHONE (OUTPATIENT)
Dept: FAMILY MEDICINE CLINIC | Age: 72
End: 2022-10-06

## 2022-10-06 VITALS
WEIGHT: 195 LBS | SYSTOLIC BLOOD PRESSURE: 114 MMHG | HEART RATE: 75 BPM | TEMPERATURE: 98.2 F | HEIGHT: 63 IN | RESPIRATION RATE: 18 BRPM | BODY MASS INDEX: 34.55 KG/M2 | DIASTOLIC BLOOD PRESSURE: 76 MMHG | OXYGEN SATURATION: 100 %

## 2022-10-06 DIAGNOSIS — R14.2 BELCHING: ICD-10-CM

## 2022-10-06 DIAGNOSIS — Z86.16 HISTORY OF COVID-19: ICD-10-CM

## 2022-10-06 DIAGNOSIS — E87.1 HYPONATREMIA: Primary | ICD-10-CM

## 2022-10-06 PROBLEM — N18.30 CHRONIC RENAL DISEASE, STAGE III (HCC): Status: RESOLVED | Noted: 2022-10-06 | Resolved: 2022-10-06

## 2022-10-06 PROBLEM — N18.30 CHRONIC RENAL DISEASE, STAGE III (HCC): Status: ACTIVE | Noted: 2022-10-06

## 2022-10-06 PROCEDURE — 99213 OFFICE O/P EST LOW 20 MIN: CPT | Performed by: NURSE PRACTITIONER

## 2022-10-06 PROCEDURE — 1090F PRES/ABSN URINE INCON ASSESS: CPT | Performed by: NURSE PRACTITIONER

## 2022-10-06 PROCEDURE — G8417 CALC BMI ABV UP PARAM F/U: HCPCS | Performed by: NURSE PRACTITIONER

## 2022-10-06 PROCEDURE — G8432 DEP SCR NOT DOC, RNG: HCPCS | Performed by: NURSE PRACTITIONER

## 2022-10-06 PROCEDURE — G8427 DOCREV CUR MEDS BY ELIG CLIN: HCPCS | Performed by: NURSE PRACTITIONER

## 2022-10-06 PROCEDURE — 1123F ACP DISCUSS/DSCN MKR DOCD: CPT | Performed by: NURSE PRACTITIONER

## 2022-10-06 PROCEDURE — G9899 SCRN MAM PERF RSLTS DOC: HCPCS | Performed by: NURSE PRACTITIONER

## 2022-10-06 PROCEDURE — G8536 NO DOC ELDER MAL SCRN: HCPCS | Performed by: NURSE PRACTITIONER

## 2022-10-06 PROCEDURE — 1101F PT FALLS ASSESS-DOCD LE1/YR: CPT | Performed by: NURSE PRACTITIONER

## 2022-10-06 PROCEDURE — 3017F COLORECTAL CA SCREEN DOC REV: CPT | Performed by: NURSE PRACTITIONER

## 2022-10-06 PROCEDURE — G8399 PT W/DXA RESULTS DOCUMENT: HCPCS | Performed by: NURSE PRACTITIONER

## 2022-10-06 NOTE — PROGRESS NOTES
Chief Complaint   Patient presents with    Labs    Post-COVID Symptoms       1. Patient in office for recheck sodium levels. Pt would like to have covid antibodies included in labs-want the quantity # of how many antibodies    2. Pt have concerns regarding post covid sx:  Was started on pepcid 10mg bid during hospital admission. Unsure how long can stay on medication. Pt concern that belching has not improved since lov. Pt denies acid reflux or indigestion. Pt notes notes problems with taste-not getting better. Pt notes an increase in appetite. 3.Pt would like to discuss what medications or foods are allowed prior to colonoscopy. 1. Have you been to the ER, urgent care clinic since your last visit? Hospitalized since your last visit? No    2. Have you seen or consulted any other health care providers outside of the [address] [place] [address] since your last visit? Include any pap smears or colon screening.  No

## 2022-10-06 NOTE — PROGRESS NOTES
Chief Complaint   Patient presents with    Labs    Post-COVID Symptoms       Patient in office for recheck sodium levels. Routine has gone back to normal.   Denies any recurrence of diarrhea. Pt would like to have covid antibodies included in labs-want the quantity # of how many antibodies    Pt have concerns regarding post covid sx:  Was started on pepcid 10 mg bid during hospital admission. Discussed that she can try taking 2 twice a day to see if that improves. Unsure how long can stay on medication. Discussed she can take jairo  Pt concern that belching has not improved since lov. Pt denies acid reflux or indigestion. Pt notes notes problems with taste-not getting better. Examples are not being able to taste cinnamon or quinine in tonic water. Pt notes an increase in appetite. Smell never left, just taste. Has nodule present in the right anterior cervical region that has been present for many years. Has not changed. No previous imaging. Denies any other concerns at this time. Chief Complaint   Patient presents with    Labs    Post-COVID Symptoms     she is a 71y.o. year old female who presents for evalution. Reviewed PmHx, RxHx, FmHx, SocHx, AllgHx and updated and dated in the chart.     Review of Systems - negative except as listed above in the HPI    Objective:     Vitals:    10/06/22 0739   BP: 114/76   Pulse: 75   Resp: 18   Temp: 98.2 °F (36.8 °C)   TempSrc: Oral   SpO2: 100%   Weight: 195 lb (88.5 kg)   Height: 5' 3\" (1.6 m)     Physical Examination: General appearance - alert, well appearing, and in no distress  Mental status - normal mood, behavior  Eyes - pupils equal and reactive, extraocular eye movements intact  Ears - bilateral TM's and external ear canals normal  Nose - normal and patent, no erythema, discharge or polyps and normal nontender sinuses  Mouth - mucous membranes moist, pharynx normal without lesions  Neck - nodule present right submandibular region that is discrete mobile nontender, approx marble sized with no overlying skin changes that pt reports has been present for many years and has not changed  Chest - clear to auscultation, no wheezes, rales or rhonchi, symmetric air entry  Heart - normal rate, regular rhythm, normal S1, S2    Assessment/ Plan:   Diagnoses and all orders for this visit:    1. Hyponatremia  -     METABOLIC PANEL, COMPREHENSIVE; Future  Will notify results and deviate plan based on findings. 2. History of COVID-19  -     SARS-COV-2 AB, IGG; Future  Pt requesting Ab testing after having COVID in August.   3. Belching  Okay to increase the pepcid to 20 mg BID. Enc pt to discuss this sx with GI specialist. Scheduled to have a colonoscopy early next month, may benefit from also having an endoscopy at that time. I have discussed the diagnosis with the patient and the intended plan as seen in the above orders. The patient has received an after-visit summary and questions were answered concerning future plans. Medication Side Effects and Warnings were discussed with patient: yes  Patient Labs were reviewed and or requested: yes  Patient Past Records were reviewed and or requested  yes  Patient / Caregiver Understanding of treatment plan was verbalized during office visit YES    TOMMY Rivera    There are no Patient Instructions on file for this visit.

## 2022-10-06 NOTE — TELEPHONE ENCOUNTER
Pt called this morning requesting for A1C to be added on to labs that were ordered during this appt. Advised labs were not ordered and separate tube is needed. Pt stated she will await for antibody testing to come back and if not able to get quantitative # will take labs to [facility] and order A1C then. Advised can also check during medicare wellness-pt is due after 10/26. Pt stated she understood.

## 2022-10-08 LAB
ALBUMIN SERPL-MCNC: 4.5 G/DL (ref 3.7–4.7)
ALBUMIN/GLOB SERPL: 1.6 {RATIO} (ref 1.2–2.2)
ALP SERPL-CCNC: 71 IU/L (ref 44–121)
ALT SERPL-CCNC: 27 IU/L (ref 0–32)
AST SERPL-CCNC: 25 IU/L (ref 0–40)
BILIRUB SERPL-MCNC: 0.4 MG/DL (ref 0–1.2)
BUN SERPL-MCNC: 22 MG/DL (ref 8–27)
BUN/CREAT SERPL: 21 (ref 12–28)
CALCIUM SERPL-MCNC: 11 MG/DL (ref 8.7–10.3)
CHLORIDE SERPL-SCNC: 101 MMOL/L (ref 96–106)
CO2 SERPL-SCNC: 21 MMOL/L (ref 20–29)
CREAT SERPL-MCNC: 1.05 MG/DL (ref 0.57–1)
EGFR: 57 ML/MIN/1.73
GLOBULIN SER CALC-MCNC: 2.8 G/DL (ref 1.5–4.5)
GLUCOSE SERPL-MCNC: 103 MG/DL (ref 70–99)
INTERPRETATION: NORMAL
POTASSIUM SERPL-SCNC: 5.1 MMOL/L (ref 3.5–5.2)
PROT SERPL-MCNC: 7.3 G/DL (ref 6–8.5)
SARS-COV-2 SEMI-QUANT IGG AB: <13 AU/ML
SARS-COV-2 SPIKE AB, INTERP - CVSQ1M: NEGATIVE
SODIUM SERPL-SCNC: 140 MMOL/L (ref 134–144)

## 2022-10-10 NOTE — PROGRESS NOTES
The following message was sent to pt via [employer] portal in reference to lab results:    Good morning Ms. Xiao Metzger,   Attached are your labs from last week. Your sodium level is still in normal range. Your  calcium is up again. I'd like to recheck  this when you follow up for your medicare wellness and hemoglobin  a1c check. Your COVID test is negative showing you do not have any detectable antibodies to  COVID which is surprising. I'm not sure how to interpret this after your rapid test was negative in the ER in August and you had all the symptoms of COVID.    TOMMY Lima

## 2022-10-17 DIAGNOSIS — Z12.11 SCREENING FOR MALIGNANT NEOPLASM OF COLON: Primary | ICD-10-CM

## 2022-11-03 NOTE — PERIOP NOTES
[facility]  Endoscopy Preprocedure Instructions      1. On the day of your surgery, please report to registration located on the 2nd floor of the  [facility]. yes    2. You must have a responsible adult to drive you to the hospital, stay at the hospital during your procedure and drive you home. If they leave your procedure will not be started (no exceptions). yes    3. Do not have anything to eat or drink (including water, gum, mints, coffee, and juice) after midnight. This does not apply to the medications you were instructed to take by your physician. yes  If you are currently taking Plavix, Coumadin, Aspirin, or other blood-thinning agents, contact your physician for special instructions. yes,    4. If you are having a procedure that requires bowel prep: We recommend that you have only clear liquids the day before your procedure and begin your bowel prep by 5:00 pm.  You may continue to drink clear liquids until midnight. If for any reason you are not able to complete your prep please notify your physician immediately. yes    5. Have a list of all current medications, including vitamins, herbal supplements and any other over the counter medications. yes    6. If you wear glasses, contacts, dentures and/or hearing aids, they may be removed prior to procedure, please bring a case to store them in. yes    7. You should understand that if you do not follow these instructions your procedure may be cancelled. If your physical condition changes (I.e. fever, cold or flu) please contact your doctor as soon as possible. 8. It is important that you be on time.   If for any reason you are unable to keep your appointment please call [phone] the day of or your physicians office prior to your scheduled procedure

## 2022-11-04 ENCOUNTER — ANESTHESIA EVENT (OUTPATIENT)
Dept: ENDOSCOPY | Age: 72
End: 2022-11-04
Payer: MEDICARE

## 2022-11-04 ENCOUNTER — ANESTHESIA (OUTPATIENT)
Dept: ENDOSCOPY | Age: 72
End: 2022-11-04
Payer: MEDICARE

## 2022-11-04 ENCOUNTER — HOSPITAL ENCOUNTER (OUTPATIENT)
Age: 72
Setting detail: OUTPATIENT SURGERY
Discharge: HOME | End: 2022-11-04
Attending: SPECIALIST | Admitting: SPECIALIST
Payer: MEDICARE

## 2022-11-04 VITALS
OXYGEN SATURATION: 96 % | WEIGHT: 187.39 LBS | HEIGHT: 63 IN | BODY MASS INDEX: 33.2 KG/M2 | DIASTOLIC BLOOD PRESSURE: 77 MMHG | SYSTOLIC BLOOD PRESSURE: 117 MMHG | HEART RATE: 86 BPM | TEMPERATURE: 98.6 F | RESPIRATION RATE: 19 BRPM

## 2022-11-04 PROCEDURE — 74011250636 HC RX REV CODE- 250/636: Performed by: REGISTERED NURSE

## 2022-11-04 PROCEDURE — 77030013992 HC SNR POLYP ENDOSC BSC -B: Performed by: SPECIALIST

## 2022-11-04 PROCEDURE — 76040000019: Performed by: SPECIALIST

## 2022-11-04 PROCEDURE — 88305 TISSUE EXAM BY PATHOLOGIST: CPT

## 2022-11-04 PROCEDURE — 74011000250 HC RX REV CODE- 250: Performed by: REGISTERED NURSE

## 2022-11-04 PROCEDURE — 2709999900 HC NON-CHARGEABLE SUPPLY: Performed by: SPECIALIST

## 2022-11-04 PROCEDURE — 76060000031 HC ANESTHESIA FIRST 0.5 HR: Performed by: SPECIALIST

## 2022-11-04 RX ORDER — FLUMAZENIL 0.1 MG/ML
0.2 INJECTION INTRAVENOUS
Status: DISCONTINUED | OUTPATIENT
Start: 2022-11-04 | End: 2022-11-04 | Stop reason: HOSPADM

## 2022-11-04 RX ORDER — SODIUM CHLORIDE 9 MG/ML
50 INJECTION, SOLUTION INTRAVENOUS CONTINUOUS
Status: DISCONTINUED | OUTPATIENT
Start: 2022-11-04 | End: 2022-11-04 | Stop reason: HOSPADM

## 2022-11-04 RX ORDER — NALOXONE HYDROCHLORIDE 0.4 MG/ML
0.4 INJECTION, SOLUTION INTRAMUSCULAR; INTRAVENOUS; SUBCUTANEOUS
Status: DISCONTINUED | OUTPATIENT
Start: 2022-11-04 | End: 2022-11-04 | Stop reason: HOSPADM

## 2022-11-04 RX ORDER — SODIUM CHLORIDE 9 MG/ML
INJECTION, SOLUTION INTRAVENOUS
Status: DISCONTINUED | OUTPATIENT
Start: 2022-11-04 | End: 2022-11-04 | Stop reason: HOSPADM

## 2022-11-04 RX ORDER — DEXTROMETHORPHAN/PSEUDOEPHED 2.5-7.5/.8
1.2 DROPS ORAL
Status: DISCONTINUED | OUTPATIENT
Start: 2022-11-04 | End: 2022-11-04 | Stop reason: HOSPADM

## 2022-11-04 RX ORDER — MIDAZOLAM HYDROCHLORIDE 1 MG/ML
.25-5 INJECTION, SOLUTION INTRAMUSCULAR; INTRAVENOUS AS NEEDED
Status: DISCONTINUED | OUTPATIENT
Start: 2022-11-04 | End: 2022-11-04 | Stop reason: HOSPADM

## 2022-11-04 RX ORDER — PROPOFOL 10 MG/ML
INJECTION, EMULSION INTRAVENOUS
Status: DISCONTINUED | OUTPATIENT
Start: 2022-11-04 | End: 2022-11-04 | Stop reason: HOSPADM

## 2022-11-04 RX ORDER — FENTANYL CITRATE 50 UG/ML
25 INJECTION, SOLUTION INTRAMUSCULAR; INTRAVENOUS AS NEEDED
Status: DISCONTINUED | OUTPATIENT
Start: 2022-11-04 | End: 2022-11-04 | Stop reason: HOSPADM

## 2022-11-04 RX ORDER — EPHEDRINE SULFATE/0.9% NACL/PF 50 MG/5 ML
SYRINGE (ML) INTRAVENOUS AS NEEDED
Status: DISCONTINUED | OUTPATIENT
Start: 2022-11-04 | End: 2022-11-04 | Stop reason: HOSPADM

## 2022-11-04 RX ADMIN — PROPOFOL 150 MCG/KG/MIN: 10 INJECTION, EMULSION INTRAVENOUS at 10:06

## 2022-11-04 RX ADMIN — Medication 15 MG: at 10:24

## 2022-11-04 RX ADMIN — Medication 15 MG: at 10:11

## 2022-11-04 RX ADMIN — SODIUM CHLORIDE: 9 INJECTION, SOLUTION INTRAVENOUS at 10:05

## 2022-11-04 NOTE — ANESTHESIA POSTPROCEDURE EVALUATION
Procedure(s):  COLONOSCOPY  ENDOSCOPIC POLYPECTOMY. MAC    Anesthesia Post Evaluation        Patient location during evaluation: PACU  Level of consciousness: awake  Pain management: adequate  Airway patency: patent  Anesthetic complications: no  Cardiovascular status: acceptable  Respiratory status: acceptable  Hydration status: acceptable  Post anesthesia nausea and vomiting:  none      INITIAL Post-op Vital signs:   Vitals Value Taken Time   /77 11/04/22 1052   Temp 37 °C (98.6 °F) 11/04/22 1040   Pulse 84 11/04/22 1056   Resp 15 11/04/22 1056   SpO2 99 % 11/04/22 1056   Vitals shown include unvalidated device data.

## 2022-11-04 NOTE — PERIOP NOTES
1005  Timeout performed. Anesthesia staff at patient's bedside administering anesthesia and monitoring patients vital signs throughout procedure. See anesthesia note. Post procedure, report received from Sujata ISBELL. 1029  Endoscope was pre-cleaned at bedside immediately following procedure by endo tech,    Endoscope was pre-cleaned at bedside immediately following procedure by endo techLuis. 1032  Patient tolerated procedure. Abdomen soft and patient arousable and voices no complaints. Patient transported to endoscopy recovery area. Report given to post procedure Wesly HORN

## 2022-11-04 NOTE — ANESTHESIA PREPROCEDURE EVALUATION
Relevant Problems   No relevant active problems       Anesthetic History   No history of anesthetic complications            Review of Systems / Medical History  Patient summary reviewed, nursing notes reviewed and pertinent labs reviewed    Pulmonary  Within defined limits                 Neuro/Psych   Within defined limits           Cardiovascular    Hypertension                   GI/Hepatic/Renal  Within defined limits              Endo/Other  Within defined limits           Other Findings              Physical Exam    Airway  Mallampati: II  TM Distance: 4 - 6 cm  Neck ROM: normal range of motion   Mouth opening: Normal     Cardiovascular    Rhythm: regular  Rate: normal         Dental    Dentition: Lower dentition intact and Upper dentition intact     Pulmonary  Breath sounds clear to auscultation               Abdominal         Other Findings            Anesthetic Plan    ASA: 2  Anesthesia type: MAC          Induction: Intravenous  Anesthetic plan and risks discussed with: Patient

## 2022-11-04 NOTE — PROCEDURES
[address] [facility] TONIO Felipe MD  [phone]      2022    Colonoscopy Procedure Note  [place]  :  1950  [facility] Medical Record Number: [id number]    Indications:    Abnormal cologuard  PCP:  Agustina Leos NP  Anesthesia/Sedation: Conscious Sedation/Moderate Sedation/GETA, see notes  Endoscopist:  Dr. Romy Daugherty  Complications:  None  Estimated Blood Loss:  None    Permit:  The indications, risks, benefits and alternatives were reviewed with the patient or their decision maker who was provided an opportunity to ask questions and all questions were answered. The specific risks of colonoscopy with conscious sedation were reviewed, including but not limited to anesthetic complication, bleeding, adverse drug reaction, missed lesion, infection, IV site reactions, and intestinal perforation which would lead to the need for surgical repair. Alternatives to colonoscopy including radiographic imaging, observation without testing, or laboratory testing were reviewed including the limitations of those alternatives. After considering the options and having all their questions answered, the patient or their decision maker provided both verbal and written consent to proceed. Procedure in Detail:  After obtaining informed consent, positioning of the patient in the left lateral decubitus position, and conduction of a pre-procedure pause or \"time out\" the endoscope was introduced into the anus and advanced to the cecum, which was identified by the ileocecal valve and appendiceal orifice. The quality of the colonic preparation was good. A careful inspection was made as the colonoscope was withdrawn, findings and interventions are described below.     Findings:   Technically difficult procedure related to redundant sigmoid and transverse coupled with what feels like pelvic adhesions making navigation of sigmoid very difficult. In order to afford intubation of the proximal colon we had to use an adult upper GI endoscope to afford navigation of an acute angulation in the left colon. This region is without any neoplasia. A 6mm polyp of the transverse colon is removed with cold snare. In the rectum, medium internal hemorrhoids are noted without bleeding. Specimens:    See above    Complications:   None; patient tolerated the procedure well. Impression:  Colon polyp    Recommendations:     - Await pathology. Thank you for entrusting me with this patient's care. Please do not hesitate to contact me with any questions or if I can be of assistance with any of your other patients' GI needs. Signed By: Ti Zhou MD                        November 4, 2022      Surgical assistant none. Implants none unless specified.

## 2022-11-04 NOTE — INTERVAL H&P NOTE
Pre-Endoscopy H&P Update  Chief complaint/HPI/ROS:  The indication for the procedure, the patient's history and the patient's current medications are reviewed prior to the procedure and that data is reported on the H&P to which this document is attached. Any significant complaints with regard to organ systems will be noted, and if not mentioned then a review of systems is not contributory. Past Medical History:   Diagnosis Date    Hypertension     Leukemia ([facility] [address] 1985      Past Surgical History:   Procedure Laterality Date    HX BONE MARROW TRANSPLANT      first patient to receive, [facility]    HX BUNIONECTOMY      HX CARPAL TUNNEL RELEASE Bilateral     1990, 1996    HX CHOLECYSTECTOMY      HX ORTHOPAEDIC      ankle. Social   Social History     Tobacco Use    Smoking status: Former    Smokeless tobacco: Never   Substance Use Topics    Alcohol use: No      Family History   Problem Relation Age of Onset    Hypertension Mother     Diabetes Father     Breast Cancer Sister 62      Allergies   Allergen Reactions    Codeine Nausea and Vomiting    Erythromycin Nausea and Vomiting    Macrobid [Nitrofurantoin Monohyd/M-Cryst] Diarrhea and Nausea and Vomiting      Prior to Admission Medications   Prescriptions Last Dose Informant Patient Reported? Taking? D-MANNOSE PO 11/3/2022  Yes Yes   Sig: Take  by mouth. Takes 6 caps daily   DOCOSAHEXANOIC ACID/EPA (FISH OIL PO) 11/2/2022  Yes Yes   Sig: Take 1,200 mg by mouth daily. Twice daily   OTHER,NON-FORMULARY, 11/2/2022  Yes Yes   Sig: Quercetin-bid   UBIDECARENONE (ULTRA COQ10 PO)   Yes Yes   Sig: Take 200 mg by mouth. ZINC PO 11/2/2022  Yes Yes   Sig: Take  by mouth. ascorbic acid (VITAMIN C PO) 11/3/2022  Yes Yes   Sig: Take  by mouth. atorvastatin (LIPITOR) 40 mg tablet 11/3/2022  No Yes   Sig: Take 1 Tablet by mouth daily. Patient taking differently: Take 20 mg by mouth every other day.    bisoprolol-hydroCHLOROthiazide [facility]) 5-6.25 mg per tablet 11/4/2022  No Yes   Sig: Take 1 Tablet by mouth daily. cholecalciferol (VITAMIN D3) 25 mcg (1,000 unit) cap 11/4/2022  Yes Yes   Sig: Take 2,000 Units by mouth daily. conjugated estrogens (PREMARIN) 0.625 mg/gram vaginal cream 11/3/2022  No Yes   Sig: Apply 0.5 g to affected area daily. famotidine (PEPCID) 10 mg tablet Not Taking  No No   Sig: Take 1 Tablet by mouth two (2) times a day. Patient not taking: Reported on 11/3/2022   fenofibrate micronized (LOFIBRA) 134 mg capsule 11/2/2022  No Yes   Sig: Take 1 capsule by mouth in the morning   melatonin 3 mg tablet 11/3/2022  Yes Yes   Sig: Take  by mouth. Facility-Administered Medications: None       PHYSICAL EXAM:  The patient is examined immediately prior to the procedure. Visit Vitals  /73   Pulse 78   Temp 98.2 °F (36.8 °C)   Resp 14   Ht 5' 3\" (1.6 m)   Wt 85 kg (187 lb 6.3 oz)   SpO2 98%   Breastfeeding No   BMI 33.19 kg/m²     Gen: Appears comfortable, no distress. Pulm: comfortable respirations with no abnormal audible breath sounds  HEART: well perfused, no abnormal audible heart sounds  GI: abdomen flat. PLAN:  Informed consent discussion held, patient afforded an opportunity to ask questions and all questions answered. After being advised of the risks, benefits, and alternatives, the patient requested that we proceed and indicated so on a written consent form. Will proceed with procedure as planned.   Nick Stanford MD

## 2022-11-04 NOTE — H&P
71 y.o. female for open access colonoscopy for screening   Additional data for completion of the targeted pre-endoscopy H&P will be provided under 'H&P interval notes'. Please see that document which will be attached to this.   Nick Stanford MD    Positive cologuadevon 7/2022

## 2022-11-04 NOTE — DISCHARGE INSTRUCTIONS
[address] [facility] TONIO Salcedo MD  [phone]      November 4, 2022    Gloria Jay  YOB: 1950    COLONOSCOPY DISCHARGE INSTRUCTIONS    If there is redness at IV site you should apply warm compress to area. If redness or soreness persist contact Dr. William Salcedo' or your primary care doctor. There may be a slight amount of blood passed from the rectum. Gaseous discomfort may develop, but walking, belching will help relieve this. You may not operate a vehicle for 12 hours  You may not operate machinery or dangerous appliances for rest of today  You may not drink alcoholic beverages for 12 hours  Avoid making any critical decisions for 24 hours    DIET:  You may resume your normal diet, but some patients find that heavy or large meals may lead to indigestion or vomiting. I suggest a light meal as first food intake. MEDICATIONS:  The use of some over-the-counter pain medication may lead to bleeding after colon biopsies or polyp removal.  Tylenol (also called acetaminophen) is safe to take even if you have had colonoscopy with polyp removal.  Based on the procedure you had today you may not safely take aspirin or aspirin-like products for the next ten (10) days. Remember that Tylenol (also called acetaminophen) is safe to take after colonoscopy even if you have had biopsies or polyps removed. ACTIVITY:  You may resume your normal household activities, but it is recommended that you spend the remainder of the day resting -  avoid any strenuous activity. CALL DR. Yang Douglas' OFFICE IF:  Increasing pain, nausea, vomiting  Abdominal distension (swelling)  Significant new or increased bleeding (oral or rectal)  Fever/Chills  Chest pain/shortness of breath                       Additional instructions:   Great news, no colon cancer, but we did remove one small polyp.   I'll contact you with those results by letter in 10-14 days. No aspirin 10 days      It was an honor to be your doctor today. Please let me or my office staff know if you have any feedback about today's procedure. Giovani Butler MD    Colonoscopy saves lives, and can prevent colon cancer. Everyone aged 48 or older needs colonoscopy.   Tell your family and friends: get the test! (0) No visual loss

## 2022-11-07 ENCOUNTER — TELEPHONE (OUTPATIENT)
Dept: FAMILY MEDICINE CLINIC | Age: 72
End: 2022-11-07

## 2022-11-07 RX ORDER — FENOFIBRATE 134 MG/1
CAPSULE ORAL
Qty: 90 CAPSULE | Refills: 3 | Status: SHIPPED | OUTPATIENT
Start: 2022-11-07 | End: 2022-11-14 | Stop reason: SDUPTHER

## 2022-11-07 RX ORDER — BISOPROLOL FUMARATE AND HYDROCHLOROTHIAZIDE 5; 6.25 MG/1; MG/1
1 TABLET ORAL DAILY
Qty: 90 TABLET | Refills: 3 | Status: SHIPPED | OUTPATIENT
Start: 2022-11-07

## 2022-11-07 RX ORDER — ATORVASTATIN CALCIUM 40 MG/1
20 TABLET, FILM COATED ORAL EVERY OTHER DAY
Qty: 30 TABLET | Refills: 3 | Status: SHIPPED | OUTPATIENT
Start: 2022-11-07

## 2022-11-07 NOTE — TELEPHONE ENCOUNTER
Spoke with pt,mail order has no rx for fenofibrate on file,need refill with 3 refill sent to pharmacy and refills for ziac and atorvastatin sent to pharmacy with 2 refills,so that medications can be prescribed all at once.   Pt states pharmacy told pt that nurse or provider can make medications as auto refill,advised that is a pharmacy option and we have no control for that on our end,would have to set that up with pharmacist.

## 2022-11-14 RX ORDER — FENOFIBRATE 134 MG/1
CAPSULE ORAL
Qty: 90 CAPSULE | Refills: 3 | Status: SHIPPED | OUTPATIENT
Start: 2022-11-14

## 2022-11-28 ENCOUNTER — TELEPHONE (OUTPATIENT)
Dept: FAMILY MEDICINE CLINIC | Age: 72
End: 2022-11-28

## 2022-11-28 NOTE — TELEPHONE ENCOUNTER
Patient would like to speak with nurse. She was very nice but didn't want to discuss the issue.  Patient's phone: [phone]

## 2022-11-29 NOTE — TELEPHONE ENCOUNTER
Pt states she had incident that occurred on Friday where she was eating a roll and it got stuck in esophagus,resolved with cold water. Pt couldn't specify with nurse how often this occurs-reports doesn't happen often but when it does would norm wait for it to resolve and can be painful when it does occur. Pt has appt on 12/9 and wanted to make provider aware of occurrences-stated she feels fine and being more aware of food portions and chewing more thoroughly. Can wait to discuss with provider during appt or can come in sooner.

## 2022-12-09 ENCOUNTER — OFFICE VISIT (OUTPATIENT)
Dept: FAMILY MEDICINE CLINIC | Age: 72
End: 2022-12-09
Payer: MEDICARE

## 2022-12-09 VITALS
RESPIRATION RATE: 18 BRPM | HEART RATE: 68 BPM | SYSTOLIC BLOOD PRESSURE: 133 MMHG | WEIGHT: 195 LBS | OXYGEN SATURATION: 99 % | DIASTOLIC BLOOD PRESSURE: 82 MMHG | TEMPERATURE: 98.3 F | HEIGHT: 63 IN | BODY MASS INDEX: 34.55 KG/M2

## 2022-12-09 DIAGNOSIS — E83.52 HYPERCALCEMIA: ICD-10-CM

## 2022-12-09 DIAGNOSIS — K21.9 GASTROESOPHAGEAL REFLUX DISEASE, UNSPECIFIED WHETHER ESOPHAGITIS PRESENT: ICD-10-CM

## 2022-12-09 DIAGNOSIS — I10 ESSENTIAL HYPERTENSION: ICD-10-CM

## 2022-12-09 DIAGNOSIS — E55.9 VITAMIN D DEFICIENCY: ICD-10-CM

## 2022-12-09 DIAGNOSIS — D32.9 MENINGIOMA (HCC): ICD-10-CM

## 2022-12-09 DIAGNOSIS — E78.00 ELEVATED CHOLESTEROL: ICD-10-CM

## 2022-12-09 DIAGNOSIS — Z00.00 MEDICARE ANNUAL WELLNESS VISIT, SUBSEQUENT: Primary | ICD-10-CM

## 2022-12-09 DIAGNOSIS — E87.1 HYPONATREMIA: ICD-10-CM

## 2022-12-09 DIAGNOSIS — R73.03 PREDIABETES: ICD-10-CM

## 2022-12-09 PROBLEM — U07.1 COVID: Status: RESOLVED | Noted: 2022-08-15 | Resolved: 2022-12-09

## 2022-12-09 RX ORDER — PANTOPRAZOLE SODIUM 40 MG/1
40 TABLET, DELAYED RELEASE ORAL DAILY
Qty: 30 TABLET | Refills: 2 | Status: SHIPPED | OUTPATIENT
Start: 2022-12-09

## 2022-12-09 NOTE — PATIENT INSTRUCTIONS
Medicare Wellness Visit, Female     The best way to live healthy is to have a lifestyle where you eat a well-balanced diet, exercise regularly, limit alcohol use, and quit all forms of tobacco/nicotine, if applicable. Regular preventive services are another way to keep healthy. Preventive services (vaccines, screening tests, monitoring & exams) can help personalize your care plan, which helps you manage your own care. Screening tests can find health problems at the earliest stages, when they are easiest to treat. [facility] follows the current, evidence-based guidelines published by the [facility] ([facility]) when recommending preventive services for our patients. Because we follow these guidelines, sometimes recommendations change over time as research supports it. (For example, mammograms used to be recommended annually. Even though Medicare will still pay for an annual mammogram, the newer guidelines recommend a mammogram every two years for women of average risk). Of course, you and your doctor may decide to screen more often for some diseases, based on your risk and your co-morbidities (chronic disease you are already diagnosed with). Preventive services for you include:  - Medicare offers their members a free annual wellness visit, which is time for you and your primary care provider to discuss and plan for your preventive service needs.  Take advantage of this benefit every year!    -Over the age of 72 should receive the recommended pneumonia vaccines.    -All adults should have a flu vaccine yearly.  -All adults should have a tetanus vaccine every 10 years.   -Over the age 48 should receive the shingles vaccines.        -All adults should be screened once for Hepatitis C.  -All adults age 38-68 who are overweight should have a diabetes screening test once every three years.   -Other screening tests and preventive services for persons with diabetes include: an eye exam to screen for diabetic retinopathy, a kidney function test, a foot exam, and stricter control over your cholesterol.   -Cardiovascular screening for adults with routine risk involves an electrocardiogram (ECG) at intervals determined by your doctor.     -Colorectal cancer screenings should be done for adults age 39-70 with no increased risk factors for colorectal cancer. There are a number of acceptable methods of screening for this type of cancer. Each test has its own benefits and drawbacks. Discuss with your doctor what is most appropriate for you during your annual wellness visit. The different tests include: colonoscopy (considered the best screening method), a fecal occult blood test, a fecal DNA test, and sigmoidoscopy.    -Lung cancer screening is recommended annually with a low dose CT scan for adults between age 54 and 68, who have smoked at least 30 pack years (equivalent of 1 pack per day for 30 days), and who is a current smoker or quit less than 15 years ago.    -A bone mass density test is recommended when a woman turns 65 to screen for osteoporosis. This test is only recommended one time, as a screening. Some providers will use this same test as a disease monitoring tool if you already have osteoporosis. -Breast cancer screenings are recommended every other year for women of normal risk, age 54-69.    -Cervical cancer screenings for women over age 72 are only recommended with certain risk factors.      Here is a list of your current Health Maintenance items (your personalized list of preventive services) with a due date:  Health Maintenance Due   Topic Date Due    COVID-19 Vaccine (1) Never done    Yearly Flu Vaccine (1) 08/01/2022    Hemoglobin A1C    10/25/2022

## 2022-12-09 NOTE — PROGRESS NOTES
Chief Complaint   Patient presents with    Annual Wellness Visit    Labs     Patient in office today for medicare wellness and fasting labs. Pt has history of meningioma. Last imaging done in 2021 and verified stability. First diagnosed in 2019 when imaging was done at an outside facility. Not interested in having this done at this time. Would like to wait until next year. Pt is going to have an endoscopy in the near future due to this new issue with difficulty swallowing her food. Scheduled for February. Health Maintenance Due   Topic Date Due    COVID-19 Vaccine (1) Never done    Flu Vaccine (1) 08/01/2022    A1C test (Diabetic or Prediabetic)  10/25/2022    Medicare Yearly Exam  10/26/2022     This is the Subsequent Medicare Annual Wellness Exam, performed 12 months or more after the Initial AWV or the last Subsequent AWV    I have reviewed the patient's medical history in detail and updated the computerized patient record. Assessment/Plan   Education and counseling provided:  Are appropriate based on today's review and evaluation    1. Medicare annual wellness visit, subsequent    2. Essential hypertension  BP at goal on ziac daily. Elevated cholesterol  -     LIPID PANEL; Future  Currently taking lofibra and lipitor 40 mg daily. 3. Hypercalcemia  -     METABOLIC PANEL, COMPREHENSIVE; Future  -     CBC WITH AUTOMATED DIFF; Future  -     TSH 3RD GENERATION; Future  Will notify results and deviate plan based on findings. 4. Hyponatremia  -     METABOLIC PANEL, COMPREHENSIVE; Future  Will notify results and deviate plan based on findings. 5. Prediabetes  -     HEMOGLOBIN A1C WITH EAG; Future  Continue with efforts to follow a low carb diet. 6. Vitamin D deficiency  -     VITAMIN D, 25 HYDROXY; Future  Will notify results and deviate plan based on findings. 7. Meningioma ([facility] [address]  Pt declined CT this year, would like to postpone until next year.    8. Gastroesophageal reflux disease, unspecified whether esophagitis present  -     pantoprazole (PROTONIX) 40 mg tablet; Take 1 Tablet by mouth daily. , Normal, Disp-30 Tablet, R-2   Keep appt with Dr. Edgardo Leung in February. In the meantime recommended pt start protonix once daily as prescribed in place of pepcid since that has not been very helpful. Reviewed SEs/ADRs of medication. Depression Risk Factor Screening     3 most recent PHQ Screens 12/9/2022   Little interest or pleasure in doing things Not at all   Feeling down, depressed, irritable, or hopeless Not at all   Total Score PHQ 2 0       Alcohol & Drug Abuse Risk Screen    Do you average more than 1 drink per night or more than 7 drinks a week:  No    On any one occasion in the past three months have you have had more than 3 drinks containing alcohol:  No          Functional Ability and Level of Safety    Hearing: Hearing is good. Activities of Daily Living: The home contains: no safety equipment. Patient does total self care      Ambulation: with no difficulty     Fall Risk:  Fall Risk Assessment, last 12 mths 12/9/2022   Able to walk? Yes   Fall in past 12 months? 0   Do you feel unsteady? 0   Are you worried about falling 0      Abuse Screen:  Patient is not abused       Cognitive Screening    Has your family/caregiver stated any concerns about your memory: no  Just forgetful at times. Instruct to listen carefully to and remember 3 unrelated words and then repeat the words (dog, hand, smile). Immediate recall. Spell world backwards. \"DLROW. \"  Recall a recent event. Recalls Thanksgiving plans. Went to her granddaughters, first Thanksgiving as a  couple. How is an apple like a banana? \"They're both a fruit. \"  Ask to repeat 3 previous words. Recalls all 3 without any difficulty.      Objective:     Vitals:    12/09/22 1022   BP: 133/82   Pulse: 68   Resp: 18   Temp: 98.3 °F (36.8 °C)   TempSrc: Oral   SpO2: 99%   Weight: 195 lb (88.5 kg)   Height: 5' 3\" (1.6 m)     General appearance - alert, well appearing, and in no distress  Mental status - alert, oriented to person, place, and time, normal mood, behavior, speech, dress, motor activity, and thought processes  Eyes - pupils equal and reactive, extraocular eye movements intact  Ears - bilateral TM's and external ear canals normal  Nose - normal and patent, no erythema, discharge or polyps  Mouth - mucous membranes moist, pharynx normal without lesions  Neck - supple, chronic enlarged lymph node right anterior cervical chain that is unchanged, carotids upstroke normal bilaterally, no bruits, thyroid exam: thyroid is normal in size without nodules or tenderness  Chest - clear to auscultation, no wheezes, rales or rhonchi, symmetric air entry  Heart - normal rate, regular rhythm, normal S1, S2  Extremities - peripheral pulses normal, no pedal edema, no clubbing or cyanosis; spider veins present LLE  Skin - normal coloration and turgor    Health Maintenance Due     Health Maintenance Due   Topic Date Due    COVID-19 Vaccine (1) Never done    Flu Vaccine (1) 08/01/2022    A1C test (Diabetic or Prediabetic)  10/25/2022       Patient Care Team   Patient Care Team:  Sidonie Dakins, NP as PCP - General (Nurse Practitioner)  Sidonie Dakins, NP as PCP - [facility] Empaneled Provider    History     Patient Active Problem List   Diagnosis Code    Hypercalcemia E83.52    Vitamin D deficiency E55.9    Prediabetes R73.03    Elevated cholesterol E78.00    Bone marrow transplant status ([facility] [address] Z94.81    Hyponatremia E87.1    COVID U07.1    Meningioma ([facility] [address] D32.9     Past Medical History:   Diagnosis Date    Hypertension     Leukemia ([facility] [address] 1985      Past Surgical History:   Procedure Laterality Date    COLONOSCOPY N/A 11/4/2022    COLONOSCOPY performed by Jeanette Boss MD at [address]      first patient to receive, [facility]    HX BUNIONECTOMY      HX CARPAL TUNNEL RELEASE Bilateral     1990, 1996    HX CHOLECYSTECTOMY      HX ORTHOPAEDIC      ankle. Current Outpatient Medications   Medication Sig Dispense Refill    flaxseed oil (OMEGA 3 PO) Take  by mouth. fenofibrate micronized (LOFIBRA) 134 mg capsule Take 1 capsule by mouth in the morning 90 Capsule 3    bisoprolol-hydroCHLOROthiazide (ZIAC) 5-6.25 mg per tablet Take 1 Tablet by mouth daily. 90 Tablet 3    atorvastatin (LIPITOR) 40 mg tablet Take 0.5 Tablets by mouth every other day. 30 Tablet 3    famotidine (PEPCID) 10 mg tablet Take 1 Tablet by mouth two (2) times a day. 60 Tablet 0    OTHER,NON-FORMULARY, Quercetin-bid      ZINC PO Take  by mouth. ascorbic acid (VITAMIN C PO) Take  by mouth.      melatonin 3 mg tablet Take  by mouth. D-MANNOSE PO Take  by mouth. Takes 6 caps daily      UBIDECARENONE (ULTRA COQ10 PO) Take 200 mg by mouth. conjugated estrogens (PREMARIN) 0.625 mg/gram vaginal cream Apply 0.5 g to affected area daily. 30 g 0    DOCOSAHEXANOIC ACID/EPA (FISH OIL PO) Take 1,000 mg by mouth daily. Twice daily      cholecalciferol (VITAMIN D3) 25 mcg (1,000 unit) cap Take 2,000 Units by mouth daily.        Allergies   Allergen Reactions    Codeine Nausea and Vomiting    Erythromycin Nausea and Vomiting    Macrobid [Nitrofurantoin Monohyd/M-Cryst] Diarrhea and Nausea and Vomiting       Family History   Problem Relation Age of Onset    Hypertension Mother     Diabetes Father     Breast Cancer Sister 62     Social History     Tobacco Use    Smoking status: Former    Smokeless tobacco: Never   Substance Use Topics    Alcohol use: No         Susannah Hinojosa NP

## 2022-12-09 NOTE — PROGRESS NOTES
Chief Complaint   Patient presents with    Annual Wellness Visit    Labs       1. Patient in office today for medicare wellness and fasting labs. 1. Have you been to the ER, urgent care clinic since your last visit? Hospitalized since your last visit? No    2. Have you seen or consulted any other health care providers outside of the [address] [place] [address] since your last visit? Include any pap smears or colon screening.  No

## 2022-12-10 LAB
25(OH)D3+25(OH)D2 SERPL-MCNC: 35.9 NG/ML (ref 30–100)
ALBUMIN SERPL-MCNC: 4.8 G/DL (ref 3.7–4.7)
ALBUMIN/GLOB SERPL: 2 {RATIO} (ref 1.2–2.2)
ALP SERPL-CCNC: 71 IU/L (ref 44–121)
ALT SERPL-CCNC: 28 IU/L (ref 0–32)
AST SERPL-CCNC: 30 IU/L (ref 0–40)
BASOPHILS # BLD AUTO: 0.1 X10E3/UL (ref 0–0.2)
BASOPHILS NFR BLD AUTO: 1 %
BILIRUB SERPL-MCNC: 0.4 MG/DL (ref 0–1.2)
BUN SERPL-MCNC: 18 MG/DL (ref 8–27)
BUN/CREAT SERPL: 18 (ref 12–28)
CALCIUM SERPL-MCNC: 11 MG/DL (ref 8.7–10.3)
CHLORIDE SERPL-SCNC: 101 MMOL/L (ref 96–106)
CHOLEST SERPL-MCNC: 162 MG/DL (ref 100–199)
CO2 SERPL-SCNC: 21 MMOL/L (ref 20–29)
CREAT SERPL-MCNC: 1.01 MG/DL (ref 0.57–1)
EGFR: 60 ML/MIN/1.73
EOSINOPHIL # BLD AUTO: 0.1 X10E3/UL (ref 0–0.4)
EOSINOPHIL NFR BLD AUTO: 2 %
ERYTHROCYTE [DISTWIDTH] IN BLOOD BY AUTOMATED COUNT: 13.8 % (ref 11.7–15.4)
EST. AVERAGE GLUCOSE BLD GHB EST-MCNC: 126 MG/DL
GLOBULIN SER CALC-MCNC: 2.4 G/DL (ref 1.5–4.5)
GLUCOSE SERPL-MCNC: 90 MG/DL (ref 70–99)
HBA1C MFR BLD: 6 % (ref 4.8–5.6)
HCT VFR BLD AUTO: 44.6 % (ref 34–46.6)
HDLC SERPL-MCNC: 65 MG/DL
HGB BLD-MCNC: 14.5 G/DL (ref 11.1–15.9)
IMM GRANULOCYTES # BLD AUTO: 0 X10E3/UL (ref 0–0.1)
IMM GRANULOCYTES NFR BLD AUTO: 0 %
IMP & REVIEW OF LAB RESULTS: NORMAL
LDLC SERPL CALC-MCNC: 77 MG/DL (ref 0–99)
LYMPHOCYTES # BLD AUTO: 3.2 X10E3/UL (ref 0.7–3.1)
LYMPHOCYTES NFR BLD AUTO: 43 %
MCH RBC QN AUTO: 28.7 PG (ref 26.6–33)
MCHC RBC AUTO-ENTMCNC: 32.5 G/DL (ref 31.5–35.7)
MCV RBC AUTO: 88 FL (ref 79–97)
MONOCYTES # BLD AUTO: 0.5 X10E3/UL (ref 0.1–0.9)
MONOCYTES NFR BLD AUTO: 7 %
NEUTROPHILS # BLD AUTO: 3.6 X10E3/UL (ref 1.4–7)
NEUTROPHILS NFR BLD AUTO: 47 %
PLATELET # BLD AUTO: 307 X10E3/UL (ref 150–450)
POTASSIUM SERPL-SCNC: 4.9 MMOL/L (ref 3.5–5.2)
PROT SERPL-MCNC: 7.2 G/DL (ref 6–8.5)
RBC # BLD AUTO: 5.06 X10E6/UL (ref 3.77–5.28)
SODIUM SERPL-SCNC: 140 MMOL/L (ref 134–144)
TRIGL SERPL-MCNC: 111 MG/DL (ref 0–149)
TSH SERPL DL<=0.005 MIU/L-ACNC: 2.74 UIU/ML (ref 0.45–4.5)
VLDLC SERPL CALC-MCNC: 20 MG/DL (ref 5–40)
WBC # BLD AUTO: 7.5 X10E3/UL (ref 3.4–10.8)

## 2022-12-11 NOTE — PROGRESS NOTES
"Anesthesia Pre-Procedure Evaluation    Patient: Vivek Epstein   MRN:     [id number] Gender:   male   Age:    18 The following message was sent to pt via [employer] portal in reference to lab results:    Good afternoon Ms. Jelly Castaneda,   Attached are the results of your most recent lab work. I have the following recommendations:    1. Your CBC which looks at your white blood cells, red blood cells, and hemoglobin came back looking normal. No sign of infection or anemia. 2. Your metabolic panel which looks at your blood glucose, liver function, and kidney function looks good minus your calcium being persistently high. Are you currently taking any supplementation with calcium in it? How many TUMS per day have you been taking prior to this blood work? Sometimes taking TUMS can make your calcium level high. 3. Your cholesterol came back looking great so keep up the good work with diet and keep taking your Lipitor daily as prescribed. 4. Your hemoglobin a1c shows that your prediabetes has worsened compared to last check but it is still considered controlled. Continue with your efforts to follow a low carb diet to prevent this from worsening and needing to add another medication to your daily regimen. 5. Your TSH which screens for thyroid disease came back normal. This means you do not have hyper or hypothyroidism. 6. Your vitamin D level came back normal showing that you are not Vitamin D deficient and do not require supplementation. Please let me know if you have any questions or concerns regarding these results.    Liam Sellers, EDISONC  Ronald Robertson MD

## 2022-12-23 ENCOUNTER — TELEPHONE (OUTPATIENT)
Dept: FAMILY MEDICINE CLINIC | Age: 72
End: 2022-12-23

## 2022-12-23 RX ORDER — ATORVASTATIN CALCIUM 40 MG/1
20 TABLET, FILM COATED ORAL EVERY OTHER DAY
Qty: 45 TABLET | Refills: 3 | Status: SHIPPED | OUTPATIENT
Start: 2022-12-23

## 2022-12-23 RX ORDER — ATORVASTATIN CALCIUM 40 MG/1
20 TABLET, FILM COATED ORAL EVERY OTHER DAY
Qty: 45 TABLET | Refills: 3 | Status: SHIPPED | OUTPATIENT
Start: 2022-12-23 | End: 2022-12-23 | Stop reason: SDUPTHER

## 2022-12-23 NOTE — TELEPHONE ENCOUNTER
Representative at [facility] answered the call and stated to call back after 2.     Will call again after 2

## 2022-12-23 NOTE — TELEPHONE ENCOUNTER
----- Message from Zahraa Kothari sent at 12/23/2022 12:14 PM EST -----  Subject: Medication Problem    Medication: atorvastatin (LIPITOR) 40 mg tablet  Dosage: 20 MG, Take 0.5 Tablets by mouth every other day. Ordering Provider: beatris    Question/Problem: Patient would like to request that this medication   please be put back on a 90 day supply instead of a 30 day supply if   possible.        Pharmacy: [phone]    ---------------------------------------------------------------------------  --------------  [address]  [phone]; OK to leave message on voicemail  ---------------------------------------------------------------------------  --------------    SCRIPT ANSWERS  Relationship to Patient: Self

## 2023-01-04 ENCOUNTER — DOCUMENTATION ONLY (OUTPATIENT)
Dept: FAMILY MEDICINE CLINIC | Age: 73
End: 2023-01-04

## 2023-01-04 NOTE — PROGRESS NOTES
Faxed [facility] info sheet & referral requisition to dr Jay Finn at [phone].   Referrals are no longer required

## 2023-05-17 RX ORDER — BISOPROLOL FUMARATE AND HYDROCHLOROTHIAZIDE 5; 6.25 MG/1; MG/1
1 TABLET ORAL DAILY
COMMUNITY
Start: 2022-11-07

## 2023-05-17 RX ORDER — LANOLIN ALCOHOL/MO/W.PET/CERES
CREAM (GRAM) TOPICAL
COMMUNITY

## 2023-05-17 RX ORDER — PANTOPRAZOLE SODIUM 40 MG/1
40 TABLET, DELAYED RELEASE ORAL DAILY
COMMUNITY
Start: 2022-12-09

## 2023-05-17 RX ORDER — ATORVASTATIN CALCIUM 40 MG/1
20 TABLET, FILM COATED ORAL EVERY OTHER DAY
COMMUNITY
Start: 2022-12-23

## 2023-05-17 RX ORDER — FAMOTIDINE 10 MG
10 TABLET ORAL 2 TIMES DAILY
COMMUNITY
Start: 2022-08-15

## 2023-05-17 RX ORDER — FENOFIBRATE 134 MG/1
CAPSULE ORAL
COMMUNITY
Start: 2022-11-14

## 2023-08-02 ENCOUNTER — TELEPHONE (OUTPATIENT)
Age: 73
End: 2023-08-02

## 2023-08-02 NOTE — TELEPHONE ENCOUNTER
----- Message from Anders Reina sent at 8/2/2023 10:00 AM EDT -----  Subject: Referral Request    Reason for referral request? pt asking to labs done on her visit , she's   scheduled on 9/6/23 asking for her A1c to be checked and sodium and wants   a lipid panel , or whatever blood work is needed , and a cbc panel (she's   not sure what its called)   Provider patient wants to be referred to(if known):     Provider Phone Number(if known):     Additional Information for Provider?   ---------------------------------------------------------------------------  --------------  Aviva Anderson Monika    [phone]; OK to leave message on voicemail,OK to respond with electronic   message via [facility] portal (only for patients who have registered [employer]   account)  ---------------------------------------------------------------------------  --------------

## 2023-09-05 SDOH — ECONOMIC STABILITY: FOOD INSECURITY: WITHIN THE PAST 12 MONTHS, YOU WORRIED THAT YOUR FOOD WOULD RUN OUT BEFORE YOU GOT MONEY TO BUY MORE.: PATIENT DECLINED

## 2023-09-05 SDOH — ECONOMIC STABILITY: HOUSING INSECURITY
IN THE LAST 12 MONTHS, WAS THERE A TIME WHEN YOU DID NOT HAVE A STEADY PLACE TO SLEEP OR SLEPT IN A SHELTER (INCLUDING NOW)?: NO

## 2023-09-05 SDOH — ECONOMIC STABILITY: TRANSPORTATION INSECURITY
IN THE PAST 12 MONTHS, HAS LACK OF TRANSPORTATION KEPT YOU FROM MEETINGS, WORK, OR FROM GETTING THINGS NEEDED FOR DAILY LIVING?: NO

## 2023-09-05 SDOH — ECONOMIC STABILITY: FOOD INSECURITY: WITHIN THE PAST 12 MONTHS, THE FOOD YOU BOUGHT JUST DIDN'T LAST AND YOU DIDN'T HAVE MONEY TO GET MORE.: PATIENT DECLINED

## 2023-09-05 SDOH — ECONOMIC STABILITY: INCOME INSECURITY: HOW HARD IS IT FOR YOU TO PAY FOR THE VERY BASICS LIKE FOOD, HOUSING, MEDICAL CARE, AND HEATING?: PATIENT DECLINED

## 2023-09-06 ENCOUNTER — OFFICE VISIT (OUTPATIENT)
Age: 73
End: 2023-09-06
Payer: MEDICARE

## 2023-09-06 VITALS
DIASTOLIC BLOOD PRESSURE: 72 MMHG | OXYGEN SATURATION: 96 % | HEART RATE: 76 BPM | BODY MASS INDEX: 34.77 KG/M2 | SYSTOLIC BLOOD PRESSURE: 116 MMHG | WEIGHT: 196.3 LBS

## 2023-09-06 DIAGNOSIS — E83.52 HYPERCALCEMIA: ICD-10-CM

## 2023-09-06 DIAGNOSIS — D32.9 MENINGIOMA (HCC): ICD-10-CM

## 2023-09-06 DIAGNOSIS — E78.00 ELEVATED CHOLESTEROL: ICD-10-CM

## 2023-09-06 DIAGNOSIS — Z85.6 HISTORY OF ACUTE MYELOID LEUKEMIA: ICD-10-CM

## 2023-09-06 DIAGNOSIS — Z94.81 BONE MARROW TRANSPLANT STATUS (HCC): ICD-10-CM

## 2023-09-06 DIAGNOSIS — E55.9 VITAMIN D DEFICIENCY: ICD-10-CM

## 2023-09-06 DIAGNOSIS — I10 ESSENTIAL HYPERTENSION: Primary | ICD-10-CM

## 2023-09-06 DIAGNOSIS — R73.03 PREDIABETES: ICD-10-CM

## 2023-09-06 PROCEDURE — 99214 OFFICE O/P EST MOD 30 MIN: CPT | Performed by: STUDENT IN AN ORGANIZED HEALTH CARE EDUCATION/TRAINING PROGRAM

## 2023-09-06 PROCEDURE — 3078F DIAST BP <80 MM HG: CPT | Performed by: STUDENT IN AN ORGANIZED HEALTH CARE EDUCATION/TRAINING PROGRAM

## 2023-09-06 PROCEDURE — 1123F ACP DISCUSS/DSCN MKR DOCD: CPT | Performed by: STUDENT IN AN ORGANIZED HEALTH CARE EDUCATION/TRAINING PROGRAM

## 2023-09-06 PROCEDURE — 3074F SYST BP LT 130 MM HG: CPT | Performed by: STUDENT IN AN ORGANIZED HEALTH CARE EDUCATION/TRAINING PROGRAM

## 2023-09-06 RX ORDER — ATORVASTATIN CALCIUM 20 MG/1
20 TABLET, FILM COATED ORAL EVERY OTHER DAY
Qty: 45 TABLET | Refills: 3 | Status: SHIPPED | OUTPATIENT
Start: 2023-09-06

## 2023-09-06 NOTE — ASSESSMENT & PLAN NOTE
She reports previous negative work-up  She is concerned that her supplement has calcium in it.   Reassess with labs today

## 2023-09-06 NOTE — PROGRESS NOTES
Progress Note    she is a 68y.o. year old female who presents for evaluation of Follow-up (Prediabetes, fasting for labs) and Hypertension        Assessment/ Plan:     1. Essential hypertension  Assessment & Plan:   Well-controlled, continue current medications  Orders:  -     TSH; Future  -     CBC with Auto Differential; Future  2. Vitamin D deficiency  Assessment & Plan: On supplement, reassess with labs  Orders:  -     Vitamin D 25 Hydroxy; Future  3. Prediabetes  Assessment & Plan:  No current medications, reassess with labs  Orders:  -     Hemoglobin A1C; Future  4. Hypercalcemia  Assessment & Plan:  She reports previous negative work-up  She is concerned that her supplement has calcium in it. Reassess with labs today  5. Elevated cholesterol  Assessment & Plan:  Taking Lipitor 20 mg every other day  Reassess with labs  Healthy diet and exercise encouraged  Orders:  -     Comprehensive Metabolic Panel; Future  -     Lipid Panel; Future  -     atorvastatin (LIPITOR) 20 MG tablet; Take 1 tablet by mouth every other day, Disp-45 tablet, R-3Normal  6. Meningioma [facility])  Assessment & Plan:  Stable on imaging 11/2021  Patient declines repeat imaging at this time. 7. History of acute myeloid leukemia  8. Bone marrow transplant status [facility])  Assessment & Plan:  1988 for AML, no issues since  CBC rechecked today        Patient is fasting for labs today. Return in about 6 months (around 3/6/2024) for follow-up hypertension and prediabetes with fasting labs. I have discussed the diagnosis with the patient and the intended plan as seen in the above orders. The patient has received an after-visit summary and questions were answered concerning future plans. Pt conveyed understanding of plan.     Medication Side Effects and Warnings were discussed with patient        Subjective:     Chief Complaint   Patient presents with    Follow-up     Prediabetes, fasting for labs    Hypertension     She would like to

## 2023-09-06 NOTE — PATIENT INSTRUCTIONS
Lifestyle Changes for Chronic Health Conditions: Care Instructions  Your Care Instructions     If you have diabetes, heart disease, or blood pressure or cholesterol problems, making healthy lifestyle changes can help. Changing your diet, getting more exercise, and getting rid of harmful habits can reduce your risk of heart attack, stroke, and other serious health problems. Even small changes can help. Start with steps that you can take right away. Think about things such as time limits, stress, and temptations that might get in the way, and figure out how you can avoid or overcome them. Work with your doctor to plan lifestyle changes to deal with your health problem. Follow-up care is a key part of your treatment and safety. Be sure to make and go to all appointments, and call your doctor if you are having problems. It's also a good idea to know your test results and keep a list of the medicines you take. How can you care for yourself at home? If your doctor recommends it, get more exercise. Walking is a good choice. Bit by bit, increase the amount you walk every day. Try for at least 30 minutes on most days of the week. You also may want to swim, bike, or do other activities. Eat a healthy diet. Choose fruits, vegetables, whole grains, protein, and low-fat dairy foods. Limit saturated fat and avoid trans fat. Try to limit how much sodium you eat to less than 2,300 milligrams (mg) a day. Lose weight if you are overweight. A loss of just 10 pounds can help. The best way to lose weight and keep it off is to exercise on most days, choose healthy foods, and keep portion sizes under control. Aim to lose no more than 1 pound a week. Do not smoke. Smoking can make most chronic health problems worse. If you need help quitting, talk to your doctor about stop-smoking programs and medicines. These can increase your chances of quitting for good.   Limit alcohol to 2 drinks a day for men and 1 drink a day for

## 2023-09-07 LAB
25(OH)D3+25(OH)D2 SERPL-MCNC: 30.4 NG/ML (ref 30–100)
ALBUMIN SERPL-MCNC: 4.8 G/DL (ref 3.8–4.8)
ALBUMIN/GLOB SERPL: 2.1 {RATIO} (ref 1.2–2.2)
ALP SERPL-CCNC: 66 IU/L (ref 44–121)
ALT SERPL-CCNC: 25 IU/L (ref 0–32)
AST SERPL-CCNC: 28 IU/L (ref 0–40)
BASOPHILS # BLD AUTO: 0 X10E3/UL (ref 0–0.2)
BASOPHILS NFR BLD AUTO: 1 %
BILIRUB SERPL-MCNC: 0.4 MG/DL (ref 0–1.2)
BUN SERPL-MCNC: 20 MG/DL (ref 8–27)
BUN/CREAT SERPL: 22 (ref 12–28)
CALCIUM SERPL-MCNC: 10.9 MG/DL (ref 8.7–10.3)
CHLORIDE SERPL-SCNC: 103 MMOL/L (ref 96–106)
CHOLEST SERPL-MCNC: 160 MG/DL (ref 100–199)
CO2 SERPL-SCNC: 21 MMOL/L (ref 20–29)
CREAT SERPL-MCNC: 0.9 MG/DL (ref 0.57–1)
EGFRCR SERPLBLD CKD-EPI 2021: 68 ML/MIN/1.73
EOSINOPHIL # BLD AUTO: 0.1 X10E3/UL (ref 0–0.4)
EOSINOPHIL NFR BLD AUTO: 1 %
ERYTHROCYTE [DISTWIDTH] IN BLOOD BY AUTOMATED COUNT: 13.8 % (ref 11.7–15.4)
GLOBULIN SER CALC-MCNC: 2.3 G/DL (ref 1.5–4.5)
GLUCOSE SERPL-MCNC: 115 MG/DL (ref 70–99)
HBA1C MFR BLD: 6.2 % (ref 4.8–5.6)
HCT VFR BLD AUTO: 42.4 % (ref 34–46.6)
HDLC SERPL-MCNC: 48 MG/DL
HGB BLD-MCNC: 14.1 G/DL (ref 11.1–15.9)
IMM GRANULOCYTES # BLD AUTO: 0 X10E3/UL (ref 0–0.1)
IMM GRANULOCYTES NFR BLD AUTO: 0 %
IMP & REVIEW OF LAB RESULTS: NORMAL
LDLC SERPL CALC-MCNC: 87 MG/DL (ref 0–99)
LYMPHOCYTES # BLD AUTO: 2.7 X10E3/UL (ref 0.7–3.1)
LYMPHOCYTES NFR BLD AUTO: 37 %
MCH RBC QN AUTO: 30 PG (ref 26.6–33)
MCHC RBC AUTO-ENTMCNC: 33.3 G/DL (ref 31.5–35.7)
MCV RBC AUTO: 90 FL (ref 79–97)
MONOCYTES # BLD AUTO: 0.4 X10E3/UL (ref 0.1–0.9)
MONOCYTES NFR BLD AUTO: 6 %
NEUTROPHILS # BLD AUTO: 4 X10E3/UL (ref 1.4–7)
NEUTROPHILS NFR BLD AUTO: 55 %
PLATELET # BLD AUTO: 254 X10E3/UL (ref 150–450)
POTASSIUM SERPL-SCNC: 4.7 MMOL/L (ref 3.5–5.2)
PROT SERPL-MCNC: 7.1 G/DL (ref 6–8.5)
RBC # BLD AUTO: 4.7 X10E6/UL (ref 3.77–5.28)
SODIUM SERPL-SCNC: 139 MMOL/L (ref 134–144)
TRIGL SERPL-MCNC: 142 MG/DL (ref 0–149)
TSH SERPL DL<=0.005 MIU/L-ACNC: 4.02 UIU/ML (ref 0.45–4.5)
VLDLC SERPL CALC-MCNC: 25 MG/DL (ref 5–40)
WBC # BLD AUTO: 7.2 X10E3/UL (ref 3.4–10.8)

## 2023-12-26 ENCOUNTER — TELEPHONE (OUTPATIENT)
Age: 73
End: 2023-12-26

## 2023-12-26 DIAGNOSIS — E78.00 ELEVATED CHOLESTEROL: ICD-10-CM

## 2023-12-26 RX ORDER — ATORVASTATIN CALCIUM 20 MG/1
20 TABLET, FILM COATED ORAL EVERY OTHER DAY
Qty: 45 TABLET | Refills: 0 | Status: SHIPPED | OUTPATIENT
Start: 2023-12-26

## 2023-12-26 NOTE — TELEPHONE ENCOUNTER
We received a fax refill request for Teodoro Valdez. Please escribe Lipitor Tab to their pharmacy. The pharmacy is correct in the chart and they are requesting a 25 day supply.

## 2024-01-31 ENCOUNTER — TELEPHONE (OUTPATIENT)
Age: 74
End: 2024-01-31

## 2024-01-31 DIAGNOSIS — E78.00 ELEVATED CHOLESTEROL: ICD-10-CM

## 2024-01-31 DIAGNOSIS — I10 ESSENTIAL HYPERTENSION: Primary | ICD-10-CM

## 2024-01-31 NOTE — TELEPHONE ENCOUNTER
Shantel Mitchell needs a refill of bisoprolol-hydroCHLOROthiazide (ZIAC) 5-6.25 MG per tablet .  They have ? pills/supply left and are requesting a 90 day supply with refills.  Pharmacy has been updated in the chart. Patient was advised or scheduled an appointment for the future and to request refills thru the [employer] [facility] or by requesting a refill from their pharmacy in the future.  Patient was also advised to check with their pharmacy for status of when refills are available.    Shantel Mitchell needs a refill of fenofibrate micronized (LOFIBRA) 134 MG capsule .  They have ? pills/supply left and are requesting a 90 day supply with refills.  Pharmacy has been updated in the chart. Patient was advised or scheduled an appointment for the future and to request refills thru the [facility] or by requesting a refill from their pharmacy in the future.  Patient was also advised to check with their pharmacy for status of when refills are available.

## 2024-02-01 RX ORDER — FENOFIBRATE 134 MG/1
134 CAPSULE ORAL EVERY MORNING
Qty: 90 CAPSULE | Refills: 0 | Status: SHIPPED | OUTPATIENT
Start: 2024-02-01

## 2024-02-01 RX ORDER — BISOPROLOL FUMARATE AND HYDROCHLOROTHIAZIDE 5; 6.25 MG/1; MG/1
1 TABLET ORAL DAILY
Qty: 90 TABLET | Refills: 0 | Status: SHIPPED | OUTPATIENT
Start: 2024-02-01

## 2024-03-14 NOTE — MR AVS SNAPSHOT
GOAL # 1 SUNDAY MONDAY TUESDAY WEDNESDAY THURSDAY FRIDAY SATURDAY     Blood Pressure every morning                                  WEEK 1                                                        WEEK 2                                                        WEEK 3                                                       WEEK 4                                                       WEEK 5            GOAL # 2 SUNDAY MONDAY TUESDAY WEDNESDAY THURSDAY FRIDAY SATURDAY     Put compression on lower legs first thing in the morning, 6 mornings a week.                                WEEK 1                               WEEK 2                               WEEK 3                               WEEK 4                               WEEK 5            GOAL # 3 SUNDAY MONDAY TUESDAY WEDNESDAY THURSDAY FRIDAY SATURDAY     Diuretic after putting on compression                                WEEK 1                                WEEK 2                                WEEK 3                                WEEK 4                                WEEK 5          GOAL # 4 SUNDAY MONDAY TUESDAY WEDNESDAY THURSDAY FRIDAY SATURDAY     Put on compression capris 3 days a week                                WEEK 1                                WEEK 2                                WEEK 3                                WEEK 4                                WEEK 5            GOAL # 5 SUNDAY MONDAY TUESDAY WEDNESDAY THURSDAY FRIDAY SATURDAY     Use pump 4 days a week.                                   WEEK 1                                WEEK 2                                WEEK 3                                WEEK 4                                WEEK 5             Visit Information Date & Time Provider Department Dept. Phone Encounter #  
 8/23/2017  8:50 AM Darryle Mixer, MD [address] [facility] [phone] [id number] Upcoming Health Maintenance Date Due DTaP/Tdap/Td series (1 - Tdap) 8/29/1972 INFLUENZA AGE 9 TO ADULT 8/1/2017 GLAUCOMA SCREENING Q2Y 2/1/2018 Pneumococcal 65+ High/Highest Risk (2 of 2 - PCV13) 5/31/2018 FOBT Q 1 YEAR AGE 50-75 5/31/2018 MEDICARE YEARLY EXAM 8/24/2018 BREAST CANCER SCRN MAMMOGRAM 11/29/2018 Allergies as of 8/23/2017  Review Complete On: 8/23/2017 By: Darryle Mixer, MD  
  
 Severity Noted Reaction Type Reactions Codeine  10/18/2016    Nausea and Vomiting Erythromycin  10/18/2016    Nausea and Vomiting Current Immunizations  Reviewed on 5/31/2017 Name Date Influenza High Dose Vaccine PF 10/18/2016 Pneumococcal Polysaccharide (PPSV-23) 5/31/2017 Zoster Vaccine, Live 1/1/2015 Not reviewed this visit You Were Diagnosed With   
  
 Codes Comments Medicare annual wellness visit, subsequent    -  Primary ICD-10-CM: Z00.00 ICD-9-CM: V70.0 Bladder infection     ICD-10-CM: N30.90 ICD-9-CM: 595.9 Vitamin D deficiency     ICD-10-CM: E55.9 ICD-9-CM: 268.9 Hypercalcemia     ICD-10-CM: A72.01 
ICD-9-CM: 275.42 Elevated blood sugar     ICD-10-CM: R73.9 ICD-9-CM: 790.29 Vitals BP Pulse Temp Resp Height(growth percentile) Weight(growth percentile) 124/82 (BP 1 [facility]: Left arm, BP Patient Position: Sitting) 74 98.6 °F (37 °C) (Oral) 17 5' 4\" (1.626 m) 198 lb 9.6 oz (90.1 kg) SpO2 BMI OB Status Smoking Status 94% 34.09 kg/m2 Postmenopausal Former Smoker Vitals History BMI and BSA Data Body Mass Index Body Surface Area 34.09 kg/m 2 2.02 m 2 Preferred Pharmacy Pharmacy Name Phone [facility] [id number] [phone] Your Updated Medication List  
  
   
This list is accurate as of: 8/23/17  8:58 AM.  Always use your most recent med list.  
  
  
  
  
 aspirin delayed-release 81 mg tablet Take  by mouth daily. atorvastatin 40 mg tablet Commonly known as:  LIPITOR Take 1 Tab by mouth daily. bisoprolol-hydroCHLOROthiazide 5-6.25 mg per tablet Commonly known as:  [facility] Take 1 Tab by mouth daily. Blood-Glucose Meter monitoring kit Please check blood sugar once daily. E11.9 Please provide with a freestyle product. CINNAMON 500 mg Cap Generic drug:  cinnamon bark TID  
  
 fenofibrate micronized 134 mg capsule Commonly known as:  LOFIBRA Take 1 Cap by mouth every morning. FISH OIL PO Take 1,200 mg by mouth daily. Twice daily  
  
 glucose blood VI test strips strip Commonly known as:  ASCENSIA AUTODISC VI, ONE TOUCH ULTRA TEST VI Please check blood sugar once daily. E11.9 Please provide with a freestyle product. Lancets Misc Please check blood sugar once daily. E11.9 Please provide with a freestyle product. nitrofurantoin (macrocrystal-monohydrate) 100 mg capsule Commonly known as:  MACROBID Take 1 Cap by mouth two (2) times a day for 7 days. PROBIOTIC 4X 10-15 mg Tbec Generic drug:  B.infantis-B.ani-B.long-B.bifi Take  by mouth. VITAMIN D3 1,000 unit Cap Generic drug:  cholecalciferol Take 2,000 Units by mouth daily. Prescriptions Sent to Pharmacy Refills  
 nitrofurantoin, macrocrystal-monohydrate, (MACROBID) 100 mg capsule 0 Sig: Take 1 Cap by mouth two (2) times a day for 7 days. Class: Normal  
 Pharmacy: [address] Ph #: [phone] Route: Oral  
  
We Performed the Following AMB POC URINALYSIS DIP STICK AUTO W/O MICRO [83113 CPT(R)] CBC WITH AUTOMATED DIFF [26418 CPT(R)] CULTURE, URINE [id number] CPT(R)] HEMOGLOBIN A1C WITH EAG [20730 CPT(R)] LIPID PANEL [70797 CPT(R)] METABOLIC PANEL, COMPREHENSIVE [68752 CPT(R)] TSH 3RD GENERATION [39201 CPT(R)] VITAMIN D, 25 HYDROXY [id number] CPT(R)] Patient Instructions Medicare Wellness Visit, Female The best way to live healthy is to have a healthy lifestyle by eating a well-balanced diet, exercising regularly, limiting alcohol and stopping smoking. Regular physical exams and screening tests are another way to keep healthy. Preventive exams provided by your health care provider can find health problems before they become diseases or illnesses. Preventive services including immunizations, screening tests, monitoring and exams can help you take care of your own health. All people over age 72 should have a pneumovax  and and a prevnar shot to prevent pneumonia. These are once in a lifetime unless you and your provider decide differently. All people over 65 should have a yearly flu shot and a tetanus vaccine every 10 years. A bone mass density to screen for osteoporosis or thinning of the bones should be done every 2 years after 65. Screening for diabetes mellitus with a blood sugar test should be done every year. Glaucoma is a disease of the eye due to increased ocular pressure that can lead to blindness and it should be done every year by an eye professional. 
 
Cardiovascular screening tests that check for elevated lipids (fatty part of blood) which can lead to heart disease and strokes should be done every 5 years. Colorectal screening that evaluates for blood or polyps in your colon should be done yearly as a stool test or every five years as a flexible sigmoidoscope or every 10 years as a colonoscopy up to age 76. Breast cancer screening with a mammogram is recommended biennially  for women age 54-69.  
 
Screening for cervical cancer with a pap smear and pelvic exam is recommended for women after age 72 years every 2 years up to age 79 or when the provider and patient decide to stop. If there is a history of cervical abnormalities or other increased risk for cancer then the test is recommended yearly. Hepatitis C screening is also recommended for anyone born between [birth year] through [address]. A shingles vaccine is also recommended once in a lifetime after age 61. Your Medicare Wellness Exam is recommended annually. Here is a list of your current Health Maintenance items with a due date: 
Health Maintenance Due Topic Date Due  
 DTaP/Tdap/Td  (1 - Tdap) 08/29/1972  Flu Vaccine  08/01/2017 Introducing [facility] SERVICES! Dear Sarah Baumann: 
Thank you for requesting a [employer] account. Our records indicate that you already have an active [employer] account. You can access your account anytime at [email or web address] Did you know that you can access your hospital and ER discharge instructions at any time in [employer]? You can also review all of your test results from your hospital stay or ER visit. Additional Information If you have questions, please visit the Frequently Asked Questions section of the [employer] website at [email or web address] Remember, [facility] [employer] is NOT to be used for urgent needs. For medical emergencies, dial 911. Now available from your iPhone and Android! Please provide this summary of care documentation to your next provider. Your primary care clinician is listed as Latasha Paul. If you have any questions after today's visit, please call [phone].

## 2024-06-03 ENCOUNTER — APPOINTMENT (OUTPATIENT)
Facility: HOSPITAL | Age: 74
End: 2024-06-03
Payer: MEDICARE

## 2024-06-03 ENCOUNTER — HOSPITAL ENCOUNTER (OUTPATIENT)
Facility: HOSPITAL | Age: 74
Setting detail: OBSERVATION
Discharge: HOME | End: 2024-06-05
Attending: STUDENT IN AN ORGANIZED HEALTH CARE EDUCATION/TRAINING PROGRAM | Admitting: SURGERY
Payer: MEDICARE

## 2024-06-03 DIAGNOSIS — K35.200 ACUTE APPENDICITIS WITH GENERALIZED PERITONITIS WITHOUT GANGRENE, PERFORATION, OR ABSCESS: Primary | ICD-10-CM

## 2024-06-03 DIAGNOSIS — K35.80 ACUTE APPENDICITIS, UNSPECIFIED ACUTE APPENDICITIS TYPE: ICD-10-CM

## 2024-06-03 LAB
ALBUMIN SERPL-MCNC: 4.4 G/DL (ref 3.5–5.2)
ALBUMIN/GLOB SERPL: 1.3 (ref 1.1–2.2)
ALP SERPL-CCNC: 72 U/L (ref 35–104)
ALT SERPL-CCNC: 21 U/L (ref 10–35)
ANION GAP SERPL CALC-SCNC: 13 MMOL/L (ref 5–15)
APPEARANCE UR: ABNORMAL
AST SERPL-CCNC: 29 U/L (ref 10–35)
BACTERIA URNS QL MICRO: ABNORMAL /HPF
BASOPHILS # BLD: 0 K/UL (ref 0–1)
BASOPHILS NFR BLD: 0 % (ref 0–1)
BILIRUB SERPL-MCNC: 0.7 MG/DL (ref 0.2–1)
BILIRUB UR QL: NEGATIVE
BUN SERPL-MCNC: 16 MG/DL (ref 8–23)
BUN/CREAT SERPL: 19 (ref 12–20)
CALCIUM SERPL-MCNC: 10.4 MG/DL (ref 8.8–10.2)
CHLORIDE SERPL-SCNC: 104 MMOL/L (ref 98–107)
CO2 SERPL-SCNC: 20 MMOL/L (ref 22–29)
COLOR UR: ABNORMAL
COMMENT:: NORMAL
CREAT SERPL-MCNC: 0.83 MG/DL (ref 0.5–0.9)
DIFFERENTIAL METHOD BLD: ABNORMAL
EOSINOPHIL # BLD: 0 K/UL (ref 0–0.4)
EOSINOPHIL NFR BLD: 0 %
EPITH CASTS URNS QL MICRO: ABNORMAL /LPF
ERYTHROCYTE [DISTWIDTH] IN BLOOD BY AUTOMATED COUNT: 14.6 % (ref 11.5–14.5)
GLOBULIN SER CALC-MCNC: 3.3 G/DL (ref 2–4)
GLUCOSE SERPL-MCNC: 137 MG/DL (ref 65–100)
GLUCOSE UR STRIP.AUTO-MCNC: NEGATIVE MG/DL
HCT VFR BLD AUTO: 43.3 % (ref 35–47)
HGB BLD-MCNC: 14.4 G/DL (ref 11.5–16)
HGB UR QL STRIP: ABNORMAL
IMM GRANULOCYTES # BLD AUTO: 0 K/UL (ref 0–0.04)
IMM GRANULOCYTES NFR BLD AUTO: 0 % (ref 0–0.5)
KETONES UR QL STRIP.AUTO: NEGATIVE MG/DL
LEUKOCYTE ESTERASE UR QL STRIP.AUTO: ABNORMAL
LYMPHOCYTES # BLD: 2.8 K/UL (ref 0.8–3.5)
LYMPHOCYTES NFR BLD: 18 % (ref 12–49)
MCH RBC QN AUTO: 29.3 PG (ref 26–34)
MCHC RBC AUTO-ENTMCNC: 33.3 G/DL (ref 30–36.5)
MCV RBC AUTO: 88 FL (ref 80–99)
MONOCYTES # BLD: 1.1 K/UL (ref 0–1)
MONOCYTES NFR BLD: 7 % (ref 5–13)
NEUTS SEG # BLD: 11.4 K/UL (ref 1.8–8)
NEUTS SEG NFR BLD: 75 % (ref 32–75)
NITRITE UR QL STRIP.AUTO: NEGATIVE
NRBC # BLD: 0 K/UL (ref 0–0.01)
NRBC BLD-RTO: 0 PER 100 WBC
PH UR STRIP: 5.5 (ref 5–8)
PLATELET # BLD AUTO: 260 K/UL (ref 150–400)
PMV BLD AUTO: 8.6 FL (ref 8.9–12.9)
POTASSIUM SERPL-SCNC: 4.3 MMOL/L (ref 3.5–5.1)
PROT SERPL-MCNC: 7.7 G/DL (ref 6.4–8.3)
PROT UR STRIP-MCNC: NEGATIVE MG/DL
RBC # BLD AUTO: 4.92 M/UL (ref 3.8–5.2)
RBC #/AREA URNS HPF: ABNORMAL /HPF
SODIUM SERPL-SCNC: 137 MMOL/L (ref 136–145)
SP GR UR REFRACTOMETRY: 1.02 (ref 1–1.03)
SPECIMEN HOLD: NORMAL
URINE CULTURE IF INDICATED: ABNORMAL
UROBILINOGEN UR QL STRIP.AUTO: 0.2 EU/DL (ref 0.2–1)
WBC # BLD AUTO: 15.3 K/UL (ref 3.6–11)
WBC URNS QL MICRO: ABNORMAL /HPF (ref 0–4)

## 2024-06-03 PROCEDURE — 80053 COMPREHEN METABOLIC PANEL: CPT

## 2024-06-03 PROCEDURE — 81001 URINALYSIS AUTO W/SCOPE: CPT

## 2024-06-03 PROCEDURE — 36415 COLL VENOUS BLD VENIPUNCTURE: CPT

## 2024-06-03 PROCEDURE — 85025 COMPLETE CBC W/AUTO DIFF WBC: CPT

## 2024-06-03 PROCEDURE — 74177 CT ABD & PELVIS W/CONTRAST: CPT

## 2024-06-03 PROCEDURE — 6360000004 HC RX CONTRAST MEDICATION: Performed by: STUDENT IN AN ORGANIZED HEALTH CARE EDUCATION/TRAINING PROGRAM

## 2024-06-03 PROCEDURE — 99285 EMERGENCY DEPT VISIT HI MDM: CPT

## 2024-06-03 RX ORDER — ONDANSETRON 2 MG/ML
4 INJECTION INTRAMUSCULAR; INTRAVENOUS EVERY 6 HOURS PRN
Status: DISCONTINUED | OUTPATIENT
Start: 2024-06-03 | End: 2024-06-05 | Stop reason: HOSPADM

## 2024-06-03 RX ORDER — KETOROLAC TROMETHAMINE 30 MG/ML
15 INJECTION, SOLUTION INTRAMUSCULAR; INTRAVENOUS ONCE
Status: COMPLETED | OUTPATIENT
Start: 2024-06-03 | End: 2024-06-04

## 2024-06-03 RX ORDER — SODIUM CHLORIDE, SODIUM LACTATE, POTASSIUM CHLORIDE, CALCIUM CHLORIDE 600; 310; 30; 20 MG/100ML; MG/100ML; MG/100ML; MG/100ML
INJECTION, SOLUTION INTRAVENOUS CONTINUOUS
Status: DISCONTINUED | OUTPATIENT
Start: 2024-06-03 | End: 2024-06-05 | Stop reason: HOSPADM

## 2024-06-03 RX ORDER — KETOROLAC TROMETHAMINE 30 MG/ML
15 INJECTION, SOLUTION INTRAMUSCULAR; INTRAVENOUS EVERY 6 HOURS PRN
Status: DISCONTINUED | OUTPATIENT
Start: 2024-06-03 | End: 2024-06-05 | Stop reason: HOSPADM

## 2024-06-03 RX ORDER — MORPHINE SULFATE 2 MG/ML
2 INJECTION, SOLUTION INTRAMUSCULAR; INTRAVENOUS EVERY 4 HOURS PRN
Status: DISCONTINUED | OUTPATIENT
Start: 2024-06-03 | End: 2024-06-05 | Stop reason: HOSPADM

## 2024-06-03 RX ORDER — ACETAMINOPHEN 325 MG/1
650 TABLET ORAL EVERY 4 HOURS PRN
Status: DISCONTINUED | OUTPATIENT
Start: 2024-06-03 | End: 2024-06-05 | Stop reason: HOSPADM

## 2024-06-03 RX ADMIN — IOPAMIDOL 100 ML: 755 INJECTION, SOLUTION INTRAVENOUS at 22:26

## 2024-06-03 ASSESSMENT — LIFESTYLE VARIABLES
HOW MANY STANDARD DRINKS CONTAINING ALCOHOL DO YOU HAVE ON A TYPICAL DAY: PATIENT DOES NOT DRINK
HOW OFTEN DO YOU HAVE A DRINK CONTAINING ALCOHOL: NEVER

## 2024-06-03 ASSESSMENT — PAIN - FUNCTIONAL ASSESSMENT: PAIN_FUNCTIONAL_ASSESSMENT: NONE - DENIES PAIN

## 2024-06-04 ENCOUNTER — ANESTHESIA (OUTPATIENT)
Facility: HOSPITAL | Age: 74
End: 2024-06-04
Payer: MEDICARE

## 2024-06-04 ENCOUNTER — ANESTHESIA EVENT (OUTPATIENT)
Facility: HOSPITAL | Age: 74
End: 2024-06-04
Payer: MEDICARE

## 2024-06-04 PROCEDURE — 96365 THER/PROPH/DIAG IV INF INIT: CPT

## 2024-06-04 PROCEDURE — 3600000003 HC SURGERY LEVEL 3 BASE: Performed by: SURGERY

## 2024-06-04 PROCEDURE — 6360000002 HC RX W HCPCS: Performed by: STUDENT IN AN ORGANIZED HEALTH CARE EDUCATION/TRAINING PROGRAM

## 2024-06-04 PROCEDURE — 6360000002 HC RX W HCPCS: Performed by: SURGERY

## 2024-06-04 PROCEDURE — 2500000003 HC RX 250 WO HCPCS: Performed by: NURSE ANESTHETIST, CERTIFIED REGISTERED

## 2024-06-04 PROCEDURE — 88304 TISSUE EXAM BY PATHOLOGIST: CPT

## 2024-06-04 PROCEDURE — 94761 N-INVAS EAR/PLS OXIMETRY MLT: CPT

## 2024-06-04 PROCEDURE — 3700000001 HC ADD 15 MINUTES (ANESTHESIA): Performed by: SURGERY

## 2024-06-04 PROCEDURE — 6370000000 HC RX 637 (ALT 250 FOR IP): Performed by: ANESTHESIOLOGY

## 2024-06-04 PROCEDURE — 2580000003 HC RX 258: Performed by: STUDENT IN AN ORGANIZED HEALTH CARE EDUCATION/TRAINING PROGRAM

## 2024-06-04 PROCEDURE — 2580000003 HC RX 258: Performed by: SURGERY

## 2024-06-04 PROCEDURE — 2720000010 HC SURG SUPPLY STERILE: Performed by: SURGERY

## 2024-06-04 PROCEDURE — 3700000000 HC ANESTHESIA ATTENDED CARE: Performed by: SURGERY

## 2024-06-04 PROCEDURE — 7100000001 HC PACU RECOVERY - ADDTL 15 MIN: Performed by: SURGERY

## 2024-06-04 PROCEDURE — 2709999900 HC NON-CHARGEABLE SUPPLY: Performed by: SURGERY

## 2024-06-04 PROCEDURE — 3600000013 HC SURGERY LEVEL 3 ADDTL 15MIN: Performed by: SURGERY

## 2024-06-04 PROCEDURE — 7100000000 HC PACU RECOVERY - FIRST 15 MIN: Performed by: SURGERY

## 2024-06-04 PROCEDURE — G0378 HOSPITAL OBSERVATION PER HR: HCPCS

## 2024-06-04 PROCEDURE — C9290 INJ, BUPIVACAINE LIPOSOME: HCPCS | Performed by: SURGERY

## 2024-06-04 PROCEDURE — 6360000002 HC RX W HCPCS: Performed by: NURSE ANESTHETIST, CERTIFIED REGISTERED

## 2024-06-04 PROCEDURE — 96375 TX/PRO/DX INJ NEW DRUG ADDON: CPT

## 2024-06-04 RX ORDER — PROPOFOL 10 MG/ML
INJECTION, EMULSION INTRAVENOUS PRN
Status: DISCONTINUED | OUTPATIENT
Start: 2024-06-04 | End: 2024-06-04 | Stop reason: SDUPTHER

## 2024-06-04 RX ORDER — KETOROLAC TROMETHAMINE 30 MG/ML
INJECTION, SOLUTION INTRAMUSCULAR; INTRAVENOUS PRN
Status: DISCONTINUED | OUTPATIENT
Start: 2024-06-04 | End: 2024-06-04 | Stop reason: SDUPTHER

## 2024-06-04 RX ORDER — OXYCODONE HYDROCHLORIDE 5 MG/1
10 TABLET ORAL EVERY 4 HOURS PRN
Status: DISCONTINUED | OUTPATIENT
Start: 2024-06-04 | End: 2024-06-05 | Stop reason: HOSPADM

## 2024-06-04 RX ORDER — FENTANYL CITRATE 50 UG/ML
100 INJECTION, SOLUTION INTRAMUSCULAR; INTRAVENOUS
Status: DISCONTINUED | OUTPATIENT
Start: 2024-06-04 | End: 2024-06-04 | Stop reason: HOSPADM

## 2024-06-04 RX ORDER — ONDANSETRON 4 MG/1
4 TABLET, ORALLY DISINTEGRATING ORAL 3 TIMES DAILY PRN
Qty: 14 TABLET | Refills: 0 | Status: SHIPPED | OUTPATIENT
Start: 2024-06-04

## 2024-06-04 RX ORDER — SODIUM CHLORIDE, SODIUM LACTATE, POTASSIUM CHLORIDE, CALCIUM CHLORIDE 600; 310; 30; 20 MG/100ML; MG/100ML; MG/100ML; MG/100ML
INJECTION, SOLUTION INTRAVENOUS CONTINUOUS
Status: DISCONTINUED | OUTPATIENT
Start: 2024-06-04 | End: 2024-06-04 | Stop reason: HOSPADM

## 2024-06-04 RX ORDER — PHENYLEPHRINE HCL IN 0.9% NACL 0.4MG/10ML
SYRINGE (ML) INTRAVENOUS PRN
Status: DISCONTINUED | OUTPATIENT
Start: 2024-06-04 | End: 2024-06-04 | Stop reason: SDUPTHER

## 2024-06-04 RX ORDER — DEXAMETHASONE SODIUM PHOSPHATE 4 MG/ML
INJECTION, SOLUTION INTRA-ARTICULAR; INTRALESIONAL; INTRAMUSCULAR; INTRAVENOUS; SOFT TISSUE PRN
Status: DISCONTINUED | OUTPATIENT
Start: 2024-06-04 | End: 2024-06-04 | Stop reason: SDUPTHER

## 2024-06-04 RX ORDER — ONDANSETRON 2 MG/ML
INJECTION INTRAMUSCULAR; INTRAVENOUS PRN
Status: DISCONTINUED | OUTPATIENT
Start: 2024-06-04 | End: 2024-06-04 | Stop reason: SDUPTHER

## 2024-06-04 RX ORDER — SODIUM CHLORIDE, SODIUM LACTATE, POTASSIUM CHLORIDE, CALCIUM CHLORIDE 600; 310; 30; 20 MG/100ML; MG/100ML; MG/100ML; MG/100ML
INJECTION, SOLUTION INTRAVENOUS CONTINUOUS
Status: CANCELLED | OUTPATIENT
Start: 2024-06-04

## 2024-06-04 RX ORDER — LIDOCAINE HYDROCHLORIDE 20 MG/ML
INJECTION, SOLUTION EPIDURAL; INFILTRATION; INTRACAUDAL; PERINEURAL PRN
Status: DISCONTINUED | OUTPATIENT
Start: 2024-06-04 | End: 2024-06-04 | Stop reason: SDUPTHER

## 2024-06-04 RX ORDER — LIDOCAINE HYDROCHLORIDE 10 MG/ML
1 INJECTION, SOLUTION EPIDURAL; INFILTRATION; INTRACAUDAL; PERINEURAL
Status: DISCONTINUED | OUTPATIENT
Start: 2024-06-04 | End: 2024-06-04 | Stop reason: HOSPADM

## 2024-06-04 RX ORDER — MIDAZOLAM HYDROCHLORIDE 1 MG/ML
INJECTION INTRAMUSCULAR; INTRAVENOUS PRN
Status: DISCONTINUED | OUTPATIENT
Start: 2024-06-04 | End: 2024-06-04 | Stop reason: SDUPTHER

## 2024-06-04 RX ORDER — HYDROCODONE BITARTRATE AND ACETAMINOPHEN 5; 325 MG/1; MG/1
1 TABLET ORAL EVERY 6 HOURS PRN
Qty: 10 TABLET | Refills: 0 | Status: SHIPPED | OUTPATIENT
Start: 2024-06-04 | End: 2024-06-07

## 2024-06-04 RX ORDER — ROCURONIUM BROMIDE 10 MG/ML
INJECTION, SOLUTION INTRAVENOUS PRN
Status: DISCONTINUED | OUTPATIENT
Start: 2024-06-04 | End: 2024-06-04 | Stop reason: SDUPTHER

## 2024-06-04 RX ORDER — SCOLOPAMINE TRANSDERMAL SYSTEM 1 MG/1
1 PATCH, EXTENDED RELEASE TRANSDERMAL
Status: DISCONTINUED | OUTPATIENT
Start: 2024-06-04 | End: 2024-06-05 | Stop reason: HOSPADM

## 2024-06-04 RX ORDER — ONDANSETRON 2 MG/ML
4 INJECTION INTRAMUSCULAR; INTRAVENOUS
Status: CANCELLED | OUTPATIENT
Start: 2024-06-04 | End: 2024-06-05

## 2024-06-04 RX ORDER — MIDAZOLAM HYDROCHLORIDE 2 MG/2ML
2 INJECTION, SOLUTION INTRAMUSCULAR; INTRAVENOUS
Status: DISCONTINUED | OUTPATIENT
Start: 2024-06-04 | End: 2024-06-04 | Stop reason: HOSPADM

## 2024-06-04 RX ORDER — FENTANYL CITRATE 50 UG/ML
INJECTION, SOLUTION INTRAMUSCULAR; INTRAVENOUS PRN
Status: DISCONTINUED | OUTPATIENT
Start: 2024-06-04 | End: 2024-06-04 | Stop reason: SDUPTHER

## 2024-06-04 RX ORDER — DIPHENHYDRAMINE HYDROCHLORIDE 50 MG/ML
12.5 INJECTION INTRAMUSCULAR; INTRAVENOUS
Status: CANCELLED | OUTPATIENT
Start: 2024-06-04 | End: 2024-06-05

## 2024-06-04 RX ORDER — OXYCODONE HYDROCHLORIDE 5 MG/1
5 TABLET ORAL EVERY 4 HOURS PRN
Status: DISCONTINUED | OUTPATIENT
Start: 2024-06-04 | End: 2024-06-05 | Stop reason: HOSPADM

## 2024-06-04 RX ORDER — NALOXONE HYDROCHLORIDE 0.4 MG/ML
INJECTION, SOLUTION INTRAMUSCULAR; INTRAVENOUS; SUBCUTANEOUS PRN
Status: CANCELLED | OUTPATIENT
Start: 2024-06-04

## 2024-06-04 RX ADMIN — DEXAMETHASONE SODIUM PHOSPHATE 4 MG: 4 INJECTION, SOLUTION INTRAMUSCULAR; INTRAVENOUS at 12:20

## 2024-06-04 RX ADMIN — SODIUM CHLORIDE, POTASSIUM CHLORIDE, SODIUM LACTATE AND CALCIUM CHLORIDE: 600; 310; 30; 20 INJECTION, SOLUTION INTRAVENOUS at 00:07

## 2024-06-04 RX ADMIN — SODIUM CHLORIDE, POTASSIUM CHLORIDE, SODIUM LACTATE AND CALCIUM CHLORIDE: 600; 310; 30; 20 INJECTION, SOLUTION INTRAVENOUS at 01:53

## 2024-06-04 RX ADMIN — PIPERACILLIN AND TAZOBACTAM 3375 MG: 3; .375 INJECTION, POWDER, LYOPHILIZED, FOR SOLUTION INTRAVENOUS at 06:55

## 2024-06-04 RX ADMIN — Medication 120 MCG: at 12:34

## 2024-06-04 RX ADMIN — Medication 80 MCG: at 12:24

## 2024-06-04 RX ADMIN — PIPERACILLIN AND TAZOBACTAM 3375 MG: 3; .375 INJECTION, POWDER, LYOPHILIZED, FOR SOLUTION INTRAVENOUS at 21:24

## 2024-06-04 RX ADMIN — KETOROLAC TROMETHAMINE 15 MG: 30 INJECTION, SOLUTION INTRAMUSCULAR at 06:23

## 2024-06-04 RX ADMIN — PROPOFOL 50 MCG/KG/MIN: 10 INJECTION, EMULSION INTRAVENOUS at 12:20

## 2024-06-04 RX ADMIN — FENTANYL CITRATE 50 MCG: 50 INJECTION, SOLUTION INTRAMUSCULAR; INTRAVENOUS at 13:04

## 2024-06-04 RX ADMIN — PROPOFOL 150 MG: 10 INJECTION, EMULSION INTRAVENOUS at 12:11

## 2024-06-04 RX ADMIN — SODIUM CHLORIDE, POTASSIUM CHLORIDE, SODIUM LACTATE AND CALCIUM CHLORIDE: 600; 310; 30; 20 INJECTION, SOLUTION INTRAVENOUS at 06:56

## 2024-06-04 RX ADMIN — SUGAMMADEX 200 MG: 100 INJECTION, SOLUTION INTRAVENOUS at 12:58

## 2024-06-04 RX ADMIN — Medication 80 MCG: at 12:17

## 2024-06-04 RX ADMIN — FENTANYL CITRATE 50 MCG: 50 INJECTION, SOLUTION INTRAMUSCULAR; INTRAVENOUS at 12:11

## 2024-06-04 RX ADMIN — PIPERACILLIN AND TAZOBACTAM 4500 MG: 4; .5 INJECTION, POWDER, LYOPHILIZED, FOR SOLUTION INTRAVENOUS; PARENTERAL at 00:07

## 2024-06-04 RX ADMIN — MIDAZOLAM HYDROCHLORIDE 2 MG: 1 INJECTION, SOLUTION INTRAMUSCULAR; INTRAVENOUS at 12:05

## 2024-06-04 RX ADMIN — ONDANSETRON 4 MG: 2 INJECTION INTRAMUSCULAR; INTRAVENOUS at 12:20

## 2024-06-04 RX ADMIN — KETOROLAC TROMETHAMINE 30 MG: 30 INJECTION, SOLUTION INTRAMUSCULAR at 12:44

## 2024-06-04 RX ADMIN — LIDOCAINE HYDROCHLORIDE 80 MG: 20 INJECTION, SOLUTION EPIDURAL; INFILTRATION; INTRACAUDAL; PERINEURAL at 12:11

## 2024-06-04 RX ADMIN — KETOROLAC TROMETHAMINE 15 MG: 30 INJECTION, SOLUTION INTRAMUSCULAR at 00:10

## 2024-06-04 RX ADMIN — ROCURONIUM BROMIDE 35 MG: 10 INJECTION INTRAVENOUS at 12:17

## 2024-06-04 RX ADMIN — ROCURONIUM BROMIDE 5 MG: 10 INJECTION INTRAVENOUS at 12:35

## 2024-06-04 RX ADMIN — Medication 80 MCG: at 12:29

## 2024-06-04 RX ADMIN — Medication 160 MCG: at 13:04

## 2024-06-04 RX ADMIN — PIPERACILLIN AND TAZOBACTAM 3375 MG: 3; .375 INJECTION, POWDER, LYOPHILIZED, FOR SOLUTION INTRAVENOUS at 12:15

## 2024-06-04 ASSESSMENT — PAIN DESCRIPTION - ORIENTATION: ORIENTATION: MID

## 2024-06-04 ASSESSMENT — PAIN - FUNCTIONAL ASSESSMENT: PAIN_FUNCTIONAL_ASSESSMENT: NONE - DENIES PAIN

## 2024-06-04 ASSESSMENT — PAIN DESCRIPTION - LOCATION: LOCATION: ABDOMEN

## 2024-06-04 ASSESSMENT — PAIN DESCRIPTION - DESCRIPTORS: DESCRIPTORS: DISCOMFORT

## 2024-06-04 ASSESSMENT — ENCOUNTER SYMPTOMS: SHORTNESS OF BREATH: 0

## 2024-06-04 NOTE — OP NOTE
Operative Note      Patient: Shantel Mitchell  YOB: 1950  MRN: [id number]    Date of Procedure: 6/4/2024    Pre-Op Diagnosis Codes:     * Appendicitis, unspecified appendicitis type [K37]    Post-Op Diagnosis: Same       Procedure(s):  APPENDECTOMY LAPAROSCOPIC    Surgeon(s):  Daniel Rae MD    Assistant:  Surgical Assistant: Jasper Toth    Anesthesia: General    Estimated Blood Loss (mL): Minimal    Complications: None    Specimens:   ID Type Source Tests Collected by Time Destination   1 : appendix Tissue Appendix SURGICAL PATHOLOGY Daniel Rae MD 6/4/2024 1248      Implants:  * No implants in log *      Drains: * No LDAs found *    Findings:  Infection Present At Time Of Surgery (PATOS) (choose all levels that have infection present):  No infection present  Other Findings: Gangrenous non-perforated appendix    Electronically signed by Daniel Rae MD on 6/4/2024 at 3:15 PM      Procedure:  After consent was obtained, the patient was taken back to the operating room and placed in a supine position. After induction of general anesthesia and endotracheal intubation, the abdomen was prepped and draped in normal sterile fashion and left arm tucked.     The patient is being treated for appendicitis with therapeutic antibiotics.  Appropriate pre-incision antibiotic therapy was verified in the EMR as given with the team during pre-incision time out, and antibiotics were given 30 minutes prior to skin incision.     Pre-incision subcutaneous local analgesia was instilled in the pre-planned port site tissues.  The first incision was made a minute later. Through the first 5mm right para-umbilical horizontal incision, the abdomen was entered under direct camera vision with a 5mm tissue dissection port. Pneumoperitoneum was established and maintained at 15mm Hg.  I looked to the underlying bowel or omentum adjacent to the entry point for injury and it was undisturbed. 5mm horizontal left

## 2024-06-04 NOTE — CARE COORDINATION
6/4/2024  2:16 PM      ICD-10-CM    1. Acute appendicitis with generalized peritonitis without gangrene, perforation, or abscess  K35.200       2. Acute appendicitis, unspecified acute appendicitis type  K35.80 HYDROcodone-acetaminophen (NORCO) 5-325 MG per tablet            General Risk Score: 2   Values used to calculate this score:    Points  Metrics       1        Age: 73       1        Hospital Admissions: 1       0        ED Visits: 0       0        Has Chronic Obstructive Pulmonary Disease: No       0        Has Diabetes: No       0        Has Congestive Heart Failure: No       0        Has Liver Disease: No       0        Has Depression: No       0        Current PCP: Evy Mejia MD       0        Has Medicaid: No      Patient status OBS, surgery planned for today (laparoscopic appendectomy).    CM reviewed EMR. No CM needs indicated at this time. Providers, if needs arise, please consult case management.     Teri Schaefer RN, [facility]  [facility]

## 2024-06-04 NOTE — PLAN OF CARE
Problem: Discharge Planning  Goal: Discharge to home or other facility with appropriate resources  6/4/2024 1111 by Rachel Flores RN  Outcome: Progressing  6/4/2024 0219 by Michelle Emery RN  Outcome: Progressing     Problem: Safety - Adult  Goal: Free from fall injury  6/4/2024 1111 by Rachel Flores RN  Outcome: Progressing  6/4/2024 0219 by Michelle Emery, RN  Outcome: Progressing     Problem: Pain  Goal: Verbalizes/displays adequate comfort level or baseline comfort level  6/4/2024 1111 by Rachel Flores RN  Outcome: Progressing  6/4/2024 0219 by Michelle Emery, RN  Outcome: Progressing

## 2024-06-04 NOTE — ANESTHESIA PRE PROCEDURE
Department of Anesthesiology  Preprocedure Note       Name:  Shantel Mitchell   Age:  73 y.o.  :  1950                                          MRN:  [id number]         Date:  2024      Surgeon: Surgeon(s):  Daniel Rae MD    Procedure: Procedure(s):  APPENDECTOMY LAPAROSCOPIC    Medications prior to admission:   Prior to Admission medications    Medication Sig Start Date End Date Taking? Authorizing Provider   HYDROcodone-acetaminophen (NORCO) 5-325 MG per tablet Take 1 tablet by mouth every 6 hours as needed for Pain for up to 3 days. Intended supply: 3 days. Take lowest dose possible to manage pain Max Daily Amount: 4 tablets 24 Yes Daniel Rae MD   ondansetron (ZOFRAN-ODT) 4 MG disintegrating tablet Take 1 tablet by mouth 3 times daily as needed for Nausea or Vomiting 24  Yes Daniel Rae MD   bisoprolol-hydroCHLOROthiazide (ZIAC) 5-6.25 MG per tablet Take 1 tablet by mouth daily Please be sure to schedule your follow-up due in 24   Evy Mejia MD   fenofibrate micronized (LOFIBRA) 134 MG capsule Take 1 capsule by mouth every morning Please be sure to schedule your follow-up which is due in March  Patient taking differently: Take 1 capsule by mouth nightly Please be sure to schedule your follow-up which is due in 24   Evy Mejia MD   atorvastatin (LIPITOR) 20 MG tablet Take 1 tablet by mouth every other day 23   Evy Mejia MD   magnesium citrate solution Take 296 mLs by mouth once    Provider, MD Vicente   D-MANNOSE PO Take by mouth    Automatic Reconciliation, Ar   ZINC PO Take by mouth    Automatic Reconciliation, Ar   vitamin D 25 MCG (1000 UT) CAPS Take 2 capsules by mouth daily    Automatic Reconciliation, Ar   estrogens conjugated (PREMARIN) 0.625 MG/GM CREA vaginal cream Apply 0.5 g topically daily Three times per week. Monday, Wednesday and 17   Automatic Reconciliation, Ar   melatonin 3 MG TABS

## 2024-06-04 NOTE — ED NOTES
TRANSFER - OUT REPORT:    Verbal report given to Michelle Emery on Shantel Mitchell  being transferred to [facility] for routine progression of patient care       Report consisted of patient's Situation, Background, Assessment and   Recommendations(SBAR).     Information from the following report(s) Nurse Handoff Report, ED Encounter Summary, ED SBAR, and MAR was reviewed with the receiving nurse.    [place] Fall Assessment:    Presents to emergency department  because of falls (Syncope, seizure, or loss of consciousness): No  Age > 70: No  Altered Mental Status, Intoxication with alcohol or substance confusion (Disorientation, impaired judgment, poor safety awaremess, or inability to follow instructions): No  Impaired Mobility: Ambulates or transfers with assistive devices or assistance; Unable to ambulate or transer.: No  Nursing Judgement: No          Lines:   Peripheral IV 06/03/24 Left;Posterior Forearm (Active)        Opportunity for questions and clarification was provided.      Patient transported with:  [facility]

## 2024-06-04 NOTE — ED TRIAGE NOTES
Pt ambulatory in ED with c/o right lower abdominal pain that started this morning. Pt denies any NVD and no UA symptoms. Pt said the pain is a dull soreness that is constant and is now gone since 1900 when she took 2 tylenols. Pt said she has been under increased stress lately.

## 2024-06-04 NOTE — ED PROVIDER NOTES
[facility] B4 MULTI-SPECIALTY ORTHOPEDICS 2  EMERGENCY DEPARTMENT ENCOUNTER      Pt Name: Shantel Mitchell  MRN: [id number]  Birthdate 1950  Date of evaluation: 6/3/2024  Provider: Car De La Rosa MD    CHIEF COMPLAINT     No chief complaint on file.        HISTORY OF PRESENT ILLNESS   73-year-old female with history significant for HTN and leukemia in remission presents to the ED with chief complaint of right lower quadrant abdominal pain starting this morning.  Patient says that symptoms have waxed and waned, currently minimal.  No nausea, vomiting, fevers, chills, urinary symptoms, or bowel symptoms.  No history of similar pain.  Patient took 2 Tylenol at 7 PM with significant improvement.  She reports history of cholecystectomy, denies other abdominal surgeries.    The history is provided by the patient.       Review of External Medical Records:     Nursing Notes were reviewed.    REVIEW OF SYSTEMS       Review of Systems   Respiratory:  Negative for shortness of breath.    Cardiovascular:  Negative for chest pain.       Except as noted above the remainder of the review of systems was reviewed and negative.       PAST MEDICAL HISTORY     Past Medical History:   Diagnosis Date    Hypertension     Leukemia (HCC) 1985         SURGICAL HISTORY       Past Surgical History:   Procedure Laterality Date    BONE MARROW TRANSPLANT      first patient to receive, [facility]    BUNIONECTOMY      CARPAL TUNNEL RELEASE Bilateral     1990, 1996    CHOLECYSTECTOMY      COLONOSCOPY N/A 11/4/2022    COLONOSCOPY performed by Sav Juarez MD at [facility] ENDOSCOPY    ORTHOPEDIC SURGERY      ankle.         CURRENT MEDICATIONS       Current Discharge Medication List        CONTINUE these medications which have NOT CHANGED    Details   bisoprolol-hydroCHLOROthiazide (ZIAC) 5-6.25 MG per tablet Take 1 tablet by mouth daily Please be sure to schedule your follow-up due in March  Qty: 90 tablet, Refills: 0    Associated

## 2024-06-04 NOTE — CONSULTS
Determinants of Health     Food Insecurity: No Food Insecurity (6/4/2024)    Hunger Vital Sign     Worried About Running Out of Food in the Last Year: Never true     Ran Out of Food in the Last Year: Never true   Transportation Needs: No Transportation Needs (6/4/2024)    PRAPARE - Transportation     Lack of Transportation (Medical): No     Lack of Transportation (Non-Medical): No   Housing Stability: Low Risk  (6/4/2024)    Housing Stability Vital Sign     Unable to Pay for Housing in the Last Year: No     Number of Places Lived in the Last Year: 1     Unstable Housing in the Last Year: No      Current Facility-Administered Medications   Medication Dose Route Frequency    piperacillin-tazobactam (ZOSYN) 3,375 mg in sodium chloride 0.9 % 50 mL IVPB (mini-bag)  3,375 mg IntraVENous Q8H    lactated ringers IV soln infusion   IntraVENous Continuous    morphine (PF) injection 2 mg  2 mg IntraVENous Q4H PRN    ketorolac (TORADOL) injection 15 mg  15 mg IntraVENous Q6H PRN    acetaminophen (TYLENOL) tablet 650 mg  650 mg Oral Q4H PRN    ondansetron (ZOFRAN) injection 4 mg  4 mg IntraVENous Q6H PRN      Allergies   Allergen Reactions    Codeine Nausea And Vomiting    Erythromycin Nausea And Vomiting    Nitrofurantoin Diarrhea and Nausea And Vomiting       Review of Systems:     []     Unable to obtain  ROS due to  []    mental status change  []    sedated   []    intubated   [x]    Total of 12 system negative, unless specified below or in HPI:  Constitutional: negative fever, negative chills, negative weight loss  Eyes:   negative visual changes  ENT:   negative sore throat, tongue or lip swelling  Respiratory:  negative cough, negative dyspnea  Cards:  negative for chest pain, palpitations, lower extremity edema  GI:   See HPI  :  negative for frequency, dysuria  Integument:  negative for rash and pruritus  Heme:  negative for easy bruising and gum/nose bleeding  Musculoskel: negative for myalgias,  back pain and muscle

## 2024-06-04 NOTE — BRIEF OP NOTE
Brief Postoperative Note      Patient: Shantel Mitchell  YOB: 1950  MRN: [id number]    Date of Procedure: 6/4/2024    Pre-Op Diagnosis Codes:     * Appendicitis, unspecified appendicitis type [K37]    Post-Op Diagnosis: Same       Procedure(s):  APPENDECTOMY LAPAROSCOPIC    Surgeon(s):  Daniel Rae MD    Assistant:  Surgical Assistant: Jasper Toth    Anesthesia: General    Estimated Blood Loss (mL): Minimal    Complications: None    Specimens:   ID Type Source Tests Collected by Time Destination   1 : appendix Tissue Appendix SURGICAL PATHOLOGY Daniel Rae MD 6/4/2024 1248        Implants:  * No implants in log *      Drains: * No LDAs found *    Findings:  Infection Present At Time Of Surgery (PATOS) (choose all levels that have infection present):  No infection present  Other Findings: Gangrenous non-perforated appendix    Electronically signed by Daniel Rae MD on 6/4/2024 at 3:14 PM

## 2024-06-04 NOTE — ANESTHESIA POSTPROCEDURE EVALUATION
Department of Anesthesiology  Postprocedure Note    Patient: Shantel Mitchell  MRN: [id number]  YOB: 1950  Date of evaluation: 6/4/2024    Procedure Summary       Date: 06/04/24 Room / [facility]: [facility] MAIN OR  / [facility] MAIN OR    Anesthesia Start: 1206 Anesthesia Stop: 1325    Procedure: APPENDECTOMY LAPAROSCOPIC (Abdomen) Diagnosis:       Appendicitis, unspecified appendicitis type      (Appendicitis, unspecified appendicitis type [K37])    Surgeons: Daniel Rae MD Responsible Provider: Terrence Hawkins MD    Anesthesia Type: General ASA Status: 2            Anesthesia Type: General    Brandon Phase I: Brandon Score: 10    Brandon Phase II:      Anesthesia Post Evaluation    Patient location during evaluation: PACU  Patient participation: complete - patient participated  Level of consciousness: awake  Airway patency: patent  Nausea & Vomiting: no vomiting and no nausea  Cardiovascular status: hemodynamically stable  Respiratory status: acceptable  Hydration status: stable  Pain management: adequate    No notable events documented.

## 2024-06-04 NOTE — DISCHARGE INSTRUCTIONS
Appendectomy: What to Expect at Home  Your Recovery     Your doctor removed your appendix either by making many small cuts, called incisions, in your belly (laparoscopic surgery) or through open surgery. In open surgery, the doctor makes one large incision. The incisions leave scars that usually fade over time.  After your surgery, it is normal to feel weak and tired for several days after you return home. Your belly may be swollen and may be painful. If you had laparoscopic surgery, you may have shoulder pain. This is caused by the air the doctor put in your belly to help see the organs better. The pain may last for a day or two.  You may also have nausea or vomiting, diarrhea, constipation, gas, or a headache. These problems usually go away in a few days.  Your recovery time depends on the type of surgery you had. If you had laparoscopic surgery, you will probably be able to return to work or a normal routine in a couple of weeks after surgery. If you had an open surgery, it may take longer. If your appendix ruptured, you may have a drain in your incision.  Your body will work fine without an appendix. You won't have to make any changes in your diet or lifestyle.  This care sheet gives you a general idea about how long it will take for you to recover. But each person recovers at a different pace. Follow the steps below to get better as quickly as possible.  How can you care for yourself at home?  Activity    Rest when you feel tired. Getting enough sleep will help you recover.     Try to walk each day. Start by walking a little more than you did the day before. Bit by bit, increase the amount you walk. Walking boosts blood flow and helps prevent pneumonia and constipation.     For about 2 weeks, avoid lifting anything that would make you strain. This may include a child, heavy grocery bags and milk containers, a heavy briefcase or backpack, cat litter or dog food bags, or a vacuum .     Avoid strenuous

## 2024-06-05 VITALS
RESPIRATION RATE: 16 BRPM | SYSTOLIC BLOOD PRESSURE: 117 MMHG | TEMPERATURE: 98.6 F | HEIGHT: 63 IN | OXYGEN SATURATION: 94 % | WEIGHT: 190 LBS | DIASTOLIC BLOOD PRESSURE: 63 MMHG | HEART RATE: 70 BPM | BODY MASS INDEX: 33.66 KG/M2

## 2024-06-05 PROBLEM — K35.80 ACUTE APPENDICITIS: Status: RESOLVED | Noted: 2024-06-03 | Resolved: 2024-06-05

## 2024-06-05 PROCEDURE — 2580000003 HC RX 258: Performed by: SURGERY

## 2024-06-05 PROCEDURE — 96375 TX/PRO/DX INJ NEW DRUG ADDON: CPT

## 2024-06-05 PROCEDURE — 6360000002 HC RX W HCPCS: Performed by: SURGERY

## 2024-06-05 PROCEDURE — 2580000003 HC RX 258: Performed by: STUDENT IN AN ORGANIZED HEALTH CARE EDUCATION/TRAINING PROGRAM

## 2024-06-05 PROCEDURE — G0378 HOSPITAL OBSERVATION PER HR: HCPCS

## 2024-06-05 PROCEDURE — 96365 THER/PROPH/DIAG IV INF INIT: CPT

## 2024-06-05 PROCEDURE — 6360000002 HC RX W HCPCS: Performed by: STUDENT IN AN ORGANIZED HEALTH CARE EDUCATION/TRAINING PROGRAM

## 2024-06-05 PROCEDURE — 96366 THER/PROPH/DIAG IV INF ADDON: CPT

## 2024-06-05 RX ADMIN — PIPERACILLIN AND TAZOBACTAM 3375 MG: 3; .375 INJECTION, POWDER, LYOPHILIZED, FOR SOLUTION INTRAVENOUS at 05:11

## 2024-06-05 RX ADMIN — SODIUM CHLORIDE, POTASSIUM CHLORIDE, SODIUM LACTATE AND CALCIUM CHLORIDE: 600; 310; 30; 20 INJECTION, SOLUTION INTRAVENOUS at 04:36

## 2024-06-05 RX ADMIN — KETOROLAC TROMETHAMINE 15 MG: 30 INJECTION, SOLUTION INTRAMUSCULAR at 04:43

## 2024-06-05 RX ADMIN — PIPERACILLIN AND TAZOBACTAM 3375 MG: 3; .375 INJECTION, POWDER, LYOPHILIZED, FOR SOLUTION INTRAVENOUS at 12:24

## 2024-06-05 ASSESSMENT — PAIN DESCRIPTION - ORIENTATION: ORIENTATION: RIGHT;LEFT;MID

## 2024-06-05 ASSESSMENT — PAIN DESCRIPTION - LOCATION: LOCATION: ABDOMEN;SHOULDER

## 2024-06-05 ASSESSMENT — PAIN DESCRIPTION - DESCRIPTORS: DESCRIPTORS: ACHING

## 2024-06-05 ASSESSMENT — PAIN SCALES - GENERAL: PAINLEVEL_OUTOF10: 7

## 2024-06-05 NOTE — PLAN OF CARE
Problem: Safety - Adult  Goal: Free from fall injury  6/4/2024 2357 by Rajni Lund, RN  Outcome: Progressing  6/4/2024 1111 by Rachel Flores, RN  Outcome: Progressing

## 2024-06-05 NOTE — CARE COORDINATION
Care Management Progress Note        Reason for Admission:   Acute appendicitis [K35.80]  Acute appendicitis with generalized peritonitis without gangrene, perforation, or abscess [K35.200]  Procedure(s) (LRB):  APPENDECTOMY LAPAROSCOPIC (N/A)  1 Day Post-Op      Patient Admission Status: Observation  RUR: No data recorded      Hospitalization in the last 30 days (Readmission):  No        Transition of care plan:  Per IDR, patient is anticipated to discharge home today. Patient is on a regular diet.    Dispo: home. No CM needs anticipated, please consult CM if needs arise.    Outpatient follow-up.    Pt's family to transport.            ___________________________________________   Svetlana Billy RN Case Manager  6/5/2024   10:19 AM

## 2024-06-05 NOTE — DISCHARGE SUMMARY
Discharge Summary    Patient: Shantel Mitchell               Sex: female          DOA: 6/3/2024  8:58 PM       YOB: 1950      Age:  73 y.o.        LOS:  LOS: 0 days                Discharge Date:      Admission Diagnoses: Acute appendicitis [K35.80]  Acute appendicitis with generalized peritonitis without gangrene, perforation, or abscess [K35.200]    Discharge Diagnoses:  Same    Procedure:  Procedure(s):  APPENDECTOMY LAPAROSCOPIC    Discharge Condition: Good    Hospital Course: Unremarkable operative procedure. She is tolerating a regular diet, passing gas, and pain is well controlled. She walked up and down the hallway today. She has no new complaints at this time. She feels ready to go home and says her family member will pick her up. We discussed the pathophysiology of appendicitis in great detail. Discharge to home in stable condition.      Consults: None    Significant Diagnostic Studies: See full electronic record.     Discharge Medications:     Current Discharge Medication List        START taking these medications    Details   HYDROcodone-acetaminophen (NORCO) 5-325 MG per tablet Take 1 tablet by mouth every 6 hours as needed for Pain for up to 3 days. Intended supply: 3 days. Take lowest dose possible to manage pain Max Daily Amount: 4 tablets  Qty: 10 tablet, Refills: 0    Comments: Reduce doses taken as pain becomes manageable  Associated Diagnoses: Acute appendicitis, unspecified acute appendicitis type      ondansetron (ZOFRAN-ODT) 4 MG disintegrating tablet Take 1 tablet by mouth 3 times daily as needed for Nausea or Vomiting  Qty: 14 tablet, Refills: 0           CONTINUE these medications which have NOT CHANGED    Details   bisoprolol-hydroCHLOROthiazide (ZIAC) 5-6.25 MG per tablet Take 1 tablet by mouth daily Please be sure to schedule your follow-up due in March  Qty: 90 tablet, Refills: 0    Associated Diagnoses: Essential hypertension      fenofibrate micronized

## 2024-08-07 ENCOUNTER — HOSPITAL ENCOUNTER (EMERGENCY)
Facility: HOSPITAL | Age: 74
Discharge: HOME | End: 2024-08-07
Attending: EMERGENCY MEDICINE
Payer: MEDICARE

## 2024-08-07 VITALS
HEIGHT: 64 IN | TEMPERATURE: 98.6 F | OXYGEN SATURATION: 95 % | BODY MASS INDEX: 32.78 KG/M2 | DIASTOLIC BLOOD PRESSURE: 82 MMHG | SYSTOLIC BLOOD PRESSURE: 162 MMHG | HEART RATE: 76 BPM | WEIGHT: 192 LBS | RESPIRATION RATE: 16 BRPM

## 2024-08-07 DIAGNOSIS — R11.0 NAUSEA: ICD-10-CM

## 2024-08-07 DIAGNOSIS — E87.1 HYPONATREMIA: ICD-10-CM

## 2024-08-07 DIAGNOSIS — R19.7 DIARRHEA, UNSPECIFIED TYPE: Primary | ICD-10-CM

## 2024-08-07 LAB
ALBUMIN SERPL-MCNC: 4.6 G/DL (ref 3.5–5.2)
ALBUMIN/GLOB SERPL: 1.4 (ref 1.1–2.2)
ALP SERPL-CCNC: 75 U/L (ref 35–104)
ALT SERPL-CCNC: 21 U/L (ref 10–35)
ANION GAP SERPL CALC-SCNC: 14 MMOL/L (ref 5–15)
APPEARANCE UR: CLEAR
AST SERPL-CCNC: 28 U/L (ref 10–35)
BACTERIA URNS QL MICRO: NEGATIVE /HPF
BASOPHILS # BLD: 0 K/UL (ref 0–1)
BASOPHILS NFR BLD: 0 % (ref 0–1)
BILIRUB SERPL-MCNC: 0.6 MG/DL (ref 0.2–1)
BILIRUB UR QL: NEGATIVE
BUN SERPL-MCNC: 10 MG/DL (ref 8–23)
BUN/CREAT SERPL: 15 (ref 12–20)
CALCIUM SERPL-MCNC: 10.7 MG/DL (ref 8.8–10.2)
CHLORIDE SERPL-SCNC: 94 MMOL/L (ref 98–107)
CO2 SERPL-SCNC: 21 MMOL/L (ref 22–29)
COLOR UR: ABNORMAL
CREAT SERPL-MCNC: 0.66 MG/DL (ref 0.5–0.9)
DIFFERENTIAL METHOD BLD: ABNORMAL
EOSINOPHIL # BLD: 0.1 K/UL (ref 0–0.4)
EOSINOPHIL NFR BLD: 0 %
EPITH CASTS URNS QL MICRO: ABNORMAL /LPF
ERYTHROCYTE [DISTWIDTH] IN BLOOD BY AUTOMATED COUNT: 14.1 % (ref 11.5–14.5)
FLUAV RNA SPEC QL NAA+PROBE: NOT DETECTED
FLUBV RNA SPEC QL NAA+PROBE: NOT DETECTED
GLOBULIN SER CALC-MCNC: 3.3 G/DL (ref 2–4)
GLUCOSE SERPL-MCNC: 133 MG/DL (ref 65–100)
GLUCOSE UR STRIP.AUTO-MCNC: NEGATIVE MG/DL
HCT VFR BLD AUTO: 43.2 % (ref 35–47)
HGB BLD-MCNC: 14.3 G/DL (ref 11.5–16)
HGB UR QL STRIP: ABNORMAL
IMM GRANULOCYTES # BLD AUTO: 0.1 K/UL (ref 0–0.04)
IMM GRANULOCYTES NFR BLD AUTO: 0 % (ref 0–0.5)
KETONES UR QL STRIP.AUTO: NEGATIVE MG/DL
LEUKOCYTE ESTERASE UR QL STRIP.AUTO: NEGATIVE
LYMPHOCYTES # BLD: 3.2 K/UL (ref 0.8–3.5)
LYMPHOCYTES NFR BLD: 25 % (ref 12–49)
MAGNESIUM SERPL-MCNC: 1.7 MG/DL (ref 1.6–2.4)
MCH RBC QN AUTO: 29.1 PG (ref 26–34)
MCHC RBC AUTO-ENTMCNC: 33.1 G/DL (ref 30–36.5)
MCV RBC AUTO: 87.8 FL (ref 80–99)
MONOCYTES # BLD: 0.7 K/UL (ref 0–1)
MONOCYTES NFR BLD: 5 % (ref 5–13)
NEUTS SEG # BLD: 8.9 K/UL (ref 1.8–8)
NEUTS SEG NFR BLD: 70 % (ref 32–75)
NITRITE UR QL STRIP.AUTO: NEGATIVE
NRBC # BLD: 0 K/UL (ref 0–0.01)
NRBC BLD-RTO: 0 PER 100 WBC
PH UR STRIP: 6 (ref 5–8)
PLATELET # BLD AUTO: 328 K/UL (ref 150–400)
PMV BLD AUTO: 8.7 FL (ref 8.9–12.9)
POTASSIUM SERPL-SCNC: 4.4 MMOL/L (ref 3.5–5.1)
PROT SERPL-MCNC: 7.9 G/DL (ref 6.4–8.3)
PROT UR STRIP-MCNC: NEGATIVE MG/DL
RBC # BLD AUTO: 4.92 M/UL (ref 3.8–5.2)
RBC #/AREA URNS HPF: ABNORMAL /HPF
SARS-COV-2 RNA RESP QL NAA+PROBE: NOT DETECTED
SODIUM SERPL-SCNC: 129 MMOL/L (ref 136–145)
SP GR UR REFRACTOMETRY: 1.01 (ref 1–1.03)
UROBILINOGEN UR QL STRIP.AUTO: 0.2 EU/DL (ref 0.2–1)
WBC # BLD AUTO: 12.8 K/UL (ref 3.6–11)
WBC URNS QL MICRO: ABNORMAL /HPF (ref 0–4)

## 2024-08-07 PROCEDURE — 96374 THER/PROPH/DIAG INJ IV PUSH: CPT

## 2024-08-07 PROCEDURE — 96361 HYDRATE IV INFUSION ADD-ON: CPT

## 2024-08-07 PROCEDURE — 87506 IADNA-DNA/RNA PROBE TQ 6-11: CPT

## 2024-08-07 PROCEDURE — 83735 ASSAY OF MAGNESIUM: CPT

## 2024-08-07 PROCEDURE — 6360000002 HC RX W HCPCS: Performed by: EMERGENCY MEDICINE

## 2024-08-07 PROCEDURE — 85025 COMPLETE CBC W/AUTO DIFF WBC: CPT

## 2024-08-07 PROCEDURE — 2580000003 HC RX 258: Performed by: EMERGENCY MEDICINE

## 2024-08-07 PROCEDURE — 80053 COMPREHEN METABOLIC PANEL: CPT

## 2024-08-07 PROCEDURE — 87636 SARSCOV2 & INF A&B AMP PRB: CPT

## 2024-08-07 PROCEDURE — 99284 EMERGENCY DEPT VISIT MOD MDM: CPT

## 2024-08-07 PROCEDURE — 81001 URINALYSIS AUTO W/SCOPE: CPT

## 2024-08-07 RX ORDER — 0.9 % SODIUM CHLORIDE 0.9 %
1000 INTRAVENOUS SOLUTION INTRAVENOUS ONCE
Status: COMPLETED | OUTPATIENT
Start: 2024-08-07 | End: 2024-08-07

## 2024-08-07 RX ORDER — ONDANSETRON 4 MG/1
4 TABLET, ORALLY DISINTEGRATING ORAL EVERY 8 HOURS PRN
Qty: 30 TABLET | Refills: 0 | Status: SHIPPED | OUTPATIENT
Start: 2024-08-07

## 2024-08-07 RX ORDER — ONDANSETRON 2 MG/ML
4 INJECTION INTRAMUSCULAR; INTRAVENOUS
Status: COMPLETED | OUTPATIENT
Start: 2024-08-07 | End: 2024-08-07

## 2024-08-07 RX ADMIN — ONDANSETRON 4 MG: 2 INJECTION INTRAMUSCULAR; INTRAVENOUS at 02:49

## 2024-08-07 RX ADMIN — SODIUM CHLORIDE 1000 ML: 9 INJECTION, SOLUTION INTRAVENOUS at 03:17

## 2024-08-07 RX ADMIN — SODIUM CHLORIDE 1000 ML: 9 INJECTION, SOLUTION INTRAVENOUS at 01:10

## 2024-08-07 ASSESSMENT — PAIN - FUNCTIONAL ASSESSMENT: PAIN_FUNCTIONAL_ASSESSMENT: NONE - DENIES PAIN

## 2024-08-07 NOTE — ED PROVIDER NOTES
[facility] EMERGENCY DEPT  EMERGENCY DEPARTMENT ENCOUNTER      Pt Name: Shantel Mitchell  MRN: [id number]  Birthdate 1950  Date of evaluation: 8/7/2024  Provider: Gorge Bojorquez MD    CHIEF COMPLAINT       Chief Complaint   Patient presents with    Diarrhea       EMERGENCY DEPARTMENT COURSE and DIFFERENTIAL DIAGNOSIS/MDM:   Medical Decision Making  73-year-old female brought into the ER by EMS with report of diarrhea for the last 6 days.  Patient reports having watery stools with no blood or mucus.  Denies any fevers or chills or abdominal pain.  Patient has been taking Imodium for her symptoms and reports that it seems to be slowing down episodes of bowel movements.  Denies any recent antibiotics and denies any specific sick contacts however reports that other people have reported some similar symptoms recently.  Patient denies any urinary symptoms pain with urination or frequency.  No URI-like symptoms.  Patient was concerned because she has had low sodium levels in the past.  Patient has been doing the brat diet and trying to continue to drink and hydrate at home with Gatorade electrolyte and water.  On arrival patient is well-appearing no acute distress afebrile with no tachycardia.  Lungs are clear moist mucous membranes.  No abdominal pain or tenderness no hyperactive bowel sounds.  No bloating or distention no CVA tenderness.  In light of episodes of diarrhea ordered labs and electrolytes check sodium level.  Will give IV fluids.  Urinalysis no sign of infection.  Normal kidney function however patient sodium is slightly low likely secondary to GI losses.  Patient had bowel movement in ER which was semisolid.  Will send for stool studies.  mild leukocytosis.  Patient does have history of cholecystectomy and appendectomy however no abdominal pain on exam.  At this time no need for imaging.  Patient received 2 L IV fluids, COVID flu is negative.  Patient has not had any further episodes of diarrhea  Coma Scale  Eye Opening: Spontaneous  Best Verbal Response: Oriented  Best Motor Response: Obeys commands  Zulma Coma Scale Score: 15                     CIWA Assessment  BP: (!) 162/82  Pulse: 76                 PHYSICAL EXAM       Vitals:    08/07/24 0047   BP: (!) 162/82   Pulse: 76   Resp: 16   Temp: 98.6 °F (37 °C)   SpO2: 95%   Weight: 87.1 kg (192 lb)   Height: 1.613 m (5' 3.5\")       Body mass index is 33.48 kg/m².    Physical Exam  Vitals and nursing note reviewed.   Constitutional:       General: She is not in acute distress.     Appearance: Normal appearance.   HENT:      Head: Normocephalic.      Mouth/Throat:      Pharynx: Oropharynx is clear.   Eyes:      Conjunctiva/sclera: Conjunctivae normal.   Cardiovascular:      Rate and Rhythm: Normal rate.   Pulmonary:      Effort: Pulmonary effort is normal. No respiratory distress.   Abdominal:      General: Abdomen is flat. There is no distension.      Palpations: Abdomen is soft.      Tenderness: There is no abdominal tenderness. There is no guarding or rebound.   Musculoskeletal:         General: Normal range of motion.      Cervical back: Neck supple.   Skin:     General: Skin is warm.      Capillary Refill: Capillary refill takes less than 2 seconds.      Findings: No rash.   Neurological:      General: No focal deficit present.      Mental Status: She is alert and oriented to person, place, and time.         DIAGNOSTIC RESULTS     RADIOLOGY:   Interpretation per the Radiologist below, if available at the time of this note:    No orders to display        LABS:    Results for orders placed or performed during the hospital encounter of 08/07/24   COVID-19 & Influenza Combo    Specimen: Nasopharyngeal   Result Value Ref Range    SARS-CoV-2, PCR Not detected NOTD      Rapid Influenza A By PCR Not detected NOTD      Rapid Influenza B By PCR Not detected NOTD     CBC with Auto Differential   Result Value Ref Range    WBC 12.8 (H) 3.6 - 11.0 K/uL    RBC 4.92

## 2024-08-07 NOTE — ED TRIAGE NOTES
Pt arrives via EMS for c/o diarrhea x 6 days. Pt took imodium PTA.  Denies fever.  Pt reports she is concerned with sodium level.

## 2024-08-08 ENCOUNTER — TELEPHONE (OUTPATIENT)
Age: 74
End: 2024-08-08

## 2024-08-08 LAB
C COLI+JEJUNI TUF STL QL NAA+PROBE: NEGATIVE
EC STX1+STX2 GENES STL QL NAA+PROBE: NEGATIVE
ETEC ELTA+ESTB GENES STL QL NAA+PROBE: NEGATIVE
P SHIGELLOIDES DNA STL QL NAA+PROBE: NEGATIVE
SALMONELLA SP SPAO STL QL NAA+PROBE: NEGATIVE
SHIGELLA SP+EIEC IPAH STL QL NAA+PROBE: NEGATIVE
V CHOL+PARA+VUL DNA STL QL NAA+NON-PROBE: NEGATIVE
Y ENTEROCOL DNA STL QL NAA+NON-PROBE: NEGATIVE

## 2024-08-08 NOTE — TELEPHONE ENCOUNTER
----- Message from Puja Goetz sent at 8/8/2024 10:02 AM EDT -----  Regarding: E[facility] Appointment Request  E[facility] Appointment Request    Patient needs appointment for E[facility] Appointment Type: New Patient.    Patient Requested Dates(s): as soon as possible  Patient Requested Time: anytime   Provider Name: Khushbu Knapp, TEJ    Reason for Appointment Request: New Patient - Available appointments did not meet patient need. Pt been to the emergency health and she need to bee seen for follow up with you due to diarrhea.since the 1st visit is still on  9/23/2024   at  3:30 pm .  --------------------------------------------------------------------------------------------------------------------------    Relationship to Patient: Self     Call Back Information: OK to leave message on voicemail  Preferred Call Back Number: Phone [phone]

## 2024-09-03 ENCOUNTER — TELEPHONE (OUTPATIENT)
Facility: CLINIC | Age: 74
End: 2024-09-03

## 2024-09-03 NOTE — TELEPHONE ENCOUNTER
----- Message from Germaine LOTT sent at 9/3/2024  3:14 PM EDT -----  Regarding: E[facility] Appointment Request  E[facility] Appointment Request    Patient needs appointment for ECC Appointment Type: New to Provider.    Patient Requested Dates(s):As soon as possible  Patient Requested Time: PT prefers afternoon  Provider Name:Germaine Hurt APRN - CNP      Reason for Appointment Request: New Patient - Available appointments did not meet patient need  --------------------------------------------------------------------------------------------------------------------------    Relationship to Patient: Self     Call Back Information: OK to leave message on voicemail  Preferred Call Back Number: Phone [phone]

## 2024-09-04 ENCOUNTER — TELEPHONE (OUTPATIENT)
Age: 74
End: 2024-09-04

## 2024-09-04 NOTE — TELEPHONE ENCOUNTER
----- Message from Surinder MCDONALD sent at 9/4/2024 11:39 AM EDT -----  Regarding: E[facility] Appointment Request  E[facility] Appointment Request    Patient needs appointment for ECC Appointment Type: New to Provider.    Patient Requested Dates(s): as soon as possible  Patient Requested Time: morning  Provider Name: preferred PCP Lissa Hardy / Kacy Segura    Reason for Appointment Request: Established Patient - No appointments available during search  --------------------------------------------------------------------------------------------------------------------------    Relationship to Patient: Covered Entity     Call Back Information: OK to leave message on voicemail  Preferred Call Back Number: Phone [phone]  phone number /  [phone] [facility] phone number

## 2024-09-04 NOTE — TELEPHONE ENCOUNTER
Pt requesting a\"new patient\" appointment, however, pt has a\"new pt\"  appointment with another practice on 10/15    Message routed to  for scheduling    Nhung Linares LPN

## 2024-10-21 DIAGNOSIS — I10 ESSENTIAL HYPERTENSION: ICD-10-CM

## 2024-10-21 RX ORDER — BISOPROLOL FUMARATE AND HYDROCHLOROTHIAZIDE 5; 6.25 MG/1; MG/1
1 TABLET ORAL DAILY
Qty: 90 TABLET | Refills: 0 | OUTPATIENT
Start: 2024-10-21

## 2024-10-21 NOTE — TELEPHONE ENCOUNTER
Please call.  Patient is overdue for follow-up.  Follow-up must be scheduled for short term refill.  Next routine available virtual or in office, any available provider.

## 2024-10-23 ENCOUNTER — TELEPHONE (OUTPATIENT)
Age: 74
End: 2024-10-23

## 2024-10-23 NOTE — TELEPHONE ENCOUNTER
She needs to be seen since her last visit was over a year ago.  Please offer next routine available at this office with any provider.  I can send a short term refill until then.    Otherwise she should be evaluated at urgent care for refills.  If it is Ludivina Alicea MD at [facility], I do not see an appointment in the system.

## 2024-10-23 NOTE — TELEPHONE ENCOUNTER
Pt is having several insurance issues and was irritated with the miscommunication she was having with insurance company because she was told Dr. Mejia was leaving [facility] so she has an appt with new provider Ludivina Alicea at another practice in January but she needs just enough refills on her prescriptions until her new appointments in January with the new provider.  Pt was requesting a callback so she could clarify insurance situation at [phone] Thank You

## 2024-10-24 NOTE — TELEPHONE ENCOUNTER
Pt is refusing to make appt with Dr. Mejia because she has an appt with new provider due to being told by insurance company that Dr. Mejia was no longer with the practice.  I explained provided could not renew medication without an appointment. Pt got upset and hungup.

## (undated) DEVICE — BAG BELONG PT PERS CLEAR HANDL

## (undated) DEVICE — KIT COLON W/ 1.1OZ LUB AND 2 END

## (undated) DEVICE — SOLUTION IRRIG 500ML 0.9% SOD CHLO USP POUR PLAS BTL

## (undated) DEVICE — POLYP TRAP: Brand: TRAPEASE®

## (undated) DEVICE — CUFF RMFG BP INF SZ 11 DISP -- LAWSON OEM ITEM 238915

## (undated) DEVICE — ADULT SPO2 SENSOR,REMANUFACTURED,REPROCESSED DEVICE FOR SINGLE USE; REPROCESSED BY COVIDIEN LLC: Brand: NELLCOR

## (undated) DEVICE — TROCAR: Brand: KII® OPTICAL ACCESS SYSTEM

## (undated) DEVICE — 1200 GUARD II KIT W/5MM TUBE W/O VAC TUBE: Brand: GUARDIAN

## (undated) DEVICE — SEALER ONE-STEP 37CM LIGASURE MARYLAND XP

## (undated) DEVICE — RELOAD STPL L45MM H1.5-3.6MM REG TISS BLU GRIPPING SURF B

## (undated) DEVICE — CATH IV AUTOGRD BC BLU 22GA 25 -- INSYTE

## (undated) DEVICE — SUTURE VICRYL + SZ 0 L27IN ABSRB VLT L26MM UR-6 5/8 CIR VCP603H

## (undated) DEVICE — Device

## (undated) DEVICE — RELOAD STPL L45MM H2-4.1MM THCK TISS GRN GRIPPING SURF B

## (undated) DEVICE — LAPAROSCOPIC TROCAR SLEEVE/SINGLE USE: Brand: KII® OPTICAL ACCESS SYSTEM

## (undated) DEVICE — SNARE ENDOSCP M L240CM W27MM SHTH DIA2.4MM CHN 2.8MM OVL

## (undated) DEVICE — CANN NASAL O2 CAPNOGRAPHY AD -- FILTERLINE

## (undated) DEVICE — CANISTER, RIGID, 3000CC: Brand: MEDLINE INDUSTRIES, INC.

## (undated) DEVICE — LIQUIBAND RAPID ADHESIVE 36/CS 0.8ML: Brand: MEDLINE

## (undated) DEVICE — GENERAL LAPAROSCOPY-SFMC: Brand: MEDLINE INDUSTRIES, INC.

## (undated) DEVICE — SOLIDIFIER MEDC 1200ML -- CONVERT TO 356117

## (undated) DEVICE — CONTAINER SPEC 20 ML LID NEUT BUFF FORMALIN 10 % POLYPR STS

## (undated) DEVICE — TROCAR: Brand: KII® SLEEVE

## (undated) DEVICE — STAPLER ECHELON 3000 45MM STANDARD

## (undated) DEVICE — SIMPLICITY FLUFF UNDERPAD 23X36, MODERATE: Brand: SIMPLICITY

## (undated) DEVICE — SET GRAV CK VLV NEEDLESS ST 3 GANGED 4WAY STPCOCK HI FLO 10

## (undated) DEVICE — 3M™ CUROS™ DISINFECTING CAP FOR NEEDLELESS CONNECTORS 270/CARTON 20 CARTONS/CASE CFF1-270: Brand: CUROS™

## (undated) DEVICE — BAG SPEC BIOHZRD 10 X 10 IN --

## (undated) DEVICE — GLOVE ORTHO 8   MSG9480

## (undated) DEVICE — SUTURE MONOCRYL SZ 4-0 L27IN ABSRB UD L19MM PS-2 1/2 CIR PRIM Y426H

## (undated) DEVICE — SOLUTION IRRIG 1000ML STRL H2O USP PLAS POUR BTL

## (undated) DEVICE — ELECTRODE,RADIOTRANSLUCENT,FOAM,3PK: Brand: MEDLINE

## (undated) DEVICE — TISSUE RETRIEVAL SYSTEM: Brand: INZII RETRIEVAL SYSTEM